# Patient Record
Sex: FEMALE | Race: WHITE | NOT HISPANIC OR LATINO | Employment: OTHER | URBAN - METROPOLITAN AREA
[De-identification: names, ages, dates, MRNs, and addresses within clinical notes are randomized per-mention and may not be internally consistent; named-entity substitution may affect disease eponyms.]

---

## 2017-01-01 ENCOUNTER — OFFICE VISIT (OUTPATIENT)
Dept: URGENT CARE | Facility: CLINIC | Age: 82
End: 2017-01-01
Payer: COMMERCIAL

## 2017-01-01 ENCOUNTER — TELEPHONE (OUTPATIENT)
Dept: URGENT CARE | Facility: CLINIC | Age: 82
End: 2017-01-01

## 2017-01-01 VITALS
RESPIRATION RATE: 18 BRPM | DIASTOLIC BLOOD PRESSURE: 92 MMHG | HEIGHT: 59 IN | BODY MASS INDEX: 34.68 KG/M2 | TEMPERATURE: 97 F | OXYGEN SATURATION: 97 % | WEIGHT: 172 LBS | SYSTOLIC BLOOD PRESSURE: 169 MMHG | HEART RATE: 71 BPM

## 2017-01-01 VITALS
RESPIRATION RATE: 18 BRPM | OXYGEN SATURATION: 97 % | BODY MASS INDEX: 34.68 KG/M2 | TEMPERATURE: 97 F | HEART RATE: 64 BPM | HEIGHT: 59 IN | SYSTOLIC BLOOD PRESSURE: 101 MMHG | DIASTOLIC BLOOD PRESSURE: 78 MMHG | WEIGHT: 172 LBS

## 2017-01-01 DIAGNOSIS — S91.052A CAT BITE OF LEFT ANKLE, INITIAL ENCOUNTER: Primary | ICD-10-CM

## 2017-01-01 DIAGNOSIS — L08.9 CAT BITE OF LEFT LOWER LEG WITH INFECTION, INITIAL ENCOUNTER: ICD-10-CM

## 2017-01-01 DIAGNOSIS — S81.852A CAT BITE OF LEFT LOWER LEG WITH INFECTION, INITIAL ENCOUNTER: ICD-10-CM
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
## 2017-01-01 DIAGNOSIS — S91.052A CAT BITE OF ANKLE, LEFT, INITIAL ENCOUNTER: Primary | ICD-10-CM

## 2017-01-01 DIAGNOSIS — N76.1 SUBACUTE VAGINITIS: ICD-10-CM
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
## 2017-01-01 DIAGNOSIS — Z28.39 IMMUNIZATION DEFICIENCY: ICD-10-CM
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
## 2017-01-01 PROCEDURE — 99203 OFFICE O/P NEW LOW 30 MIN: CPT | Mod: S$GLB,,, | Performed by: EMERGENCY MEDICINE

## 2017-01-01 PROCEDURE — 99214 OFFICE O/P EST MOD 30 MIN: CPT | Mod: S$GLB,,, | Performed by: PHYSICIAN ASSISTANT

## 2017-01-01 RX ORDER — AMOXICILLIN AND CLAVULANATE POTASSIUM 875; 125 MG/1; MG/1
1 TABLET, FILM COATED ORAL 2 TIMES DAILY
Qty: 20 TABLET | Refills: 0 | Status: SHIPPED | OUTPATIENT
Start: 2017-01-01 | End: 2017-01-01

## 2017-01-01 RX ORDER — NAPROXEN SODIUM 220 MG
220 TABLET ORAL 2 TIMES DAILY WITH MEALS
COMMUNITY
End: 2018-01-01

## 2017-01-01 RX ORDER — FLUCONAZOLE 150 MG/1
150 TABLET ORAL ONCE
Qty: 1 TABLET | Refills: 0 | Status: SHIPPED | OUTPATIENT
Start: 2017-01-01 | End: 2017-01-01

## 2017-01-01 RX ORDER — AMOXICILLIN AND CLAVULANATE POTASSIUM 500; 125 MG/1; MG/1
1 TABLET, FILM COATED ORAL 2 TIMES DAILY
Qty: 20 TABLET | Refills: 0 | Status: SHIPPED | OUTPATIENT
Start: 2017-01-01 | End: 2017-01-01

## 2017-01-01 RX ORDER — CLINDAMYCIN PHOSPHATE 150 MG/ML
300 INJECTION, SOLUTION INTRAVENOUS
Status: COMPLETED | OUTPATIENT
Start: 2017-01-01 | End: 2017-01-01

## 2017-01-01 RX ADMIN — CLINDAMYCIN PHOSPHATE 300 MG: 150 INJECTION, SOLUTION INTRAVENOUS at 12:10

## 2017-10-23 PROBLEM — Z28.39 IMMUNIZATION DEFICIENCY: Status: ACTIVE | Noted: 2017-01-01
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
## 2017-10-23 PROBLEM — S81.852A CAT BITE OF LEFT LOWER LEG WITH INFECTION: Status: ACTIVE | Noted: 2017-01-01
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
## 2017-10-23 PROBLEM — L08.9 CAT BITE OF LEFT LOWER LEG WITH INFECTION: Status: ACTIVE | Noted: 2017-01-01

## 2017-10-23 PROBLEM — S91.052A: Status: ACTIVE | Noted: 2017-01-01

## 2017-10-23 PROBLEM — N76.1 SUBACUTE VAGINITIS: Status: ACTIVE | Noted: 2017-01-01

## 2017-10-23 NOTE — PROGRESS NOTES
"Subjective:       Patient ID: Mitzy Perez is a 84 y.o. female.    Vitals:  height is 4' 11" (1.499 m) and weight is 78 kg (172 lb). Her oral temperature is 97.3 °F (36.3 °C). Her blood pressure is 169/92 (abnormal) and her pulse is 71. Her respiration is 18 and oxygen saturation is 97%.     Chief Complaint: Animal Bite    Last TT greater than 10 years, gets vaginitis with antibiotics.      Animal Bite    The incident occurred more than 2 days ago (4 days age). The incident occurred at home. There is an injury to the left lower leg. The pain is mild. There have been no prior injuries to these areas. Her tetanus status is unknown. She has been behaving normally.     Review of Systems   Skin: Positive for color change.        Patient stated she was bit by her cat. The bite parish is black and hot to the touch       Objective:      Physical Exam   Constitutional: She is oriented to person, place, and time. She appears well-developed and well-nourished.   HENT:   Head: Normocephalic and atraumatic.   Neck: Normal range of motion. Neck supple.   Cardiovascular: Normal rate, regular rhythm and intact distal pulses.    Murmur heard.  Pulmonary/Chest: Breath sounds normal.   Musculoskeletal: Normal range of motion.   Neurological: She is alert and oriented to person, place, and time.   Skin:   Lateral left ankle cat bite with surrounding erythema/warmth/tenderness, no drainage/fluctuance   Psychiatric: She has a normal mood and affect. Her behavior is normal.       Assessment:       1. Cat bite of ankle, left, initial encounter    2. Cat bite of left lower leg with infection, initial encounter    3. Immunization deficiency    4. Subacute vaginitis        Plan:         Cat bite of ankle, left, initial encounter  -     amoxicillin-clavulanate 500-125mg (AUGMENTIN) 500-125 mg Tab; Take 1 tablet (500 mg total) by mouth 2 (two) times daily.  Dispense: 20 tablet; Refill: 0    Cat bite of left lower leg with infection, initial " encounter  -     clindamycin injection 300 mg; Inject 2 mLs (300 mg total) into the muscle one time.  -     amoxicillin-clavulanate 500-125mg (AUGMENTIN) 500-125 mg Tab; Take 1 tablet (500 mg total) by mouth 2 (two) times daily.  Dispense: 20 tablet; Refill: 0    Immunization deficiency  -     diptheria-tetanus toxoids 2-2 Lf unit/0.5 mL injection 0.5 mL; Inject 0.5 mLs into the muscle one time.    Subacute vaginitis  -     fluconazole (DIFLUCAN) 150 MG Tab; Take 1 tablet (150 mg total) by mouth once. Take one time as needed for vaginal yeast infection  Dispense: 1 tablet; Refill: 0      Can Nuñez MD  Go to the Emergency Department for any problems

## 2017-10-23 NOTE — PATIENT INSTRUCTIONS
Cat Bite    A cat bite can cause a wound deep enough to break the skin. In such cases, the wound is cleaned and then sometimes closed. If the wound is closed it is usually not closed completely. This is so that fluid can drain if the wound becomes infected. Often the wound is left open to heal. In addition to wound care, a tetanus shot may be given, if needed.  Home care  · Wash your hands well with soap and warm water before and after caring for the wound. This helps lower the risk of infection.  · Care for the wound as directed. If a dressing was applied to the wound, be sure to change it as directed.  · If the wound bleeds, place a clean, soft cloth on the wound. Then firmly apply pressure until the bleeding stops. This may take up to 5 minutes. Do not release the pressure and look at the wound during this time.  · Always get medical attention for cat bites on the hand. They are highly likely to become infected.  · Most wounds heal within 10 days. But an infection can occur even with proper treatment. So be sure to check the wound daily for signs of infection (see below).  · Antibiotics may be prescribed. These help prevent or treat infection. If youre given antibiotics, take them as directed. Also be sure to complete the medicines.  Rabies prevention  Rabies is a virus that can be carried in certain animals. These can include domestic animals such as cats and dogs. Pets fully vaccinated against rabies (2 shots) are at very low risk of infection. But because human rabies is almost always fatal, any biting pet should be confined for 10 days as an extra precaution. In general, if there is a risk for rabies, the following steps may need to be taken:  · If someones pet cat has bitten you, it should be kept in a secure area for the next 10 days to watch for signs of illness. (If the pet owner wont allow this, contact your local animal control center.) If the cat becomes ill or dies during that time, contact your  local animal control center at once so the animal may be tested for rabies. If the cat stays healthy for the next 10 days, there is no danger of rabies in the animal or you.  · If a stray cat bit you, contact your local animal control center. They can give information on capture, quarantine, and animal rabies testing.  · If you cant find the animal that bit you in the next 2 days, and if rabies exists in your area, you may need to receive the rabies vaccine series. Call your healthcare provider right away. Or return to the emergency department promptly.  · All animal bites should be reported to the local animal control center. If you were not given a form to fill out, you can report this yourself.  Follow-up care  Follow up with your healthcare provider, or as directed.  When to seek medical advice  Call your healthcare provider right away if any of these occur:  · Signs of infection:  ¨ Spreading redness or warmth from the wound  ¨ Increased pain or swelling  ¨ Fever of 100.4ºF (38ºC) or higher, or as directed by your healthcare provider  ¨ Colored fluid or pus draining from the wound  ¨ Enlarged lymph nodes above the area that was bitten, such as lymph nodes in the armpit if you were bitten on the hand or arm. This may be a sign of cat-scratch disease (cat-scratch fever).  · Signs of rabies infection:  ¨ Headache  ¨ Confusion  ¨ Strange behavior  ¨ Increased salivating or drooling  ¨ Seizure  · Decreased ability to move any body part near the bite area  · Bleeding that can't be stopped after 5 minutes of firm pressure  Date Last Reviewed: 3/23/2015  © 1645-8094 [employer]. [address] [place] [address], [place] [address]. All rights reserved. This information is not intended as a substitute for professional medical care. Always follow your healthcare professional's instructions.      Can Nuñez MD  Go to the Emergency Department for any problems

## 2017-11-04 NOTE — PROGRESS NOTES
"Subjective:       Patient ID: Mitzy Perez is a 84 y.o. female.    Vitals:  height is 4' 11" (1.499 m) and weight is 78 kg (172 lb). Her temperature is 97 °F (36.1 °C). Her pulse is 64. Her respiration is 18 and oxygen saturation is 97%.     Chief Complaint: Cat Bite    This is a 84 y.o. female with Past Medical History:   Arthritis   Cataract   Macular degeneration   who presents today with a chief complaint of a infected cat bite that is causing her foot to swell.         Edema   This is a new problem. The current episode started yesterday. The problem occurs constantly. The problem has been unchanged. Associated symptoms include joint swelling. Pertinent negatives include no abdominal pain, chest pain, chills, fever, headaches, nausea, rash, sore throat or vomiting. The symptoms are aggravated by standing. She has tried position changes, relaxation and rest for the symptoms. The treatment provided mild relief.     Review of Systems   Constitution: Negative for chills and fever.   HENT: Negative for sore throat.    Eyes: Negative for blurred vision.   Cardiovascular: Negative for chest pain.   Respiratory: Negative for shortness of breath.    Skin: Negative for rash.   Musculoskeletal: Positive for joint swelling. Negative for back pain and joint pain.   Gastrointestinal: Negative for abdominal pain, diarrhea, nausea and vomiting.   Neurological: Negative for headaches.   Psychiatric/Behavioral: The patient is not nervous/anxious.        Objective:      Physical Exam   Constitutional: She is oriented to person, place, and time. She appears well-developed and well-nourished.   HENT:   Head: Normocephalic and atraumatic. Head is without abrasion, without contusion and without laceration.   Right Ear: External ear normal.   Left Ear: External ear normal.   Nose: Nose normal.   Mouth/Throat: Oropharynx is clear and moist.   Eyes: Conjunctivae, EOM and lids are normal. Pupils are equal, round, and reactive to light. "   Neck: Trachea normal, full passive range of motion without pain and phonation normal. Neck supple.   Cardiovascular: Normal rate, regular rhythm and normal heart sounds.    Pulmonary/Chest: Effort normal and breath sounds normal. No stridor. No respiratory distress.   Musculoskeletal: Normal range of motion.   Neurological: She is alert and oriented to person, place, and time.   Skin: Skin is warm, dry and intact. Capillary refill takes less than 2 seconds. Lesion noted. No abrasion, no bruising, no burn, no ecchymosis, no laceration and no rash noted. There is erythema.        Healing cat bite noted L lateral ankle with mild surrounding erythema and edema; NO drainage or oozing    FULL PAINLESS ROM L ANKLE  NON  TTP L CALF  ABLE TO AMBULATE    Psychiatric: She has a normal mood and affect. Her speech is normal and behavior is normal. Judgment and thought content normal. Cognition and memory are normal.   Nursing note and vitals reviewed.      Assessment:       1. Cat bite of left ankle, initial encounter    2. Cat bite of left lower leg with infection, initial encounter        Plan:         Cat bite of left ankle, initial encounter  -     amoxicillin-clavulanate 875-125mg (AUGMENTIN) 875-125 mg per tablet; Take 1 tablet by mouth 2 (two) times daily.  Dispense: 20 tablet; Refill: 0    Cat bite of left lower leg with infection, initial encounter  -     amoxicillin-clavulanate 875-125mg (AUGMENTIN) 875-125 mg per tablet; Take 1 tablet by mouth 2 (two) times daily.  Dispense: 20 tablet; Refill: 0          Cat Bite    A cat bite can cause a wound deep enough to break the skin. In such cases, the wound is cleaned and then sometimes closed. If the wound is closed it is usually not closed completely. This is so that fluid can drain if the wound becomes infected. Often the wound is left open to heal. In addition to wound care, a tetanus shot may be given, if needed.  Home care  · Wash your hands well with soap and warm  water before and after caring for the wound. This helps lower the risk of infection.  · Care for the wound as directed. If a dressing was applied to the wound, be sure to change it as directed.  · If the wound bleeds, place a clean, soft cloth on the wound. Then firmly apply pressure until the bleeding stops. This may take up to 5 minutes. Do not release the pressure and look at the wound during this time.  · Always get medical attention for cat bites on the hand. They are highly likely to become infected.  · Most wounds heal within 10 days. But an infection can occur even with proper treatment. So be sure to check the wound daily for signs of infection (see below).  · Antibiotics may be prescribed. These help prevent or treat infection. If youre given antibiotics, take them as directed. Also be sure to complete the medicines.  Rabies prevention  Rabies is a virus that can be carried in certain animals. These can include domestic animals such as cats and dogs. Pets fully vaccinated against rabies (2 shots) are at very low risk of infection. But because human rabies is almost always fatal, any biting pet should be confined for 10 days as an extra precaution. In general, if there is a risk for rabies, the following steps may need to be taken:  · If someones pet cat has bitten you, it should be kept in a secure area for the next 10 days to watch for signs of illness. (If the pet owner wont allow this, contact your local animal control center.) If the cat becomes ill or dies during that time, contact your local animal control center at once so the animal may be tested for rabies. If the cat stays healthy for the next 10 days, there is no danger of rabies in the animal or you.  · If a stray cat bit you, contact your local animal control center. They can give information on capture, quarantine, and animal rabies testing.  · If you cant find the animal that bit you in the next 2 days, and if rabies exists in your area,  you may need to receive the rabies vaccine series. Call your healthcare provider right away. Or return to the emergency department promptly.  · All animal bites should be reported to the local animal control center. If you were not given a form to fill out, you can report this yourself.  Follow-up care  Follow up with your healthcare provider, or as directed.  When to seek medical advice  Call your healthcare provider right away if any of these occur:  · Signs of infection:  ¨ Spreading redness or warmth from the wound  ¨ Increased pain or swelling  ¨ Fever of 100.4ºF (38ºC) or higher, or as directed by your healthcare provider  ¨ Colored fluid or pus draining from the wound  ¨ Enlarged lymph nodes above the area that was bitten, such as lymph nodes in the armpit if you were bitten on the hand or arm. This may be a sign of cat-scratch disease (cat-scratch fever).  · Signs of rabies infection:  ¨ Headache  ¨ Confusion  ¨ Strange behavior  ¨ Increased salivating or drooling  ¨ Seizure  · Decreased ability to move any body part near the bite area  · Bleeding that can't be stopped after 5 minutes of firm pressure  Date Last Reviewed: 3/23/2015  © 5772-0118 [employer]. [address] [place] [address]. All rights reserved. This information is not intended as a substitute for professional medical care. Always follow your healthcare professional's instructions.    Please follow up with your Primary care provider within 2-5 days if your signs and symptoms have not resolved or worsen.     If your condition worsens or fails to improve we recommend that you receive another evaluation at the emergency room immediately or contact your primary medical clinic to discuss your concerns.   You must understand that you have received an Urgent Care treatment only and that you may be released before all of your medical problems are known or treated. You, the patient, will arrange for follow up care as instructed.

## 2017-11-04 NOTE — PATIENT INSTRUCTIONS
Cat Bite    A cat bite can cause a wound deep enough to break the skin. In such cases, the wound is cleaned and then sometimes closed. If the wound is closed it is usually not closed completely. This is so that fluid can drain if the wound becomes infected. Often the wound is left open to heal. In addition to wound care, a tetanus shot may be given, if needed.  Home care  · Wash your hands well with soap and warm water before and after caring for the wound. This helps lower the risk of infection.  · Care for the wound as directed. If a dressing was applied to the wound, be sure to change it as directed.  · If the wound bleeds, place a clean, soft cloth on the wound. Then firmly apply pressure until the bleeding stops. This may take up to 5 minutes. Do not release the pressure and look at the wound during this time.  · Always get medical attention for cat bites on the hand. They are highly likely to become infected.  · Most wounds heal within 10 days. But an infection can occur even with proper treatment. So be sure to check the wound daily for signs of infection (see below).  · Antibiotics may be prescribed. These help prevent or treat infection. If youre given antibiotics, take them as directed. Also be sure to complete the medicines.  Rabies prevention  Rabies is a virus that can be carried in certain animals. These can include domestic animals such as cats and dogs. Pets fully vaccinated against rabies (2 shots) are at very low risk of infection. But because human rabies is almost always fatal, any biting pet should be confined for 10 days as an extra precaution. In general, if there is a risk for rabies, the following steps may need to be taken:  · If someones pet cat has bitten you, it should be kept in a secure area for the next 10 days to watch for signs of illness. (If the pet owner wont allow this, contact your local animal control center.) If the cat becomes ill or dies during that time, contact your  local animal control center at once so the animal may be tested for rabies. If the cat stays healthy for the next 10 days, there is no danger of rabies in the animal or you.  · If a stray cat bit you, contact your local animal control center. They can give information on capture, quarantine, and animal rabies testing.  · If you cant find the animal that bit you in the next 2 days, and if rabies exists in your area, you may need to receive the rabies vaccine series. Call your healthcare provider right away. Or return to the emergency department promptly.  · All animal bites should be reported to the local animal control center. If you were not given a form to fill out, you can report this yourself.  Follow-up care  Follow up with your healthcare provider, or as directed.  When to seek medical advice  Call your healthcare provider right away if any of these occur:  · Signs of infection:  ¨ Spreading redness or warmth from the wound  ¨ Increased pain or swelling  ¨ Fever of 100.4ºF (38ºC) or higher, or as directed by your healthcare provider  ¨ Colored fluid or pus draining from the wound  ¨ Enlarged lymph nodes above the area that was bitten, such as lymph nodes in the armpit if you were bitten on the hand or arm. This may be a sign of cat-scratch disease (cat-scratch fever).  · Signs of rabies infection:  ¨ Headache  ¨ Confusion  ¨ Strange behavior  ¨ Increased salivating or drooling  ¨ Seizure  · Decreased ability to move any body part near the bite area  · Bleeding that can't be stopped after 5 minutes of firm pressure  Date Last Reviewed: 3/23/2015  © 7699-4506 [employer]. [address] [place] [address], [place] [address]. All rights reserved. This information is not intended as a substitute for professional medical care. Always follow your healthcare professional's instructions.    Please follow up with your Primary care provider within 2-5 days if your signs and symptoms have not resolved or worsen.      If your condition worsens or fails to improve we recommend that you receive another evaluation at the emergency room immediately or contact your primary medical clinic to discuss your concerns.   You must understand that you have received an Urgent Care treatment only and that you may be released before all of your medical problems are known or treated. You, the patient, will arrange for follow up care as instructed.

## 2018-01-01 ENCOUNTER — HOSPITAL ENCOUNTER (OUTPATIENT)
Dept: RADIOLOGY | Facility: HOSPITAL | Age: 83
Discharge: HOME | End: 2018-09-17
Attending: PHYSICAL MEDICINE & REHABILITATION
Payer: MEDICARE

## 2018-01-01 ENCOUNTER — TELEPHONE (OUTPATIENT)
Dept: NEUROLOGY | Facility: CLINIC | Age: 83
End: 2018-01-01

## 2018-01-01 ENCOUNTER — TELEPHONE (OUTPATIENT)
Dept: INTERNAL MEDICINE | Facility: CLINIC | Age: 83
End: 2018-01-01

## 2018-01-01 ENCOUNTER — PATIENT MESSAGE (OUTPATIENT)
Dept: INTERNAL MEDICINE | Facility: CLINIC | Age: 83
End: 2018-01-01

## 2018-01-01 ENCOUNTER — HOSPITAL ENCOUNTER (OUTPATIENT)
Dept: RADIOLOGY | Facility: HOSPITAL | Age: 83
Discharge: HOME | End: 2018-09-14
Attending: PHYSICAL MEDICINE & REHABILITATION
Payer: MEDICARE

## 2018-01-01 ENCOUNTER — ANESTHESIA EVENT (OUTPATIENT)
Dept: SURGERY | Facility: HOSPITAL | Age: 83
DRG: 494 | End: 2018-01-01
Payer: MEDICARE

## 2018-01-01 ENCOUNTER — NURSE TRIAGE (OUTPATIENT)
Dept: ADMINISTRATIVE | Facility: CLINIC | Age: 83
End: 2018-01-01

## 2018-01-01 ENCOUNTER — CLINICAL SUPPORT (OUTPATIENT)
Dept: ELECTROPHYSIOLOGY | Facility: CLINIC | Age: 83
DRG: 494 | End: 2018-01-01
Attending: ORTHOPAEDIC SURGERY
Payer: MEDICARE

## 2018-01-01 ENCOUNTER — HOSPITAL ENCOUNTER (INPATIENT)
Facility: HOSPITAL | Age: 83
LOS: 2 days | Discharge: NURSING, REHAB OR LONG-TERM CARE | DRG: 494 | End: 2018-09-12
Attending: EMERGENCY MEDICINE | Admitting: HOSPITALIST
Payer: COMMERCIAL

## 2018-01-01 ENCOUNTER — HOSPITAL ENCOUNTER (INPATIENT)
Facility: HOSPITAL | Age: 83
LOS: 2 days | Discharge: HOME HEALTH | DRG: 065 | End: 2018-09-07
Attending: EMERGENCY MEDICINE | Admitting: PSYCHIATRY & NEUROLOGY
Payer: MEDICARE

## 2018-01-01 ENCOUNTER — ANESTHESIA (OUTPATIENT)
Dept: ENDOSCOPY | Facility: HOSPITAL | Age: 83
DRG: 377 | End: 2018-01-01
Payer: COMMERCIAL

## 2018-01-01 ENCOUNTER — ANESTHESIA (OUTPATIENT)
Dept: SURGERY | Facility: HOSPITAL | Age: 83
DRG: 494 | End: 2018-01-01
Payer: COMMERCIAL

## 2018-01-01 ENCOUNTER — ANESTHESIA EVENT (OUTPATIENT)
Dept: ENDOSCOPY | Facility: HOSPITAL | Age: 83
DRG: 377 | End: 2018-01-01
Payer: MEDICARE

## 2018-01-01 ENCOUNTER — HOSPITAL ENCOUNTER (INPATIENT)
Facility: HOSPITAL | Age: 83
LOS: 3 days | DRG: 377 | End: 2018-09-21
Attending: EMERGENCY MEDICINE | Admitting: HOSPITALIST
Payer: MEDICARE

## 2018-01-01 VITALS
HEIGHT: 57 IN | DIASTOLIC BLOOD PRESSURE: 54 MMHG | RESPIRATION RATE: 22 BRPM | TEMPERATURE: 99 F | HEART RATE: 67 BPM | BODY MASS INDEX: 37.19 KG/M2 | SYSTOLIC BLOOD PRESSURE: 136 MMHG | OXYGEN SATURATION: 95 % | WEIGHT: 172.38 LBS

## 2018-01-01 VITALS
RESPIRATION RATE: 12 BRPM | TEMPERATURE: 96 F | DIASTOLIC BLOOD PRESSURE: 35 MMHG | WEIGHT: 178 LBS | OXYGEN SATURATION: 91 % | SYSTOLIC BLOOD PRESSURE: 78 MMHG | HEIGHT: 57 IN | HEART RATE: 42 BPM | BODY MASS INDEX: 38.4 KG/M2

## 2018-01-01 VITALS
DIASTOLIC BLOOD PRESSURE: 53 MMHG | SYSTOLIC BLOOD PRESSURE: 113 MMHG | HEART RATE: 55 BPM | RESPIRATION RATE: 16 BRPM | TEMPERATURE: 97 F | HEIGHT: 57 IN | BODY MASS INDEX: 38.52 KG/M2 | WEIGHT: 178.56 LBS | OXYGEN SATURATION: 98 %

## 2018-01-01 DIAGNOSIS — I63.89 ACUTE ISCHEMIC MULTIFOCAL MULTIPLE VASCULAR TERRITORIES STROKE: ICD-10-CM
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
## 2018-01-01 DIAGNOSIS — E87.5 HYPERKALEMIA: ICD-10-CM

## 2018-01-01 DIAGNOSIS — I16.0 HYPERTENSIVE URGENCY: ICD-10-CM

## 2018-01-01 DIAGNOSIS — R52 PAIN: ICD-10-CM

## 2018-01-01 DIAGNOSIS — Z01.818 PRE-OP TESTING: ICD-10-CM

## 2018-01-01 DIAGNOSIS — K57.31 DIVERTICULOSIS OF LARGE INTESTINE WITH HEMORRHAGE: ICD-10-CM

## 2018-01-01 DIAGNOSIS — S82.852A CLOSED TRIMALLEOLAR FRACTURE OF LEFT ANKLE, INITIAL ENCOUNTER: Primary | ICD-10-CM

## 2018-01-01 DIAGNOSIS — R41.82 ALTERED MENTAL STATUS, UNSPECIFIED ALTERED MENTAL STATUS TYPE: ICD-10-CM

## 2018-01-01 DIAGNOSIS — K26.9 DUODENAL ULCER: ICD-10-CM

## 2018-01-01 DIAGNOSIS — Z51.5 PALLIATIVE CARE ENCOUNTER: ICD-10-CM

## 2018-01-01 DIAGNOSIS — Z86.73 H/O ISCHEMIC MULTIFOCAL MULTIPLE VASCULAR TERRITORIES STROKE: ICD-10-CM

## 2018-01-01 DIAGNOSIS — I10 ESSENTIAL HYPERTENSION: ICD-10-CM

## 2018-01-01 DIAGNOSIS — A41.9 SEPSIS, DUE TO UNSPECIFIED ORGANISM: ICD-10-CM

## 2018-01-01 DIAGNOSIS — I63.89 ACUTE ISCHEMIC MULTIFOCAL MULTIPLE VASCULAR TERRITORIES STROKE: Primary | ICD-10-CM

## 2018-01-01 DIAGNOSIS — E78.2 MIXED HYPERLIPIDEMIA: ICD-10-CM

## 2018-01-01 DIAGNOSIS — K92.2 UPPER GI BLEED: Primary | ICD-10-CM

## 2018-01-01 DIAGNOSIS — E66.01 OBESITIES, MORBID: ICD-10-CM

## 2018-01-01 DIAGNOSIS — K42.9 UMBILICAL HERNIA WITHOUT OBSTRUCTION AND WITHOUT GANGRENE: ICD-10-CM

## 2018-01-01 DIAGNOSIS — Z71.89 GOALS OF CARE, COUNSELING/DISCUSSION: ICD-10-CM

## 2018-01-01 DIAGNOSIS — K80.20 CALCULUS OF GALLBLADDER WITHOUT CHOLECYSTITIS WITHOUT OBSTRUCTION: ICD-10-CM

## 2018-01-01 DIAGNOSIS — D62 ACUTE BLOOD LOSS ANEMIA: ICD-10-CM

## 2018-01-01 DIAGNOSIS — K52.9 ENTEROCOLITIS: ICD-10-CM

## 2018-01-01 DIAGNOSIS — I70.0 ATHEROSCLEROSIS OF AORTA: ICD-10-CM

## 2018-01-01 DIAGNOSIS — G45.9 TRANSIENT CEREBRAL ISCHEMIA, UNSPECIFIED TYPE: ICD-10-CM

## 2018-01-01 DIAGNOSIS — R79.1 ELEVATED INR: ICD-10-CM
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
## 2018-01-01 DIAGNOSIS — K92.0 HEMATEMESIS WITHOUT NAUSEA: ICD-10-CM

## 2018-01-01 DIAGNOSIS — R10.13 EPIGASTRIC PAIN: ICD-10-CM

## 2018-01-01 DIAGNOSIS — N17.8 ACUTE RENAL FAILURE WITH OTHER SPECIFIED PATHOLOGICAL LESION IN KIDNEY: ICD-10-CM

## 2018-01-01 DIAGNOSIS — H35.30 ARMD (AGE RELATED MACULAR DEGENERATION): ICD-10-CM

## 2018-01-01 DIAGNOSIS — R79.89 ELEVATED TROPONIN: ICD-10-CM

## 2018-01-01 DIAGNOSIS — R41.0 CONFUSION: ICD-10-CM

## 2018-01-01 DIAGNOSIS — I25.10 ATHEROSCLEROSIS OF NATIVE CORONARY ARTERY OF NATIVE HEART WITHOUT ANGINA PECTORIS: ICD-10-CM

## 2018-01-01 DIAGNOSIS — S82.62XA CLOSED DISPLACED FRACTURE OF LATERAL MALLEOLUS OF LEFT FIBULA, INITIAL ENCOUNTER: ICD-10-CM

## 2018-01-01 DIAGNOSIS — I63.9 CEREBROVASCULAR ACCIDENT (CVA), UNSPECIFIED MECHANISM: ICD-10-CM

## 2018-01-01 DIAGNOSIS — S82.852A LEFT TRIMALLEOLAR FRACTURE, CLOSED, INITIAL ENCOUNTER: ICD-10-CM

## 2018-01-01 LAB
ABO + RH BLD: NORMAL
ALBUMIN SERPL BCP-MCNC: 1.8 G/DL
ALBUMIN SERPL BCP-MCNC: 2.1 G/DL
ALBUMIN SERPL BCP-MCNC: 2.1 G/DL
ALBUMIN SERPL BCP-MCNC: 2.3 G/DL
ALBUMIN SERPL BCP-MCNC: 2.3 G/DL
ALBUMIN SERPL BCP-MCNC: 2.5 G/DL
ALBUMIN SERPL BCP-MCNC: 2.5 G/DL
ALBUMIN SERPL BCP-MCNC: 2.6 G/DL
ALBUMIN SERPL BCP-MCNC: 2.7 G/DL
ALBUMIN SERPL BCP-MCNC: 3 G/DL
ALLENS TEST: ABNORMAL
ALLENS TEST: ABNORMAL
ALP SERPL-CCNC: 110 U/L
ALP SERPL-CCNC: 114 U/L
ALP SERPL-CCNC: 118 U/L
ALP SERPL-CCNC: 118 U/L
ALP SERPL-CCNC: 123 U/L
ALP SERPL-CCNC: 136 U/L
ALP SERPL-CCNC: 140 U/L
ALP SERPL-CCNC: 146 U/L
ALP SERPL-CCNC: 159 U/L
ALP SERPL-CCNC: 89 U/L
ALT SERPL W/O P-5'-P-CCNC: 125 U/L
ALT SERPL W/O P-5'-P-CCNC: 15 U/L
ALT SERPL W/O P-5'-P-CCNC: 19 U/L
ALT SERPL W/O P-5'-P-CCNC: 19 U/L
ALT SERPL W/O P-5'-P-CCNC: 20 U/L
ALT SERPL W/O P-5'-P-CCNC: 21 U/L
ALT SERPL W/O P-5'-P-CCNC: 21 U/L
ALT SERPL W/O P-5'-P-CCNC: 22 U/L
ALT SERPL W/O P-5'-P-CCNC: 22 U/L
ALT SERPL W/O P-5'-P-CCNC: 24 U/L
AMORPH CRY UR QL COMP ASSIST: ABNORMAL
ANION GAP SERPL CALC-SCNC: 10 MMOL/L
ANION GAP SERPL CALC-SCNC: 12 MMOL/L
ANION GAP SERPL CALC-SCNC: 5 MMOL/L
ANION GAP SERPL CALC-SCNC: 5 MMOL/L
ANION GAP SERPL CALC-SCNC: 7 MMOL/L
ANION GAP SERPL CALC-SCNC: 8 MMOL/L
ANION GAP SERPL CALC-SCNC: 8 MMOL/L
ANION GAP SERPL CALC-SCNC: 9 MMOL/L
ANION GAP SERPL CALC-SCNC: 9 MMOL/L
ANISOCYTOSIS BLD QL SMEAR: ABNORMAL
ANISOCYTOSIS BLD QL SMEAR: ABNORMAL
ANISOCYTOSIS BLD QL SMEAR: SLIGHT
AORTIC VALVE STENOSIS: ABNORMAL
APTT BLDCRRT: 22.4 SEC
APTT BLDCRRT: 24 SEC
APTT BLDCRRT: 33.6 SEC
APTT BLDCRRT: <21 SEC
AST SERPL-CCNC: 307 U/L
AST SERPL-CCNC: 42 U/L
AST SERPL-CCNC: 43 U/L
AST SERPL-CCNC: 44 U/L
AST SERPL-CCNC: 47 U/L
AST SERPL-CCNC: 49 U/L
AST SERPL-CCNC: 51 U/L
AST SERPL-CCNC: 53 U/L
AST SERPL-CCNC: 57 U/L
AST SERPL-CCNC: 58 U/L
BACTERIA #/AREA URNS AUTO: ABNORMAL /HPF
BACTERIA #/AREA URNS AUTO: ABNORMAL /HPF
BASOPHILS # BLD AUTO: 0.01 K/UL
BASOPHILS # BLD AUTO: 0.01 K/UL
BASOPHILS # BLD AUTO: 0.02 K/UL
BASOPHILS # BLD AUTO: 0.02 K/UL
BASOPHILS # BLD AUTO: 0.04 K/UL
BASOPHILS # BLD AUTO: 0.04 K/UL
BASOPHILS # BLD AUTO: 0.05 K/UL
BASOPHILS # BLD AUTO: 0.06 K/UL
BASOPHILS # BLD AUTO: 0.07 K/UL
BASOPHILS # BLD AUTO: 0.07 K/UL
BASOPHILS # BLD AUTO: 0.08 K/UL
BASOPHILS # BLD AUTO: 0.09 K/UL
BASOPHILS # BLD AUTO: 0.11 K/UL
BASOPHILS # BLD AUTO: 0.14 K/UL
BASOPHILS NFR BLD: 0.1 %
BASOPHILS NFR BLD: 0.1 %
BASOPHILS NFR BLD: 0.2 %
BASOPHILS NFR BLD: 0.3 %
BASOPHILS NFR BLD: 0.4 %
BASOPHILS NFR BLD: 0.4 %
BASOPHILS NFR BLD: 0.5 %
BASOPHILS NFR BLD: 0.7 %
BASOPHILS NFR BLD: 0.7 %
BASOPHILS NFR BLD: 0.9 %
BASOPHILS NFR BLD: 0.9 %
BILIRUB SERPL-MCNC: 0.3 MG/DL
BILIRUB SERPL-MCNC: 0.4 MG/DL
BILIRUB SERPL-MCNC: 0.4 MG/DL
BILIRUB SERPL-MCNC: 0.6 MG/DL
BILIRUB SERPL-MCNC: 0.7 MG/DL
BILIRUB SERPL-MCNC: 0.8 MG/DL
BILIRUB SERPL-MCNC: 0.9 MG/DL
BILIRUB SERPL-MCNC: 0.9 MG/DL
BILIRUB UR QL STRIP: NEGATIVE
BLD GP AB SCN CELLS X3 SERPL QL: NORMAL
BLD PROD TYP BPU: NORMAL
BLD PROD TYP BPU: NORMAL
BLOOD UNIT EXPIRATION DATE: NORMAL
BLOOD UNIT EXPIRATION DATE: NORMAL
BLOOD UNIT TYPE CODE: 5100
BLOOD UNIT TYPE CODE: 5100
BLOOD UNIT TYPE: NORMAL
BLOOD UNIT TYPE: NORMAL
BNP SERPL-MCNC: 482 PG/ML
BUN SERPL-MCNC: 22 MG/DL
BUN SERPL-MCNC: 23 MG/DL
BUN SERPL-MCNC: 23 MG/DL
BUN SERPL-MCNC: 27 MG/DL
BUN SERPL-MCNC: 36 MG/DL
BUN SERPL-MCNC: 45 MG/DL
BUN SERPL-MCNC: 53 MG/DL
BUN SERPL-MCNC: 57 MG/DL
BUN SERPL-MCNC: 63 MG/DL
BUN SERPL-MCNC: 71 MG/DL
BUN SERPL-MCNC: 74 MG/DL
BUN SERPL-MCNC: 83 MG/DL
BUN SERPL-MCNC: 84 MG/DL
BUN SERPL-MCNC: 85 MG/DL
CALCIUM SERPL-MCNC: 7.9 MG/DL
CALCIUM SERPL-MCNC: 8.2 MG/DL
CALCIUM SERPL-MCNC: 8.5 MG/DL
CALCIUM SERPL-MCNC: 8.5 MG/DL
CALCIUM SERPL-MCNC: 8.6 MG/DL
CALCIUM SERPL-MCNC: 8.6 MG/DL
CALCIUM SERPL-MCNC: 8.8 MG/DL
CALCIUM SERPL-MCNC: 8.8 MG/DL
CALCIUM SERPL-MCNC: 8.9 MG/DL
CALCIUM SERPL-MCNC: 8.9 MG/DL
CALCIUM SERPL-MCNC: 9 MG/DL
CALCIUM SERPL-MCNC: 9 MG/DL
CALCIUM SERPL-MCNC: 9.3 MG/DL
CALCIUM SERPL-MCNC: 9.5 MG/DL
CHLORIDE SERPL-SCNC: 105 MMOL/L
CHLORIDE SERPL-SCNC: 106 MMOL/L
CHLORIDE SERPL-SCNC: 106 MMOL/L
CHLORIDE SERPL-SCNC: 108 MMOL/L
CHLORIDE SERPL-SCNC: 109 MMOL/L
CHLORIDE SERPL-SCNC: 109 MMOL/L
CHLORIDE SERPL-SCNC: 110 MMOL/L
CHLORIDE SERPL-SCNC: 110 MMOL/L
CHLORIDE SERPL-SCNC: 114 MMOL/L
CHLORIDE SERPL-SCNC: 114 MMOL/L
CHLORIDE SERPL-SCNC: 115 MMOL/L
CHLORIDE SERPL-SCNC: 116 MMOL/L
CHLORIDE SERPL-SCNC: 117 MMOL/L
CHLORIDE SERPL-SCNC: 119 MMOL/L
CHOLEST SERPL-MCNC: 164 MG/DL
CHOLEST/HDLC SERPL: 4.4 {RATIO}
CK MB SERPL-MCNC: 4.6 NG/ML
CK MB SERPL-RTO: 3.2 %
CK SERPL-CCNC: 143 U/L
CLARITY UR REFRACT.AUTO: ABNORMAL
CLARITY UR REFRACT.AUTO: ABNORMAL
CLARITY UR REFRACT.AUTO: CLEAR
CO2 SERPL-SCNC: 10 MMOL/L
CO2 SERPL-SCNC: 17 MMOL/L
CO2 SERPL-SCNC: 18 MMOL/L
CO2 SERPL-SCNC: 19 MMOL/L
CO2 SERPL-SCNC: 19 MMOL/L
CO2 SERPL-SCNC: 20 MMOL/L
CO2 SERPL-SCNC: 21 MMOL/L
CO2 SERPL-SCNC: 21 MMOL/L
CO2 SERPL-SCNC: 22 MMOL/L
CO2 SERPL-SCNC: 24 MMOL/L
CO2 SERPL-SCNC: 25 MMOL/L
CO2 SERPL-SCNC: 25 MMOL/L
CODING SYSTEM: NORMAL
CODING SYSTEM: NORMAL
COLOR UR AUTO: ABNORMAL
COLOR UR AUTO: YELLOW
COLOR UR AUTO: YELLOW
CREAT SERPL-MCNC: 1.1 MG/DL
CREAT SERPL-MCNC: 1.2 MG/DL
CREAT SERPL-MCNC: 1.3 MG/DL
CREAT SERPL-MCNC: 1.4 MG/DL
CREAT SERPL-MCNC: 1.4 MG/DL
CREAT SERPL-MCNC: 1.5 MG/DL
CREAT SERPL-MCNC: 1.5 MG/DL
CREAT SERPL-MCNC: 1.6 MG/DL
CREAT SERPL-MCNC: 2 MG/DL
CREAT SERPL-MCNC: 2.6 MG/DL
CREAT SERPL-MCNC: 3 MG/DL
CREAT SERPL-MCNC: 3.3 MG/DL
CREAT UR-MCNC: 100 MG/DL
DACRYOCYTES BLD QL SMEAR: ABNORMAL
DELSYS: ABNORMAL
DIFFERENTIAL METHOD: ABNORMAL
DISPENSE STATUS: NORMAL
DISPENSE STATUS: NORMAL
EOSINOPHIL # BLD AUTO: 0 K/UL
EOSINOPHIL # BLD AUTO: 0.1 K/UL
EOSINOPHIL # BLD AUTO: 0.2 K/UL
EOSINOPHIL # BLD AUTO: 0.3 K/UL
EOSINOPHIL # BLD AUTO: 0.4 K/UL
EOSINOPHIL # BLD AUTO: 0.5 K/UL
EOSINOPHIL # BLD AUTO: 0.5 K/UL
EOSINOPHIL NFR BLD: 0 %
EOSINOPHIL NFR BLD: 0.1 %
EOSINOPHIL NFR BLD: 0.1 %
EOSINOPHIL NFR BLD: 0.2 %
EOSINOPHIL NFR BLD: 2 %
EOSINOPHIL NFR BLD: 2.2 %
EOSINOPHIL NFR BLD: 2.2 %
EOSINOPHIL NFR BLD: 3 %
EOSINOPHIL NFR BLD: 3 %
EOSINOPHIL NFR BLD: 3.4 %
EOSINOPHIL NFR BLD: 5.7 %
EOSINOPHIL NFR BLD: 6 %
EOSINOPHIL NFR BLD: 6.7 %
ERYTHROCYTE [DISTWIDTH] IN BLOOD BY AUTOMATED COUNT: 15.1 %
ERYTHROCYTE [DISTWIDTH] IN BLOOD BY AUTOMATED COUNT: 15.5 %
ERYTHROCYTE [DISTWIDTH] IN BLOOD BY AUTOMATED COUNT: 16 %
ERYTHROCYTE [DISTWIDTH] IN BLOOD BY AUTOMATED COUNT: 16.1 %
ERYTHROCYTE [DISTWIDTH] IN BLOOD BY AUTOMATED COUNT: 16.2 %
ERYTHROCYTE [DISTWIDTH] IN BLOOD BY AUTOMATED COUNT: 16.6 %
ERYTHROCYTE [DISTWIDTH] IN BLOOD BY AUTOMATED COUNT: 16.7 %
ERYTHROCYTE [DISTWIDTH] IN BLOOD BY AUTOMATED COUNT: 16.8 %
ERYTHROCYTE [DISTWIDTH] IN BLOOD BY AUTOMATED COUNT: 16.8 %
ERYTHROCYTE [DISTWIDTH] IN BLOOD BY AUTOMATED COUNT: 16.9 %
ERYTHROCYTE [DISTWIDTH] IN BLOOD BY AUTOMATED COUNT: 17.2 %
ERYTHROCYTE [DISTWIDTH] IN BLOOD BY AUTOMATED COUNT: 17.3 %
ERYTHROCYTE [SEDIMENTATION RATE] IN BLOOD BY WESTERGREN METHOD: 30 MM/H
EST. GFR  (AFRICAN AMERICAN): 14 ML/MIN/1.73 M^2
EST. GFR  (AFRICAN AMERICAN): 15.7 ML/MIN/1.73 M^2
EST. GFR  (AFRICAN AMERICAN): 18.7 ML/MIN/1.73 M^2
EST. GFR  (AFRICAN AMERICAN): 25.7 ML/MIN/1.73 M^2
EST. GFR  (AFRICAN AMERICAN): 33.6 ML/MIN/1.73 M^2
EST. GFR  (AFRICAN AMERICAN): 36.4 ML/MIN/1.73 M^2
EST. GFR  (AFRICAN AMERICAN): 36.4 ML/MIN/1.73 M^2
EST. GFR  (AFRICAN AMERICAN): 39.5 ML/MIN/1.73 M^2
EST. GFR  (AFRICAN AMERICAN): 39.5 ML/MIN/1.73 M^2
EST. GFR  (AFRICAN AMERICAN): 43.2 ML/MIN/1.73 M^2
EST. GFR  (AFRICAN AMERICAN): 47.6 ML/MIN/1.73 M^2
EST. GFR  (AFRICAN AMERICAN): 52.9 ML/MIN/1.73 M^2
EST. GFR  (NON AFRICAN AMERICAN): 12.2 ML/MIN/1.73 M^2
EST. GFR  (NON AFRICAN AMERICAN): 13.6 ML/MIN/1.73 M^2
EST. GFR  (NON AFRICAN AMERICAN): 16.2 ML/MIN/1.73 M^2
EST. GFR  (NON AFRICAN AMERICAN): 22.3 ML/MIN/1.73 M^2
EST. GFR  (NON AFRICAN AMERICAN): 29.2 ML/MIN/1.73 M^2
EST. GFR  (NON AFRICAN AMERICAN): 31.5 ML/MIN/1.73 M^2
EST. GFR  (NON AFRICAN AMERICAN): 31.5 ML/MIN/1.73 M^2
EST. GFR  (NON AFRICAN AMERICAN): 34.3 ML/MIN/1.73 M^2
EST. GFR  (NON AFRICAN AMERICAN): 34.3 ML/MIN/1.73 M^2
EST. GFR  (NON AFRICAN AMERICAN): 37.5 ML/MIN/1.73 M^2
EST. GFR  (NON AFRICAN AMERICAN): 41.3 ML/MIN/1.73 M^2
EST. GFR  (NON AFRICAN AMERICAN): 45.9 ML/MIN/1.73 M^2
ESTIMATED AVG GLUCOSE: 97 MG/DL
FIO2: 31
FLOW: 4
GIANT PLATELETS BLD QL SMEAR: PRESENT
GLUCOSE SERPL-MCNC: 104 MG/DL
GLUCOSE SERPL-MCNC: 107 MG/DL
GLUCOSE SERPL-MCNC: 109 MG/DL
GLUCOSE SERPL-MCNC: 40 MG/DL
GLUCOSE SERPL-MCNC: 72 MG/DL
GLUCOSE SERPL-MCNC: 75 MG/DL
GLUCOSE SERPL-MCNC: 78 MG/DL
GLUCOSE SERPL-MCNC: 81 MG/DL
GLUCOSE SERPL-MCNC: 87 MG/DL
GLUCOSE SERPL-MCNC: 89 MG/DL
GLUCOSE SERPL-MCNC: 90 MG/DL
GLUCOSE SERPL-MCNC: 93 MG/DL
GLUCOSE SERPL-MCNC: 94 MG/DL
GLUCOSE SERPL-MCNC: 94 MG/DL
GLUCOSE UR QL STRIP: NEGATIVE
HBA1C MFR BLD HPLC: 5 %
HCO3 UR-SCNC: 17.3 MMOL/L (ref 24–28)
HCT VFR BLD AUTO: 18.6 %
HCT VFR BLD AUTO: 27.4 %
HCT VFR BLD AUTO: 30.7 %
HCT VFR BLD AUTO: 31 %
HCT VFR BLD AUTO: 31.1 %
HCT VFR BLD AUTO: 32.8 %
HCT VFR BLD AUTO: 32.8 %
HCT VFR BLD AUTO: 32.9 %
HCT VFR BLD AUTO: 33.5 %
HCT VFR BLD AUTO: 33.5 %
HCT VFR BLD AUTO: 33.6 %
HCT VFR BLD AUTO: 34.8 %
HCT VFR BLD AUTO: 35.4 %
HCT VFR BLD AUTO: 36.8 %
HCT VFR BLD AUTO: 37 %
HCT VFR BLD AUTO: 40.9 %
HDLC SERPL-MCNC: 37 MG/DL
HDLC SERPL: 22.6 %
HGB BLD-MCNC: 10.4 G/DL
HGB BLD-MCNC: 10.7 G/DL
HGB BLD-MCNC: 10.8 G/DL
HGB BLD-MCNC: 10.9 G/DL
HGB BLD-MCNC: 11.2 G/DL
HGB BLD-MCNC: 11.6 G/DL
HGB BLD-MCNC: 11.7 G/DL
HGB BLD-MCNC: 12 G/DL
HGB BLD-MCNC: 13.4 G/DL
HGB BLD-MCNC: 5.7 G/DL
HGB BLD-MCNC: 8.5 G/DL
HGB BLD-MCNC: 9.9 G/DL
HGB BLD-MCNC: 9.9 G/DL
HGB UR QL STRIP: ABNORMAL
HGB UR QL STRIP: NEGATIVE
HGB UR QL STRIP: NEGATIVE
HYALINE CASTS UR QL AUTO: 0 /LPF
HYALINE CASTS UR QL AUTO: 10 /LPF
HYPOCHROMIA BLD QL SMEAR: ABNORMAL
IMM GRANULOCYTES # BLD AUTO: 0.01 K/UL
IMM GRANULOCYTES # BLD AUTO: 0.02 K/UL
IMM GRANULOCYTES # BLD AUTO: 0.02 K/UL
IMM GRANULOCYTES # BLD AUTO: 0.03 K/UL
IMM GRANULOCYTES # BLD AUTO: 0.03 K/UL
IMM GRANULOCYTES # BLD AUTO: 0.04 K/UL
IMM GRANULOCYTES # BLD AUTO: 0.04 K/UL
IMM GRANULOCYTES # BLD AUTO: 0.05 K/UL
IMM GRANULOCYTES # BLD AUTO: 0.1 K/UL
IMM GRANULOCYTES # BLD AUTO: 0.12 K/UL
IMM GRANULOCYTES # BLD AUTO: 0.28 K/UL
IMM GRANULOCYTES # BLD AUTO: 0.33 K/UL
IMM GRANULOCYTES # BLD AUTO: 0.39 K/UL
IMM GRANULOCYTES # BLD AUTO: 0.39 K/UL
IMM GRANULOCYTES # BLD AUTO: 0.46 K/UL
IMM GRANULOCYTES # BLD AUTO: 0.47 K/UL
IMM GRANULOCYTES NFR BLD AUTO: 0.1 %
IMM GRANULOCYTES NFR BLD AUTO: 0.3 %
IMM GRANULOCYTES NFR BLD AUTO: 0.4 %
IMM GRANULOCYTES NFR BLD AUTO: 0.5 %
IMM GRANULOCYTES NFR BLD AUTO: 0.8 %
IMM GRANULOCYTES NFR BLD AUTO: 1 %
IMM GRANULOCYTES NFR BLD AUTO: 1 %
IMM GRANULOCYTES NFR BLD AUTO: 1.4 %
IMM GRANULOCYTES NFR BLD AUTO: 1.4 %
IMM GRANULOCYTES NFR BLD AUTO: 1.5 %
IMM GRANULOCYTES NFR BLD AUTO: 1.6 %
IMM GRANULOCYTES NFR BLD AUTO: 1.6 %
INR PPP: 1.1
INR PPP: 3.6
KETONES UR QL STRIP: NEGATIVE
LACTATE SERPL-SCNC: 1.3 MMOL/L
LACTATE SERPL-SCNC: 2.3 MMOL/L
LACTATE SERPL-SCNC: 2.9 MMOL/L
LDH SERPL L TO P-CCNC: 2.29 MMOL/L (ref 0.36–1.25)
LDLC SERPL CALC-MCNC: 100.6 MG/DL
LEUKOCYTE ESTERASE UR QL STRIP: ABNORMAL
LEUKOCYTE ESTERASE UR QL STRIP: ABNORMAL
LEUKOCYTE ESTERASE UR QL STRIP: NEGATIVE
LIPASE SERPL-CCNC: 60 U/L
LYMPHOCYTES # BLD AUTO: 0.8 K/UL
LYMPHOCYTES # BLD AUTO: 0.9 K/UL
LYMPHOCYTES # BLD AUTO: 1 K/UL
LYMPHOCYTES # BLD AUTO: 1 K/UL
LYMPHOCYTES # BLD AUTO: 1.1 K/UL
LYMPHOCYTES # BLD AUTO: 1.2 K/UL
LYMPHOCYTES # BLD AUTO: 1.4 K/UL
LYMPHOCYTES # BLD AUTO: 1.5 K/UL
LYMPHOCYTES # BLD AUTO: 1.5 K/UL
LYMPHOCYTES # BLD AUTO: 1.6 K/UL
LYMPHOCYTES # BLD AUTO: 1.7 K/UL
LYMPHOCYTES NFR BLD: 11.5 %
LYMPHOCYTES NFR BLD: 11.5 %
LYMPHOCYTES NFR BLD: 15.9 %
LYMPHOCYTES NFR BLD: 16.1 %
LYMPHOCYTES NFR BLD: 17.6 %
LYMPHOCYTES NFR BLD: 18.8 %
LYMPHOCYTES NFR BLD: 21.8 %
LYMPHOCYTES NFR BLD: 3.5 %
LYMPHOCYTES NFR BLD: 4 %
LYMPHOCYTES NFR BLD: 4.2 %
LYMPHOCYTES NFR BLD: 4.6 %
LYMPHOCYTES NFR BLD: 4.8 %
LYMPHOCYTES NFR BLD: 7.5 %
LYMPHOCYTES NFR BLD: 8.6 %
LYMPHOCYTES NFR BLD: 9.4 %
LYMPHOCYTES NFR BLD: 9.9 %
MAGNESIUM SERPL-MCNC: 1.9 MG/DL
MAGNESIUM SERPL-MCNC: 1.9 MG/DL
MAGNESIUM SERPL-MCNC: 2 MG/DL
MAGNESIUM SERPL-MCNC: 2.2 MG/DL
MCH RBC QN AUTO: 29.3 PG
MCH RBC QN AUTO: 29.6 PG
MCH RBC QN AUTO: 29.6 PG
MCH RBC QN AUTO: 29.8 PG
MCH RBC QN AUTO: 29.9 PG
MCH RBC QN AUTO: 30 PG
MCH RBC QN AUTO: 30.1 PG
MCH RBC QN AUTO: 30.2 PG
MCH RBC QN AUTO: 30.3 PG
MCH RBC QN AUTO: 30.3 PG
MCH RBC QN AUTO: 30.4 PG
MCH RBC QN AUTO: 30.4 PG
MCH RBC QN AUTO: 30.8 PG
MCH RBC QN AUTO: 30.8 PG
MCH RBC QN AUTO: 30.9 PG
MCH RBC QN AUTO: 31.1 PG
MCHC RBC AUTO-ENTMCNC: 28.9 G/DL
MCHC RBC AUTO-ENTMCNC: 30.6 G/DL
MCHC RBC AUTO-ENTMCNC: 31 G/DL
MCHC RBC AUTO-ENTMCNC: 31.8 G/DL
MCHC RBC AUTO-ENTMCNC: 31.9 G/DL
MCHC RBC AUTO-ENTMCNC: 31.9 G/DL
MCHC RBC AUTO-ENTMCNC: 32.2 G/DL
MCHC RBC AUTO-ENTMCNC: 32.5 G/DL
MCHC RBC AUTO-ENTMCNC: 32.6 G/DL
MCHC RBC AUTO-ENTMCNC: 32.6 G/DL
MCHC RBC AUTO-ENTMCNC: 32.8 G/DL
MCHC RBC AUTO-ENTMCNC: 32.8 G/DL
MCHC RBC AUTO-ENTMCNC: 33.3 G/DL
MCHC RBC AUTO-ENTMCNC: 33.3 G/DL
MCHC RBC AUTO-ENTMCNC: 33.4 G/DL
MCHC RBC AUTO-ENTMCNC: 33.9 G/DL
MCV RBC AUTO: 107 FL
MCV RBC AUTO: 91 FL
MCV RBC AUTO: 92 FL
MCV RBC AUTO: 92 FL
MCV RBC AUTO: 93 FL
MCV RBC AUTO: 94 FL
MCV RBC AUTO: 95 FL
MCV RBC AUTO: 96 FL
MCV RBC AUTO: 96 FL
MCV RBC AUTO: 98 FL
MICROSCOPIC COMMENT: ABNORMAL
MICROSCOPIC COMMENT: ABNORMAL
MITRAL VALVE REGURGITATION: ABNORMAL
MODE: ABNORMAL
MONOCYTES # BLD AUTO: 0.8 K/UL
MONOCYTES # BLD AUTO: 0.9 K/UL
MONOCYTES # BLD AUTO: 1.1 K/UL
MONOCYTES # BLD AUTO: 1.2 K/UL
MONOCYTES # BLD AUTO: 1.3 K/UL
MONOCYTES # BLD AUTO: 1.3 K/UL
MONOCYTES # BLD AUTO: 1.4 K/UL
MONOCYTES # BLD AUTO: 1.4 K/UL
MONOCYTES # BLD AUTO: 1.7 K/UL
MONOCYTES # BLD AUTO: 2.3 K/UL
MONOCYTES # BLD AUTO: 2.5 K/UL
MONOCYTES # BLD AUTO: 2.5 K/UL
MONOCYTES # BLD AUTO: 2.7 K/UL
MONOCYTES # BLD AUTO: 3.2 K/UL
MONOCYTES NFR BLD: 11 %
MONOCYTES NFR BLD: 11.7 %
MONOCYTES NFR BLD: 12.9 %
MONOCYTES NFR BLD: 14.8 %
MONOCYTES NFR BLD: 14.9 %
MONOCYTES NFR BLD: 15.2 %
MONOCYTES NFR BLD: 17.7 %
MONOCYTES NFR BLD: 18 %
MONOCYTES NFR BLD: 6.9 %
MONOCYTES NFR BLD: 8.2 %
MONOCYTES NFR BLD: 8.4 %
MONOCYTES NFR BLD: 8.5 %
MONOCYTES NFR BLD: 8.8 %
MONOCYTES NFR BLD: 8.9 %
MONOCYTES NFR BLD: 9.7 %
MONOCYTES NFR BLD: 9.9 %
NEUTROPHILS # BLD AUTO: 16.5 K/UL
NEUTROPHILS # BLD AUTO: 22.2 K/UL
NEUTROPHILS # BLD AUTO: 22.7 K/UL
NEUTROPHILS # BLD AUTO: 24 K/UL
NEUTROPHILS # BLD AUTO: 25.5 K/UL
NEUTROPHILS # BLD AUTO: 27.3 K/UL
NEUTROPHILS # BLD AUTO: 4 K/UL
NEUTROPHILS # BLD AUTO: 4.4 K/UL
NEUTROPHILS # BLD AUTO: 4.4 K/UL
NEUTROPHILS # BLD AUTO: 4.9 K/UL
NEUTROPHILS # BLD AUTO: 6 K/UL
NEUTROPHILS # BLD AUTO: 7.8 K/UL
NEUTROPHILS # BLD AUTO: 7.8 K/UL
NEUTROPHILS # BLD AUTO: 7.9 K/UL
NEUTROPHILS # BLD AUTO: 8.9 K/UL
NEUTROPHILS # BLD AUTO: 9 K/UL
NEUTROPHILS NFR BLD: 56.7 %
NEUTROPHILS NFR BLD: 57.4 %
NEUTROPHILS NFR BLD: 58.1 %
NEUTROPHILS NFR BLD: 63.5 %
NEUTROPHILS NFR BLD: 64.5 %
NEUTROPHILS NFR BLD: 72.6 %
NEUTROPHILS NFR BLD: 73.4 %
NEUTROPHILS NFR BLD: 76.5 %
NEUTROPHILS NFR BLD: 81.4 %
NEUTROPHILS NFR BLD: 81.8 %
NEUTROPHILS NFR BLD: 81.9 %
NEUTROPHILS NFR BLD: 82.8 %
NEUTROPHILS NFR BLD: 83.6 %
NEUTROPHILS NFR BLD: 85.1 %
NEUTROPHILS NFR BLD: 85.2 %
NEUTROPHILS NFR BLD: 85.7 %
NITRITE UR QL STRIP: NEGATIVE
NONHDLC SERPL-MCNC: 127 MG/DL
NRBC BLD-RTO: 0 /100 WBC
OVALOCYTES BLD QL SMEAR: ABNORMAL
OVALOCYTES BLD QL SMEAR: ABNORMAL
PCO2 BLDA: 53.9 MMHG (ref 35–45)
PH SMN: 7.12 [PH] (ref 7.35–7.45)
PH UR STRIP: 5 [PH] (ref 5–8)
PH UR STRIP: 6 [PH] (ref 5–8)
PH UR STRIP: 6 [PH] (ref 5–8)
PHOSPHATE SERPL-MCNC: 2.6 MG/DL
PHOSPHATE SERPL-MCNC: 3.3 MG/DL
PHOSPHATE SERPL-MCNC: 3.5 MG/DL
PHOSPHATE SERPL-MCNC: 7.4 MG/DL
PLATELET # BLD AUTO: 160 K/UL
PLATELET # BLD AUTO: 162 K/UL
PLATELET # BLD AUTO: 162 K/UL
PLATELET # BLD AUTO: 174 K/UL
PLATELET # BLD AUTO: 175 K/UL
PLATELET # BLD AUTO: 179 K/UL
PLATELET # BLD AUTO: 181 K/UL
PLATELET # BLD AUTO: 187 K/UL
PLATELET # BLD AUTO: 194 K/UL
PLATELET # BLD AUTO: 199 K/UL
PLATELET # BLD AUTO: 199 K/UL
PLATELET # BLD AUTO: 204 K/UL
PLATELET # BLD AUTO: 205 K/UL
PLATELET # BLD AUTO: 218 K/UL
PLATELET # BLD AUTO: 228 K/UL
PLATELET # BLD AUTO: 308 K/UL
PLATELET BLD QL SMEAR: ABNORMAL
PMV BLD AUTO: 11.3 FL
PMV BLD AUTO: 11.5 FL
PMV BLD AUTO: 11.6 FL
PMV BLD AUTO: 11.6 FL
PMV BLD AUTO: 11.7 FL
PMV BLD AUTO: 11.8 FL
PMV BLD AUTO: 11.8 FL
PMV BLD AUTO: 11.9 FL
PMV BLD AUTO: 12 FL
PMV BLD AUTO: 12.1 FL
PMV BLD AUTO: 12.2 FL
PMV BLD AUTO: 12.2 FL
PMV BLD AUTO: 12.3 FL
PMV BLD AUTO: 12.5 FL
PO2 BLDA: 71 MMHG (ref 80–100)
POC BE: -12 MMOL/L
POC SATURATED O2: 87 % (ref 95–100)
POC TCO2: 19 MMOL/L (ref 23–27)
POCT GLUCOSE: 120 MG/DL (ref 70–110)
POIKILOCYTOSIS BLD QL SMEAR: SLIGHT
POIKILOCYTOSIS BLD QL SMEAR: SLIGHT
POLYCHROMASIA BLD QL SMEAR: ABNORMAL
POTASSIUM SERPL-SCNC: 4 MMOL/L
POTASSIUM SERPL-SCNC: 4.2 MMOL/L
POTASSIUM SERPL-SCNC: 4.3 MMOL/L
POTASSIUM SERPL-SCNC: 4.3 MMOL/L
POTASSIUM SERPL-SCNC: 4.4 MMOL/L
POTASSIUM SERPL-SCNC: 4.8 MMOL/L
POTASSIUM SERPL-SCNC: 4.8 MMOL/L
POTASSIUM SERPL-SCNC: 5.1 MMOL/L
POTASSIUM SERPL-SCNC: 5.6 MMOL/L
POTASSIUM SERPL-SCNC: 5.8 MMOL/L
POTASSIUM SERPL-SCNC: 5.8 MMOL/L
POTASSIUM SERPL-SCNC: 6.9 MMOL/L
PROT SERPL-MCNC: 5.5 G/DL
PROT SERPL-MCNC: 6.1 G/DL
PROT SERPL-MCNC: 6.2 G/DL
PROT SERPL-MCNC: 6.2 G/DL
PROT SERPL-MCNC: 6.8 G/DL
PROT SERPL-MCNC: 6.9 G/DL
PROT SERPL-MCNC: 7 G/DL
PROT SERPL-MCNC: 7.1 G/DL
PROT SERPL-MCNC: 7.1 G/DL
PROT SERPL-MCNC: 8 G/DL
PROT UR QL STRIP: ABNORMAL
PROT UR QL STRIP: ABNORMAL
PROT UR QL STRIP: NEGATIVE
PROTHROMBIN TIME: 11.2 SEC
PROTHROMBIN TIME: 11.5 SEC
PROTHROMBIN TIME: 11.7 SEC
PROTHROMBIN TIME: 34.9 SEC
PROVIDER CREDENTIALS: ABNORMAL
PROVIDER NOTIFIED: ABNORMAL
RBC # BLD AUTO: 1.9 M/UL
RBC # BLD AUTO: 2.87 M/UL
RBC # BLD AUTO: 3.2 M/UL
RBC # BLD AUTO: 3.26 M/UL
RBC # BLD AUTO: 3.34 M/UL
RBC # BLD AUTO: 3.47 M/UL
RBC # BLD AUTO: 3.55 M/UL
RBC # BLD AUTO: 3.58 M/UL
RBC # BLD AUTO: 3.59 M/UL
RBC # BLD AUTO: 3.61 M/UL
RBC # BLD AUTO: 3.62 M/UL
RBC # BLD AUTO: 3.7 M/UL
RBC # BLD AUTO: 3.83 M/UL
RBC # BLD AUTO: 3.9 M/UL
RBC # BLD AUTO: 3.99 M/UL
RBC # BLD AUTO: 4.44 M/UL
RBC #/AREA URNS AUTO: 5 /HPF (ref 0–4)
RBC #/AREA URNS AUTO: >100 /HPF (ref 0–4)
RETIRED EF AND QEF - SEE NOTES: 73 (ref 55–65)
SAMPLE: ABNORMAL
SAMPLE: ABNORMAL
SITE: ABNORMAL
SITE: ABNORMAL
SODIUM SERPL-SCNC: 136 MMOL/L
SODIUM SERPL-SCNC: 139 MMOL/L
SODIUM SERPL-SCNC: 140 MMOL/L
SODIUM SERPL-SCNC: 140 MMOL/L
SODIUM SERPL-SCNC: 141 MMOL/L
SODIUM SERPL-SCNC: 142 MMOL/L
SODIUM SERPL-SCNC: 143 MMOL/L
SODIUM SERPL-SCNC: 143 MMOL/L
SODIUM UR-SCNC: <20 MMOL/L
SP GR UR STRIP: 1.02 (ref 1–1.03)
SP GR UR STRIP: >1.03 (ref 1–1.03)
SP GR UR STRIP: >1.03 (ref 1–1.03)
SP02: 93
SQUAMOUS #/AREA URNS AUTO: 17 /HPF
SQUAMOUS #/AREA URNS AUTO: 7 /HPF
T4 FREE SERPL-MCNC: 0.7 NG/DL
TRANS ERYTHROCYTES VOL PATIENT: NORMAL ML
TRANS ERYTHROCYTES VOL PATIENT: NORMAL ML
TRIGL SERPL-MCNC: 132 MG/DL
TROPONIN I SERPL DL<=0.01 NG/ML-MCNC: 0.89 NG/ML
TROPONIN I SERPL DL<=0.01 NG/ML-MCNC: 0.9 NG/ML
TROPONIN I SERPL DL<=0.01 NG/ML-MCNC: 1.36 NG/ML
TSH SERPL DL<=0.005 MIU/L-ACNC: 4.29 UIU/ML
URN SPEC COLLECT METH UR: ABNORMAL
UROBILINOGEN UR STRIP-ACNC: 4 EU/DL
UROBILINOGEN UR STRIP-ACNC: ABNORMAL EU/DL
UROBILINOGEN UR STRIP-ACNC: NEGATIVE EU/DL
VERBAL RESULT READBACK PERFORMED: YES
WBC # BLD AUTO: 10.23 K/UL
WBC # BLD AUTO: 12.15 K/UL
WBC # BLD AUTO: 12.36 K/UL
WBC # BLD AUTO: 20.22 K/UL
WBC # BLD AUTO: 26.7 K/UL
WBC # BLD AUTO: 26.87 K/UL
WBC # BLD AUTO: 28.19 K/UL
WBC # BLD AUTO: 29.7 K/UL
WBC # BLD AUTO: 32.67 K/UL
WBC # BLD AUTO: 6.89 K/UL
WBC # BLD AUTO: 7.49 K/UL
WBC # BLD AUTO: 7.56 K/UL
WBC # BLD AUTO: 7.69 K/UL
WBC # BLD AUTO: 9.41 K/UL
WBC # BLD AUTO: 9.54 K/UL
WBC # BLD AUTO: 9.68 K/UL
WBC #/AREA URNS AUTO: 7 /HPF (ref 0–5)
WBC #/AREA URNS AUTO: 89 /HPF (ref 0–5)
WBC CLUMPS UR QL AUTO: ABNORMAL
WBC TOXIC VACUOLES BLD QL SMEAR: PRESENT
WBC TOXIC VACUOLES BLD QL SMEAR: PRESENT
YEAST UR QL AUTO: ABNORMAL

## 2018-01-01 PROCEDURE — 63600175 PHARM REV CODE 636 W HCPCS: Performed by: STUDENT IN AN ORGANIZED HEALTH CARE EDUCATION/TRAINING PROGRAM

## 2018-01-01 PROCEDURE — 25000003 PHARM REV CODE 250: Performed by: EMERGENCY MEDICINE

## 2018-01-01 PROCEDURE — 27201423 OPTIME MED/SURG SUP & DEVICES STERILE SUPPLY: Performed by: ORTHOPAEDIC SURGERY

## 2018-01-01 PROCEDURE — 27200750 HC INSULATED NEEDLE/ STIMUPLEX: Performed by: STUDENT IN AN ORGANIZED HEALTH CARE EDUCATION/TRAINING PROGRAM

## 2018-01-01 PROCEDURE — 99285 EMERGENCY DEPT VISIT HI MDM: CPT | Mod: ,,, | Performed by: PHYSICIAN ASSISTANT

## 2018-01-01 PROCEDURE — G8988 SELF CARE GOAL STATUS: HCPCS | Mod: CK

## 2018-01-01 PROCEDURE — G8997 SWALLOW GOAL STATUS: HCPCS | Mod: CH

## 2018-01-01 PROCEDURE — 43270 EGD LESION ABLATION: CPT | Mod: ,,, | Performed by: INTERNAL MEDICINE

## 2018-01-01 PROCEDURE — 27000221 HC OXYGEN, UP TO 24 HOURS

## 2018-01-01 PROCEDURE — 36000708 HC OR TIME LEV III 1ST 15 MIN: Performed by: ORTHOPAEDIC SURGERY

## 2018-01-01 PROCEDURE — 64447 NJX AA&/STRD FEMORAL NRV IMG: CPT | Performed by: ANESTHESIOLOGY

## 2018-01-01 PROCEDURE — 36415 COLL VENOUS BLD VENIPUNCTURE: CPT

## 2018-01-01 PROCEDURE — 84484 ASSAY OF TROPONIN QUANT: CPT

## 2018-01-01 PROCEDURE — 94640 AIRWAY INHALATION TREATMENT: CPT

## 2018-01-01 PROCEDURE — 93005 ELECTROCARDIOGRAM TRACING: CPT

## 2018-01-01 PROCEDURE — 81003 URINALYSIS AUTO W/O SCOPE: CPT

## 2018-01-01 PROCEDURE — 99219 PR INITIAL OBSERVATION CARE,LEVL II: CPT | Mod: ,,, | Performed by: PHYSICIAN ASSISTANT

## 2018-01-01 PROCEDURE — 96366 THER/PROPH/DIAG IV INF ADDON: CPT

## 2018-01-01 PROCEDURE — 80053 COMPREHEN METABOLIC PANEL: CPT

## 2018-01-01 PROCEDURE — 83735 ASSAY OF MAGNESIUM: CPT

## 2018-01-01 PROCEDURE — 99233 SBSQ HOSP IP/OBS HIGH 50: CPT | Mod: ,,, | Performed by: INTERNAL MEDICINE

## 2018-01-01 PROCEDURE — 25000003 PHARM REV CODE 250: Performed by: NURSE PRACTITIONER

## 2018-01-01 PROCEDURE — 25000003 PHARM REV CODE 250: Performed by: STUDENT IN AN ORGANIZED HEALTH CARE EDUCATION/TRAINING PROGRAM

## 2018-01-01 PROCEDURE — 83605 ASSAY OF LACTIC ACID: CPT

## 2018-01-01 PROCEDURE — 37000009 HC ANESTHESIA EA ADD 15 MINS: Performed by: ORTHOPAEDIC SURGERY

## 2018-01-01 PROCEDURE — G0378 HOSPITAL OBSERVATION PER HR: HCPCS

## 2018-01-01 PROCEDURE — 11000001 HC ACUTE MED/SURG PRIVATE ROOM

## 2018-01-01 PROCEDURE — C9113 INJ PANTOPRAZOLE SODIUM, VIA: HCPCS | Performed by: INTERNAL MEDICINE

## 2018-01-01 PROCEDURE — 99233 SBSQ HOSP IP/OBS HIGH 50: CPT | Mod: ,,, | Performed by: PSYCHIATRY & NEUROLOGY

## 2018-01-01 PROCEDURE — 25000003 PHARM REV CODE 250: Performed by: PHYSICIAN ASSISTANT

## 2018-01-01 PROCEDURE — 63600175 PHARM REV CODE 636 W HCPCS: Performed by: HOSPITALIST

## 2018-01-01 PROCEDURE — 83036 HEMOGLOBIN GLYCOSYLATED A1C: CPT

## 2018-01-01 PROCEDURE — 63600175 PHARM REV CODE 636 W HCPCS: Performed by: PHYSICIAN ASSISTANT

## 2018-01-01 PROCEDURE — 82553 CREATINE MB FRACTION: CPT

## 2018-01-01 PROCEDURE — 99223 1ST HOSP IP/OBS HIGH 75: CPT | Mod: ,,, | Performed by: PHYSICAL MEDICINE & REHABILITATION

## 2018-01-01 PROCEDURE — 84443 ASSAY THYROID STIM HORMONE: CPT

## 2018-01-01 PROCEDURE — G8978 MOBILITY CURRENT STATUS: HCPCS | Mod: CL

## 2018-01-01 PROCEDURE — 85610 PROTHROMBIN TIME: CPT

## 2018-01-01 PROCEDURE — 63600175 PHARM REV CODE 636 W HCPCS: Performed by: EMERGENCY MEDICINE

## 2018-01-01 PROCEDURE — 36430 TRANSFUSION BLD/BLD COMPNT: CPT

## 2018-01-01 PROCEDURE — 85730 THROMBOPLASTIN TIME PARTIAL: CPT

## 2018-01-01 PROCEDURE — 85025 COMPLETE CBC W/AUTO DIFF WBC: CPT

## 2018-01-01 PROCEDURE — 99233 SBSQ HOSP IP/OBS HIGH 50: CPT | Mod: ,,, | Performed by: HOSPITALIST

## 2018-01-01 PROCEDURE — 27200997: Performed by: INTERNAL MEDICINE

## 2018-01-01 PROCEDURE — A4216 STERILE WATER/SALINE, 10 ML: HCPCS | Performed by: NURSE PRACTITIONER

## 2018-01-01 PROCEDURE — 97168 OT RE-EVAL EST PLAN CARE: CPT

## 2018-01-01 PROCEDURE — G8987 SELF CARE CURRENT STATUS: HCPCS | Mod: CL

## 2018-01-01 PROCEDURE — 27822 TREATMENT OF ANKLE FRACTURE: CPT | Mod: LT,,, | Performed by: ORTHOPAEDIC SURGERY

## 2018-01-01 PROCEDURE — S0030 INJECTION, METRONIDAZOLE: HCPCS | Performed by: INTERNAL MEDICINE

## 2018-01-01 PROCEDURE — 92526 ORAL FUNCTION THERAPY: CPT

## 2018-01-01 PROCEDURE — 83880 ASSAY OF NATRIURETIC PEPTIDE: CPT

## 2018-01-01 PROCEDURE — 93010 ELECTROCARDIOGRAM REPORT: CPT | Mod: ,,, | Performed by: INTERNAL MEDICINE

## 2018-01-01 PROCEDURE — 92523 SPEECH SOUND LANG COMPREHEN: CPT

## 2018-01-01 PROCEDURE — 27201012 HC FORCEPS, HOT/COLD, DISP: Performed by: INTERNAL MEDICINE

## 2018-01-01 PROCEDURE — 99222 1ST HOSP IP/OBS MODERATE 55: CPT | Mod: ,,, | Performed by: INTERNAL MEDICINE

## 2018-01-01 PROCEDURE — 99285 EMERGENCY DEPT VISIT HI MDM: CPT | Mod: 25

## 2018-01-01 PROCEDURE — G8996 SWALLOW CURRENT STATUS: HCPCS | Mod: CH

## 2018-01-01 PROCEDURE — 25500020 PHARM REV CODE 255: Performed by: STUDENT IN AN ORGANIZED HEALTH CARE EDUCATION/TRAINING PROGRAM

## 2018-01-01 PROCEDURE — D9220A PRA ANESTHESIA: Mod: ANES,,, | Performed by: ANESTHESIOLOGY

## 2018-01-01 PROCEDURE — A4216 STERILE WATER/SALINE, 10 ML: HCPCS | Performed by: PHYSICIAN ASSISTANT

## 2018-01-01 PROCEDURE — G8979 MOBILITY GOAL STATUS: HCPCS | Mod: CK

## 2018-01-01 PROCEDURE — 25000242 PHARM REV CODE 250 ALT 637 W/ HCPCS: Performed by: PHYSICIAN ASSISTANT

## 2018-01-01 PROCEDURE — 63600175 PHARM REV CODE 636 W HCPCS: Performed by: INTERNAL MEDICINE

## 2018-01-01 PROCEDURE — D9220A PRA ANESTHESIA: Mod: CRNA,,, | Performed by: NURSE ANESTHETIST, CERTIFIED REGISTERED

## 2018-01-01 PROCEDURE — 37000008 HC ANESTHESIA 1ST 15 MINUTES: Performed by: ORTHOPAEDIC SURGERY

## 2018-01-01 PROCEDURE — 71045 X-RAY EXAM CHEST 1 VIEW: CPT | Mod: 26,,, | Performed by: RADIOLOGY

## 2018-01-01 PROCEDURE — 25500020 PHARM REV CODE 255: Performed by: EMERGENCY MEDICINE

## 2018-01-01 PROCEDURE — 25000003 PHARM REV CODE 250: Performed by: INTERNAL MEDICINE

## 2018-01-01 PROCEDURE — 97116 GAIT TRAINING THERAPY: CPT

## 2018-01-01 PROCEDURE — 97162 PT EVAL MOD COMPLEX 30 MIN: CPT

## 2018-01-01 PROCEDURE — 97161 PT EVAL LOW COMPLEX 20 MIN: CPT

## 2018-01-01 PROCEDURE — 74018 RADEX ABDOMEN 1 VIEW: CPT | Mod: 26,,, | Performed by: RADIOLOGY

## 2018-01-01 PROCEDURE — 87086 URINE CULTURE/COLONY COUNT: CPT

## 2018-01-01 PROCEDURE — 99900035 HC TECH TIME PER 15 MIN (STAT)

## 2018-01-01 PROCEDURE — 92610 EVALUATE SWALLOWING FUNCTION: CPT

## 2018-01-01 PROCEDURE — 82550 ASSAY OF CK (CPK): CPT

## 2018-01-01 PROCEDURE — 0W3P8ZZ CONTROL BLEEDING IN GASTROINTESTINAL TRACT, VIA NATURAL OR ARTIFICIAL OPENING ENDOSCOPIC: ICD-10-PCS | Performed by: INTERNAL MEDICINE

## 2018-01-01 PROCEDURE — 99225 PR SUBSEQUENT OBSERVATION CARE,LEVEL II: CPT | Mod: ,,, | Performed by: PHYSICIAN ASSISTANT

## 2018-01-01 PROCEDURE — 63600175 PHARM REV CODE 636 W HCPCS: Performed by: NURSE ANESTHETIST, CERTIFIED REGISTERED

## 2018-01-01 PROCEDURE — 84100 ASSAY OF PHOSPHORUS: CPT

## 2018-01-01 PROCEDURE — 83690 ASSAY OF LIPASE: CPT

## 2018-01-01 PROCEDURE — 20600001 HC STEP DOWN PRIVATE ROOM

## 2018-01-01 PROCEDURE — 92507 TX SP LANG VOICE COMM INDIV: CPT

## 2018-01-01 PROCEDURE — 99222 1ST HOSP IP/OBS MODERATE 55: CPT | Mod: ,,, | Performed by: NURSE PRACTITIONER

## 2018-01-01 PROCEDURE — 83605 ASSAY OF LACTIC ACID: CPT | Mod: 91

## 2018-01-01 PROCEDURE — C1713 ANCHOR/SCREW BN/BN,TIS/BN: HCPCS | Performed by: ORTHOPAEDIC SURGERY

## 2018-01-01 PROCEDURE — 94799 UNLISTED PULMONARY SVC/PX: CPT

## 2018-01-01 PROCEDURE — 99223 1ST HOSP IP/OBS HIGH 75: CPT | Mod: ,,, | Performed by: HOSPITALIST

## 2018-01-01 PROCEDURE — 27201038 HC PROBE, BI-POLAR: Performed by: INTERNAL MEDICINE

## 2018-01-01 PROCEDURE — 87088 URINE BACTERIA CULTURE: CPT

## 2018-01-01 PROCEDURE — 63600175 PHARM REV CODE 636 W HCPCS

## 2018-01-01 PROCEDURE — 99284 EMERGENCY DEPT VISIT MOD MDM: CPT | Mod: ,,, | Performed by: EMERGENCY MEDICINE

## 2018-01-01 PROCEDURE — 84300 ASSAY OF URINE SODIUM: CPT

## 2018-01-01 PROCEDURE — 97110 THERAPEUTIC EXERCISES: CPT

## 2018-01-01 PROCEDURE — 85025 COMPLETE CBC W/AUTO DIFF WBC: CPT | Mod: 91

## 2018-01-01 PROCEDURE — 64450 NJX AA&/STRD OTHER PN/BRANCH: CPT | Mod: 59,LT,, | Performed by: ANESTHESIOLOGY

## 2018-01-01 PROCEDURE — 36600 WITHDRAWAL OF ARTERIAL BLOOD: CPT

## 2018-01-01 PROCEDURE — 99233 SBSQ HOSP IP/OBS HIGH 50: CPT | Mod: ,,, | Performed by: PHYSICIAN ASSISTANT

## 2018-01-01 PROCEDURE — 25000242 PHARM REV CODE 250 ALT 637 W/ HCPCS: Performed by: STUDENT IN AN ORGANIZED HEALTH CARE EDUCATION/TRAINING PROGRAM

## 2018-01-01 PROCEDURE — 96365 THER/PROPH/DIAG IV INF INIT: CPT

## 2018-01-01 PROCEDURE — 43270 EGD LESION ABLATION: CPT | Performed by: INTERNAL MEDICINE

## 2018-01-01 PROCEDURE — 94761 N-INVAS EAR/PLS OXIMETRY MLT: CPT

## 2018-01-01 PROCEDURE — 25000003 PHARM REV CODE 250: Performed by: NURSE ANESTHETIST, CERTIFIED REGISTERED

## 2018-01-01 PROCEDURE — 87106 FUNGI IDENTIFICATION YEAST: CPT

## 2018-01-01 PROCEDURE — 84439 ASSAY OF FREE THYROXINE: CPT

## 2018-01-01 PROCEDURE — 97530 THERAPEUTIC ACTIVITIES: CPT

## 2018-01-01 PROCEDURE — 99239 HOSP IP/OBS DSCHRG MGMT >30: CPT | Mod: ,,, | Performed by: PHYSICIAN ASSISTANT

## 2018-01-01 PROCEDURE — 37000008 HC ANESTHESIA 1ST 15 MINUTES: Performed by: INTERNAL MEDICINE

## 2018-01-01 PROCEDURE — 71000039 HC RECOVERY, EACH ADD'L HOUR: Performed by: ORTHOPAEDIC SURGERY

## 2018-01-01 PROCEDURE — 0QSJ04Z REPOSITION RIGHT FIBULA WITH INTERNAL FIXATION DEVICE, OPEN APPROACH: ICD-10-PCS | Performed by: ORTHOPAEDIC SURGERY

## 2018-01-01 PROCEDURE — C9113 INJ PANTOPRAZOLE SODIUM, VIA: HCPCS | Performed by: EMERGENCY MEDICINE

## 2018-01-01 PROCEDURE — 27829 TREAT LOWER LEG JOINT: CPT | Mod: 51,LT,, | Performed by: ORTHOPAEDIC SURGERY

## 2018-01-01 PROCEDURE — 99222 1ST HOSP IP/OBS MODERATE 55: CPT | Mod: ,,, | Performed by: CLINICAL NURSE SPECIALIST

## 2018-01-01 PROCEDURE — 86920 COMPATIBILITY TEST SPIN: CPT

## 2018-01-01 PROCEDURE — 82803 BLOOD GASES ANY COMBINATION: CPT

## 2018-01-01 PROCEDURE — 86850 RBC ANTIBODY SCREEN: CPT

## 2018-01-01 PROCEDURE — P9021 RED BLOOD CELLS UNIT: HCPCS

## 2018-01-01 PROCEDURE — 93306 TTE W/DOPPLER COMPLETE: CPT | Mod: 26,,, | Performed by: INTERNAL MEDICINE

## 2018-01-01 PROCEDURE — 63600175 PHARM REV CODE 636 W HCPCS: Performed by: SURGERY

## 2018-01-01 PROCEDURE — 81001 URINALYSIS AUTO W/SCOPE: CPT

## 2018-01-01 PROCEDURE — 96374 THER/PROPH/DIAG INJ IV PUSH: CPT

## 2018-01-01 PROCEDURE — 99232 SBSQ HOSP IP/OBS MODERATE 35: CPT | Mod: ,,, | Performed by: NURSE PRACTITIONER

## 2018-01-01 PROCEDURE — 93306 TTE W/DOPPLER COMPLETE: CPT

## 2018-01-01 PROCEDURE — 99232 SBSQ HOSP IP/OBS MODERATE 35: CPT | Mod: 25,,, | Performed by: INTERNAL MEDICINE

## 2018-01-01 PROCEDURE — 25000003 PHARM REV CODE 250: Performed by: HOSPITALIST

## 2018-01-01 PROCEDURE — 97164 PT RE-EVAL EST PLAN CARE: CPT

## 2018-01-01 PROCEDURE — S0030 INJECTION, METRONIDAZOLE: HCPCS | Performed by: EMERGENCY MEDICINE

## 2018-01-01 PROCEDURE — 25000003 PHARM REV CODE 250

## 2018-01-01 PROCEDURE — S0030 INJECTION, METRONIDAZOLE: HCPCS | Performed by: STUDENT IN AN ORGANIZED HEALTH CARE EDUCATION/TRAINING PROGRAM

## 2018-01-01 PROCEDURE — 97165 OT EVAL LOW COMPLEX 30 MIN: CPT

## 2018-01-01 PROCEDURE — 37000009 HC ANESTHESIA EA ADD 15 MINS: Performed by: INTERNAL MEDICINE

## 2018-01-01 PROCEDURE — 99232 SBSQ HOSP IP/OBS MODERATE 35: CPT | Mod: ,,, | Performed by: PHYSICIAN ASSISTANT

## 2018-01-01 PROCEDURE — 95816 EEG AWAKE AND DROWSY: CPT | Mod: 26,,, | Performed by: PSYCHIATRY & NEUROLOGY

## 2018-01-01 PROCEDURE — 96361 HYDRATE IV INFUSION ADD-ON: CPT

## 2018-01-01 PROCEDURE — 97166 OT EVAL MOD COMPLEX 45 MIN: CPT

## 2018-01-01 PROCEDURE — 36000709 HC OR TIME LEV III EA ADD 15 MIN: Performed by: ORTHOPAEDIC SURGERY

## 2018-01-01 PROCEDURE — 76942 ECHO GUIDE FOR BIOPSY: CPT | Performed by: ANESTHESIOLOGY

## 2018-01-01 PROCEDURE — 80048 BASIC METABOLIC PNL TOTAL CA: CPT

## 2018-01-01 PROCEDURE — 99223 1ST HOSP IP/OBS HIGH 75: CPT | Mod: ,,, | Performed by: INTERNAL MEDICINE

## 2018-01-01 PROCEDURE — G8998 SWALLOW D/C STATUS: HCPCS | Mod: CH

## 2018-01-01 PROCEDURE — 99223 1ST HOSP IP/OBS HIGH 75: CPT | Mod: ,,, | Performed by: PSYCHIATRY & NEUROLOGY

## 2018-01-01 PROCEDURE — 25000003 PHARM REV CODE 250: Performed by: ORTHOPAEDIC SURGERY

## 2018-01-01 PROCEDURE — 96375 TX/PRO/DX INJ NEW DRUG ADDON: CPT | Mod: 59

## 2018-01-01 PROCEDURE — 80061 LIPID PANEL: CPT

## 2018-01-01 PROCEDURE — 99220 PR INITIAL OBSERVATION CARE,LEVL III: CPT | Mod: ,,, | Performed by: ORTHOPAEDIC SURGERY

## 2018-01-01 PROCEDURE — 82570 ASSAY OF URINE CREATININE: CPT

## 2018-01-01 PROCEDURE — 99223 1ST HOSP IP/OBS HIGH 75: CPT | Mod: ,,, | Performed by: SURGERY

## 2018-01-01 PROCEDURE — 80048 BASIC METABOLIC PNL TOTAL CA: CPT | Mod: 91

## 2018-01-01 PROCEDURE — C1769 GUIDE WIRE: HCPCS | Performed by: ORTHOPAEDIC SURGERY

## 2018-01-01 PROCEDURE — 84484 ASSAY OF TROPONIN QUANT: CPT | Mod: 91

## 2018-01-01 PROCEDURE — 99233 SBSQ HOSP IP/OBS HIGH 50: CPT | Mod: 57,,, | Performed by: ORTHOPAEDIC SURGERY

## 2018-01-01 PROCEDURE — 87040 BLOOD CULTURE FOR BACTERIA: CPT | Mod: 59

## 2018-01-01 PROCEDURE — 87040 BLOOD CULTURE FOR BACTERIA: CPT

## 2018-01-01 PROCEDURE — 64445 NJX AA&/STRD SCIATIC NRV IMG: CPT | Mod: 59,LT,, | Performed by: ANESTHESIOLOGY

## 2018-01-01 PROCEDURE — 71000033 HC RECOVERY, INTIAL HOUR: Performed by: ORTHOPAEDIC SURGERY

## 2018-01-01 DEVICE — SCREW CRTX LOW PROFILE H 36MM: Type: IMPLANTABLE DEVICE | Site: ANKLE | Status: FUNCTIONAL

## 2018-01-01 DEVICE — PLATE BONE 6H 3.5X2.7X112MM LF: Type: IMPLANTABLE DEVICE | Site: ANKLE | Status: FUNCTIONAL

## 2018-01-01 DEVICE — SCREW STRDRV REC T8 2.7X10 SS: Type: IMPLANTABLE DEVICE | Site: ANKLE | Status: FUNCTIONAL

## 2018-01-01 DEVICE — SCREW 2.7X14MM: Type: IMPLANTABLE DEVICE | Site: ANKLE | Status: FUNCTIONAL

## 2018-01-01 DEVICE — SCREW CORTEX 3.5MM X 14MM.: Type: IMPLANTABLE DEVICE | Site: ANKLE | Status: FUNCTIONAL

## 2018-01-01 DEVICE — SCREW CRTX LOW PROFILE H 40MM: Type: IMPLANTABLE DEVICE | Site: ANKLE | Status: FUNCTIONAL

## 2018-01-01 DEVICE — SCREW STRDRV REC T8 2.7X12 SS: Type: IMPLANTABLE DEVICE | Site: ANKLE | Status: FUNCTIONAL

## 2018-01-01 RX ORDER — ATORVASTATIN CALCIUM 20 MG/1
40 TABLET, FILM COATED ORAL DAILY
Status: DISCONTINUED | OUTPATIENT
Start: 2018-01-01 | End: 2018-01-01 | Stop reason: HOSPADM

## 2018-01-01 RX ORDER — MORPHINE SULFATE 10 MG/ML
2 INJECTION INTRAMUSCULAR; INTRAVENOUS; SUBCUTANEOUS EVERY 4 HOURS PRN
Status: DISCONTINUED | OUTPATIENT
Start: 2018-01-01 | End: 2018-01-01

## 2018-01-01 RX ORDER — IBUPROFEN 200 MG
24 TABLET ORAL
Status: DISCONTINUED | OUTPATIENT
Start: 2018-01-01 | End: 2018-01-01 | Stop reason: HOSPADM

## 2018-01-01 RX ORDER — BUPIVACAINE HYDROCHLORIDE AND EPINEPHRINE 5; 5 MG/ML; UG/ML
INJECTION, SOLUTION EPIDURAL; INTRACAUDAL; PERINEURAL
Status: COMPLETED | OUTPATIENT
Start: 2018-01-01 | End: 2018-01-01

## 2018-01-01 RX ORDER — LABETALOL HYDROCHLORIDE 5 MG/ML
10 INJECTION, SOLUTION INTRAVENOUS
Status: DISCONTINUED | OUTPATIENT
Start: 2018-01-01 | End: 2018-01-01 | Stop reason: HOSPADM

## 2018-01-01 RX ORDER — HYDROMORPHONE HYDROCHLORIDE 1 MG/ML
0.5 INJECTION, SOLUTION INTRAMUSCULAR; INTRAVENOUS; SUBCUTANEOUS ONCE
Status: COMPLETED | OUTPATIENT
Start: 2018-01-01 | End: 2018-01-01

## 2018-01-01 RX ORDER — SCOLOPAMINE TRANSDERMAL SYSTEM 1 MG/1
1 PATCH, EXTENDED RELEASE TRANSDERMAL
Status: DISCONTINUED | OUTPATIENT
Start: 2018-01-01 | End: 2018-01-01 | Stop reason: HOSPADM

## 2018-01-01 RX ORDER — LIDOCAINE HYDROCHLORIDE 10 MG/ML
20 INJECTION INFILTRATION; PERINEURAL ONCE
Status: COMPLETED | OUTPATIENT
Start: 2018-01-01 | End: 2018-01-01

## 2018-01-01 RX ORDER — NALOXONE HYDROCHLORIDE 1 MG/ML
1 INJECTION INTRAMUSCULAR; INTRAVENOUS; SUBCUTANEOUS ONCE
Status: COMPLETED | OUTPATIENT
Start: 2018-01-01 | End: 2018-01-01

## 2018-01-01 RX ORDER — SODIUM CHLORIDE 0.9 % (FLUSH) 0.9 %
5 SYRINGE (ML) INJECTION
Status: DISCONTINUED | OUTPATIENT
Start: 2018-01-01 | End: 2018-01-01 | Stop reason: HOSPADM

## 2018-01-01 RX ORDER — AMLODIPINE BESYLATE 5 MG/1
5 TABLET ORAL DAILY
Status: DISCONTINUED | OUTPATIENT
Start: 2018-01-01 | End: 2018-01-01 | Stop reason: HOSPADM

## 2018-01-01 RX ORDER — LISINOPRIL 20 MG/1
20 TABLET ORAL DAILY
Status: DISCONTINUED | OUTPATIENT
Start: 2018-01-01 | End: 2018-01-01 | Stop reason: HOSPADM

## 2018-01-01 RX ORDER — IPRATROPIUM BROMIDE AND ALBUTEROL SULFATE 2.5; .5 MG/3ML; MG/3ML
3 SOLUTION RESPIRATORY (INHALATION) EVERY 4 HOURS PRN
Status: DISCONTINUED | OUTPATIENT
Start: 2018-01-01 | End: 2018-01-01 | Stop reason: HOSPADM

## 2018-01-01 RX ORDER — LISINOPRIL 20 MG/1
20 TABLET ORAL DAILY
Qty: 90 TABLET | Refills: 3 | Status: ON HOLD | OUTPATIENT
Start: 2018-01-01 | End: 2018-01-01

## 2018-01-01 RX ORDER — FENTANYL CITRATE 50 UG/ML
25 INJECTION, SOLUTION INTRAMUSCULAR; INTRAVENOUS EVERY 5 MIN PRN
Status: DISCONTINUED | OUTPATIENT
Start: 2018-01-01 | End: 2018-01-01 | Stop reason: HOSPADM

## 2018-01-01 RX ORDER — MORPHINE SULFATE 10 MG/ML
1 INJECTION INTRAMUSCULAR; INTRAVENOUS; SUBCUTANEOUS EVERY 30 MIN PRN
Status: DISCONTINUED | OUTPATIENT
Start: 2018-01-01 | End: 2018-01-01

## 2018-01-01 RX ORDER — IBUPROFEN 200 MG
16 TABLET ORAL
Status: DISCONTINUED | OUTPATIENT
Start: 2018-01-01 | End: 2018-01-01 | Stop reason: HOSPADM

## 2018-01-01 RX ORDER — ENOXAPARIN SODIUM 100 MG/ML
30 INJECTION SUBCUTANEOUS DAILY
Status: ON HOLD | COMMUNITY
End: 2018-01-01 | Stop reason: HOSPADM

## 2018-01-01 RX ORDER — HYDROMORPHONE HYDROCHLORIDE 1 MG/ML
0.2 INJECTION, SOLUTION INTRAMUSCULAR; INTRAVENOUS; SUBCUTANEOUS EVERY 4 HOURS PRN
Status: DISCONTINUED | OUTPATIENT
Start: 2018-01-01 | End: 2018-01-01

## 2018-01-01 RX ORDER — NALOXONE HYDROCHLORIDE 1 MG/ML
0.4 INJECTION INTRAMUSCULAR; INTRAVENOUS; SUBCUTANEOUS ONCE
Status: DISCONTINUED | OUTPATIENT
Start: 2018-01-01 | End: 2018-01-01

## 2018-01-01 RX ORDER — FUROSEMIDE 10 MG/ML
120 INJECTION INTRAMUSCULAR; INTRAVENOUS ONCE
Status: COMPLETED | OUTPATIENT
Start: 2018-01-01 | End: 2018-01-01

## 2018-01-01 RX ORDER — CARVEDILOL 6.25 MG/1
6.25 TABLET ORAL 2 TIMES DAILY
Status: DISCONTINUED | OUTPATIENT
Start: 2018-01-01 | End: 2018-01-01 | Stop reason: HOSPADM

## 2018-01-01 RX ORDER — AMLODIPINE BESYLATE 5 MG/1
5 TABLET ORAL DAILY
Qty: 30 TABLET | Refills: 11 | Status: ON HOLD | OUTPATIENT
Start: 2018-01-01 | End: 2018-01-01

## 2018-01-01 RX ORDER — SODIUM CHLORIDE 0.9 % (FLUSH) 0.9 %
3 SYRINGE (ML) INJECTION EVERY 8 HOURS
Status: DISCONTINUED | OUTPATIENT
Start: 2018-01-01 | End: 2018-01-01 | Stop reason: HOSPADM

## 2018-01-01 RX ORDER — CIPROFLOXACIN 2 MG/ML
400 INJECTION, SOLUTION INTRAVENOUS
Status: DISCONTINUED | OUTPATIENT
Start: 2018-01-01 | End: 2018-01-01

## 2018-01-01 RX ORDER — BACITRACIN ZINC 500 UNIT/G
OINTMENT (GRAM) TOPICAL
Status: DISCONTINUED | OUTPATIENT
Start: 2018-01-01 | End: 2018-01-01 | Stop reason: HOSPADM

## 2018-01-01 RX ORDER — SODIUM CHLORIDE 0.9 % (FLUSH) 0.9 %
3 SYRINGE (ML) INJECTION
Status: DISCONTINUED | OUTPATIENT
Start: 2018-01-01 | End: 2018-01-01 | Stop reason: HOSPADM

## 2018-01-01 RX ORDER — ENOXAPARIN SODIUM 100 MG/ML
30 INJECTION SUBCUTANEOUS EVERY 24 HOURS
Status: DISCONTINUED | OUTPATIENT
Start: 2018-01-01 | End: 2018-01-01 | Stop reason: HOSPADM

## 2018-01-01 RX ORDER — LORAZEPAM 1 MG/1
1 TABLET ORAL EVERY 30 MIN PRN
Status: DISCONTINUED | OUTPATIENT
Start: 2018-01-01 | End: 2018-01-01

## 2018-01-01 RX ORDER — EPHEDRINE SULFATE 50 MG/ML
INJECTION, SOLUTION INTRAVENOUS
Status: DISCONTINUED | OUTPATIENT
Start: 2018-01-01 | End: 2018-01-01

## 2018-01-01 RX ORDER — OSELTAMIVIR PHOSPHATE 75 MG/1
75 CAPSULE ORAL DAILY
Qty: 10 CAPSULE | Refills: 0 | Status: SHIPPED | OUTPATIENT
Start: 2018-01-01 | End: 2018-01-01

## 2018-01-01 RX ORDER — ACETAMINOPHEN 325 MG/1
650 TABLET ORAL EVERY 6 HOURS PRN
Status: DISCONTINUED | OUTPATIENT
Start: 2018-01-01 | End: 2018-01-01 | Stop reason: HOSPADM

## 2018-01-01 RX ORDER — HYDROCODONE BITARTRATE AND ACETAMINOPHEN 10; 325 MG/1; MG/1
1 TABLET ORAL EVERY 4 HOURS PRN
Status: DISCONTINUED | OUTPATIENT
Start: 2018-01-01 | End: 2018-01-01 | Stop reason: HOSPADM

## 2018-01-01 RX ORDER — ASPIRIN 81 MG/1
81 TABLET ORAL NIGHTLY
Status: DISCONTINUED | OUTPATIENT
Start: 2018-01-01 | End: 2018-01-01

## 2018-01-01 RX ORDER — DOCUSATE SODIUM 100 MG/1
100 CAPSULE, LIQUID FILLED ORAL 2 TIMES DAILY
Status: ON HOLD | COMMUNITY
End: 2018-01-01 | Stop reason: HOSPADM

## 2018-01-01 RX ORDER — ENOXAPARIN SODIUM 100 MG/ML
30 INJECTION SUBCUTANEOUS EVERY 24 HOURS
Status: DISCONTINUED | OUTPATIENT
Start: 2018-01-01 | End: 2018-01-01

## 2018-01-01 RX ORDER — ENOXAPARIN SODIUM 100 MG/ML
40 INJECTION SUBCUTANEOUS EVERY 24 HOURS
Status: DISCONTINUED | OUTPATIENT
Start: 2018-01-01 | End: 2018-01-01

## 2018-01-01 RX ORDER — SODIUM CHLORIDE 9 MG/ML
INJECTION, SOLUTION INTRAVENOUS CONTINUOUS
Status: DISCONTINUED | OUTPATIENT
Start: 2018-01-01 | End: 2018-01-01

## 2018-01-01 RX ORDER — HYDROCODONE BITARTRATE AND ACETAMINOPHEN 10; 325 MG/1; MG/1
1 TABLET ORAL EVERY 4 HOURS PRN
Status: DISCONTINUED | OUTPATIENT
Start: 2018-01-01 | End: 2018-01-01

## 2018-01-01 RX ORDER — HYDROCODONE BITARTRATE AND ACETAMINOPHEN 5; 325 MG/1; MG/1
1 TABLET ORAL EVERY 6 HOURS PRN
Status: DISCONTINUED | OUTPATIENT
Start: 2018-01-01 | End: 2018-01-01

## 2018-01-01 RX ORDER — SODIUM CHLORIDE 9 MG/ML
INJECTION, SOLUTION INTRAVENOUS CONTINUOUS PRN
Status: DISCONTINUED | OUTPATIENT
Start: 2018-01-01 | End: 2018-01-01

## 2018-01-01 RX ORDER — NALOXONE HCL 0.4 MG/ML
0.4 VIAL (ML) INJECTION
Status: DISCONTINUED | OUTPATIENT
Start: 2018-01-01 | End: 2018-01-01

## 2018-01-01 RX ORDER — CEFAZOLIN SODIUM 1 G/3ML
2 INJECTION, POWDER, FOR SOLUTION INTRAMUSCULAR; INTRAVENOUS
Status: COMPLETED | OUTPATIENT
Start: 2018-01-01 | End: 2018-01-01

## 2018-01-01 RX ORDER — METRONIDAZOLE 500 MG/100ML
500 INJECTION, SOLUTION INTRAVENOUS
Status: DISCONTINUED | OUTPATIENT
Start: 2018-01-01 | End: 2018-01-01

## 2018-01-01 RX ORDER — CARVEDILOL 6.25 MG/1
6.25 TABLET ORAL 2 TIMES DAILY
Qty: 60 TABLET | Refills: 11 | Status: ON HOLD | OUTPATIENT
Start: 2018-01-01 | End: 2018-01-01

## 2018-01-01 RX ORDER — HEPARIN SODIUM 5000 [USP'U]/ML
5000 INJECTION, SOLUTION INTRAVENOUS; SUBCUTANEOUS EVERY 8 HOURS
Status: DISCONTINUED | OUTPATIENT
Start: 2018-01-01 | End: 2018-01-01 | Stop reason: HOSPADM

## 2018-01-01 RX ORDER — ATORVASTATIN CALCIUM 20 MG/1
40 TABLET, FILM COATED ORAL DAILY
Status: DISCONTINUED | OUTPATIENT
Start: 2018-01-01 | End: 2018-01-01

## 2018-01-01 RX ORDER — ASPIRIN 81 MG/1
81 TABLET ORAL NIGHTLY
Status: DISCONTINUED | OUTPATIENT
Start: 2018-01-01 | End: 2018-01-01 | Stop reason: HOSPADM

## 2018-01-01 RX ORDER — LACTULOSE 10 G/15ML
SOLUTION ORAL; RECTAL
Status: ON HOLD | COMMUNITY
End: 2018-01-01 | Stop reason: HOSPADM

## 2018-01-01 RX ORDER — LISINOPRIL 40 MG/1
40 TABLET ORAL DAILY
Status: ON HOLD | COMMUNITY
End: 2018-01-01 | Stop reason: HOSPADM

## 2018-01-01 RX ORDER — IPRATROPIUM BROMIDE AND ALBUTEROL SULFATE 2.5; .5 MG/3ML; MG/3ML
3 SOLUTION RESPIRATORY (INHALATION) EVERY 6 HOURS PRN
Status: ON HOLD | COMMUNITY
End: 2018-01-01 | Stop reason: HOSPADM

## 2018-01-01 RX ORDER — ALBUTEROL SULFATE 0.83 MG/ML
2.5 SOLUTION RESPIRATORY (INHALATION) EVERY 4 HOURS PRN
Status: DISCONTINUED | OUTPATIENT
Start: 2018-01-01 | End: 2018-01-01 | Stop reason: SDUPTHER

## 2018-01-01 RX ORDER — MODAFINIL 100 MG/1
100 TABLET ORAL DAILY
Status: ON HOLD | COMMUNITY
End: 2018-01-01 | Stop reason: HOSPADM

## 2018-01-01 RX ORDER — PANTOPRAZOLE SODIUM 40 MG/1
40 TABLET, DELAYED RELEASE ORAL DAILY
Status: ON HOLD | COMMUNITY
End: 2018-01-01 | Stop reason: HOSPADM

## 2018-01-01 RX ORDER — LIDOCAINE HCL/PF 100 MG/5ML
SYRINGE (ML) INTRAVENOUS
Status: DISCONTINUED | OUTPATIENT
Start: 2018-01-01 | End: 2018-01-01

## 2018-01-01 RX ORDER — ATORVASTATIN CALCIUM 40 MG/1
40 TABLET, FILM COATED ORAL DAILY
Qty: 90 TABLET | Refills: 3 | Status: ON HOLD | OUTPATIENT
Start: 2018-01-01 | End: 2018-01-01 | Stop reason: HOSPADM

## 2018-01-01 RX ORDER — PANTOPRAZOLE SODIUM 40 MG/10ML
80 INJECTION, POWDER, LYOPHILIZED, FOR SOLUTION INTRAVENOUS DAILY
Status: DISCONTINUED | OUTPATIENT
Start: 2018-01-01 | End: 2018-01-01

## 2018-01-01 RX ORDER — SIMETHICONE 125 MG
125 TABLET,CHEWABLE ORAL
Status: ON HOLD | COMMUNITY
End: 2018-01-01 | Stop reason: HOSPADM

## 2018-01-01 RX ORDER — PANTOPRAZOLE SODIUM 40 MG/10ML
40 INJECTION, POWDER, LYOPHILIZED, FOR SOLUTION INTRAVENOUS 2 TIMES DAILY
Status: DISCONTINUED | OUTPATIENT
Start: 2018-01-01 | End: 2018-01-01

## 2018-01-01 RX ORDER — ONDANSETRON 2 MG/ML
4 INJECTION INTRAMUSCULAR; INTRAVENOUS EVERY 8 HOURS PRN
Status: DISCONTINUED | OUTPATIENT
Start: 2018-01-01 | End: 2018-01-01 | Stop reason: HOSPADM

## 2018-01-01 RX ORDER — GLUCAGON 1 MG
1 KIT INJECTION
Status: DISCONTINUED | OUTPATIENT
Start: 2018-01-01 | End: 2018-01-01 | Stop reason: HOSPADM

## 2018-01-01 RX ORDER — POLYETHYLENE GLYCOL 3350 17 G/17G
17 POWDER, FOR SOLUTION ORAL ONCE
Status: COMPLETED | OUTPATIENT
Start: 2018-01-01 | End: 2018-01-01

## 2018-01-01 RX ORDER — NALOXONE HCL 0.4 MG/ML
VIAL (ML) INJECTION
Status: DISPENSED
Start: 2018-01-01 | End: 2018-01-01

## 2018-01-01 RX ORDER — ACETAMINOPHEN 325 MG/1
650 TABLET ORAL EVERY 4 HOURS PRN
Status: DISCONTINUED | OUTPATIENT
Start: 2018-01-01 | End: 2018-01-01

## 2018-01-01 RX ORDER — CARVEDILOL 3.12 MG/1
3.12 TABLET ORAL 2 TIMES DAILY
Status: ON HOLD | COMMUNITY
End: 2018-01-01 | Stop reason: HOSPADM

## 2018-01-01 RX ORDER — HYDROCODONE BITARTRATE AND ACETAMINOPHEN 5; 325 MG/1; MG/1
1 TABLET ORAL EVERY 6 HOURS PRN
Qty: 30 TABLET | Refills: 0 | Status: ON HOLD | OUTPATIENT
Start: 2018-01-01 | End: 2018-01-01 | Stop reason: HOSPADM

## 2018-01-01 RX ORDER — ONDANSETRON 2 MG/ML
4 INJECTION INTRAMUSCULAR; INTRAVENOUS ONCE AS NEEDED
Status: DISCONTINUED | OUTPATIENT
Start: 2018-01-01 | End: 2018-01-01 | Stop reason: HOSPADM

## 2018-01-01 RX ORDER — CEFAZOLIN SODIUM 1 G/3ML
2 INJECTION, POWDER, FOR SOLUTION INTRAMUSCULAR; INTRAVENOUS
Status: ACTIVE | OUTPATIENT
Start: 2018-01-01 | End: 2018-01-01

## 2018-01-01 RX ORDER — TRAMADOL HYDROCHLORIDE 50 MG/1
50 TABLET ORAL EVERY 6 HOURS PRN
Status: ON HOLD
Start: 2018-01-01 | End: 2018-01-01

## 2018-01-01 RX ORDER — CEFTRIAXONE 1 G/1
1 INJECTION, POWDER, FOR SOLUTION INTRAMUSCULAR; INTRAVENOUS
Status: COMPLETED | OUTPATIENT
Start: 2018-01-01 | End: 2018-01-01

## 2018-01-01 RX ORDER — BISACODYL 5 MG
5 TABLET, DELAYED RELEASE (ENTERIC COATED) ORAL DAILY PRN
Status: ON HOLD | COMMUNITY
End: 2018-01-01 | Stop reason: HOSPADM

## 2018-01-01 RX ORDER — ACETAMINOPHEN 325 MG/1
650 TABLET ORAL EVERY 4 HOURS PRN
Status: DISCONTINUED | OUTPATIENT
Start: 2018-01-01 | End: 2018-01-01 | Stop reason: HOSPADM

## 2018-01-01 RX ORDER — IPRATROPIUM BROMIDE AND ALBUTEROL SULFATE 2.5; .5 MG/3ML; MG/3ML
3 SOLUTION RESPIRATORY (INHALATION) ONCE
Status: DISCONTINUED | OUTPATIENT
Start: 2018-01-01 | End: 2018-01-01 | Stop reason: HOSPADM

## 2018-01-01 RX ORDER — PROPOFOL 10 MG/ML
VIAL (ML) INTRAVENOUS
Status: DISCONTINUED | OUTPATIENT
Start: 2018-01-01 | End: 2018-01-01

## 2018-01-01 RX ORDER — ONDANSETRON 2 MG/ML
4 INJECTION INTRAMUSCULAR; INTRAVENOUS
Status: COMPLETED | OUTPATIENT
Start: 2018-01-01 | End: 2018-01-01

## 2018-01-01 RX ORDER — RAMELTEON 8 MG/1
8 TABLET ORAL NIGHTLY PRN
Status: DISCONTINUED | OUTPATIENT
Start: 2018-01-01 | End: 2018-01-01 | Stop reason: HOSPADM

## 2018-01-01 RX ORDER — LISINOPRIL 10 MG/1
10 TABLET ORAL DAILY
Status: DISCONTINUED | OUTPATIENT
Start: 2018-01-01 | End: 2018-01-01

## 2018-01-01 RX ORDER — ONDANSETRON 4 MG/1
4 TABLET, ORALLY DISINTEGRATING ORAL EVERY 8 HOURS PRN
Status: DISCONTINUED | OUTPATIENT
Start: 2018-01-01 | End: 2018-01-01 | Stop reason: HOSPADM

## 2018-01-01 RX ORDER — DEXTROMETHORPHAN HYDROBROMIDE, GUAIFENESIN 5; 100 MG/5ML; MG/5ML
650 LIQUID ORAL
Status: ON HOLD | COMMUNITY
End: 2018-01-01 | Stop reason: HOSPADM

## 2018-01-01 RX ORDER — HEPARIN SODIUM 5000 [USP'U]/ML
5000 INJECTION, SOLUTION INTRAVENOUS; SUBCUTANEOUS EVERY 8 HOURS
Status: DISCONTINUED | OUTPATIENT
Start: 2018-01-01 | End: 2018-01-01

## 2018-01-01 RX ORDER — LIDOCAINE HYDROCHLORIDE 10 MG/ML
INJECTION INFILTRATION; PERINEURAL
Status: DISPENSED
Start: 2018-01-01 | End: 2018-01-01

## 2018-01-01 RX ORDER — FENTANYL CITRATE 50 UG/ML
25 INJECTION, SOLUTION INTRAMUSCULAR; INTRAVENOUS
Status: COMPLETED | OUTPATIENT
Start: 2018-01-01 | End: 2018-01-01

## 2018-01-01 RX ORDER — MIDAZOLAM HYDROCHLORIDE 1 MG/ML
0.5 INJECTION INTRAMUSCULAR; INTRAVENOUS
Status: DISCONTINUED | OUTPATIENT
Start: 2018-01-01 | End: 2018-01-01 | Stop reason: HOSPADM

## 2018-01-01 RX ORDER — FENTANYL CITRATE 50 UG/ML
INJECTION, SOLUTION INTRAMUSCULAR; INTRAVENOUS
Status: DISCONTINUED | OUTPATIENT
Start: 2018-01-01 | End: 2018-01-01

## 2018-01-01 RX ORDER — LABETALOL HYDROCHLORIDE 5 MG/ML
10 INJECTION, SOLUTION INTRAVENOUS
Status: DISCONTINUED | OUTPATIENT
Start: 2018-01-01 | End: 2018-01-01

## 2018-01-01 RX ORDER — CEFTRIAXONE 1 G/1
1 INJECTION, POWDER, FOR SOLUTION INTRAMUSCULAR; INTRAVENOUS
Status: DISCONTINUED | OUTPATIENT
Start: 2018-01-01 | End: 2018-01-01

## 2018-01-01 RX ORDER — LORAZEPAM 2 MG/ML
0.5 INJECTION INTRAMUSCULAR EVERY 30 MIN PRN
Status: DISCONTINUED | OUTPATIENT
Start: 2018-01-01 | End: 2018-01-01 | Stop reason: HOSPADM

## 2018-01-01 RX ORDER — ALBUTEROL SULFATE 0.83 MG/ML
2.5 SOLUTION RESPIRATORY (INHALATION) EVERY 4 HOURS PRN
Status: DISCONTINUED | OUTPATIENT
Start: 2018-01-01 | End: 2018-01-01 | Stop reason: HOSPADM

## 2018-01-01 RX ORDER — ONDANSETRON 2 MG/ML
4 INJECTION INTRAMUSCULAR; INTRAVENOUS EVERY 6 HOURS PRN
Status: DISCONTINUED | OUTPATIENT
Start: 2018-01-01 | End: 2018-01-01 | Stop reason: HOSPADM

## 2018-01-01 RX ORDER — AMLODIPINE BESYLATE 5 MG/1
5 TABLET ORAL DAILY
Status: DISCONTINUED | OUTPATIENT
Start: 2018-01-01 | End: 2018-01-01

## 2018-01-01 RX ORDER — ASPIRIN 81 MG/1
81 TABLET ORAL DAILY
Status: DISCONTINUED | OUTPATIENT
Start: 2018-01-01 | End: 2018-01-01

## 2018-01-01 RX ORDER — HYDROMORPHONE HYDROCHLORIDE 1 MG/ML
0.2 INJECTION, SOLUTION INTRAMUSCULAR; INTRAVENOUS; SUBCUTANEOUS ONCE
Status: COMPLETED | OUTPATIENT
Start: 2018-01-01 | End: 2018-01-01

## 2018-01-01 RX ORDER — GLYCOPYRROLATE 0.2 MG/ML
INJECTION INTRAMUSCULAR; INTRAVENOUS
Status: DISCONTINUED | OUTPATIENT
Start: 2018-01-01 | End: 2018-01-01

## 2018-01-01 RX ORDER — PANTOPRAZOLE SODIUM 40 MG/10ML
80 INJECTION, POWDER, LYOPHILIZED, FOR SOLUTION INTRAVENOUS
Status: COMPLETED | OUTPATIENT
Start: 2018-01-01 | End: 2018-01-01

## 2018-01-01 RX ORDER — GLYCOPYRROLATE 0.2 MG/ML
0.2 INJECTION INTRAMUSCULAR; INTRAVENOUS 4 TIMES DAILY PRN
Status: DISCONTINUED | OUTPATIENT
Start: 2018-01-01 | End: 2018-01-01 | Stop reason: HOSPADM

## 2018-01-01 RX ORDER — HYDROCODONE BITARTRATE AND ACETAMINOPHEN 5; 325 MG/1; MG/1
1 TABLET ORAL EVERY 4 HOURS PRN
Status: DISCONTINUED | OUTPATIENT
Start: 2018-01-01 | End: 2018-01-01 | Stop reason: HOSPADM

## 2018-01-01 RX ORDER — ONDANSETRON 4 MG/1
8 TABLET, ORALLY DISINTEGRATING ORAL EVERY 8 HOURS PRN
Status: DISCONTINUED | OUTPATIENT
Start: 2018-01-01 | End: 2018-01-01 | Stop reason: HOSPADM

## 2018-01-01 RX ORDER — METRONIDAZOLE 500 MG/100ML
500 INJECTION, SOLUTION INTRAVENOUS
Status: COMPLETED | OUTPATIENT
Start: 2018-01-01 | End: 2018-01-01

## 2018-01-01 RX ORDER — TRAMADOL HYDROCHLORIDE 50 MG/1
50 TABLET ORAL EVERY 6 HOURS PRN
Status: DISCONTINUED | OUTPATIENT
Start: 2018-01-01 | End: 2018-01-01 | Stop reason: HOSPADM

## 2018-01-01 RX ORDER — HYDROCODONE BITARTRATE AND ACETAMINOPHEN 500; 5 MG/1; MG/1
TABLET ORAL
Status: DISCONTINUED | OUTPATIENT
Start: 2018-01-01 | End: 2018-01-01 | Stop reason: HOSPADM

## 2018-01-01 RX ORDER — ACETAMINOPHEN 10 MG/ML
1000 INJECTION, SOLUTION INTRAVENOUS ONCE
Status: COMPLETED | OUTPATIENT
Start: 2018-01-01 | End: 2018-01-01

## 2018-01-01 RX ORDER — HYDROCODONE BITARTRATE AND ACETAMINOPHEN 10; 325 MG/1; MG/1
TABLET ORAL
Status: COMPLETED
Start: 2018-01-01 | End: 2018-01-01

## 2018-01-01 RX ADMIN — LABETALOL HYDROCHLORIDE 10 MG: 5 INJECTION, SOLUTION INTRAVENOUS at 08:09

## 2018-01-01 RX ADMIN — Medication 0.5 MG: at 04:09

## 2018-01-01 RX ADMIN — AMLODIPINE BESYLATE 5 MG: 5 TABLET ORAL at 11:09

## 2018-01-01 RX ADMIN — PROPOFOL 20 MG: 10 INJECTION, EMULSION INTRAVENOUS at 02:09

## 2018-01-01 RX ADMIN — BUPIVACAINE HYDROCHLORIDE AND EPINEPHRINE BITARTRATE 30 ML: 5; .0091 INJECTION, SOLUTION EPIDURAL; INTRACAUDAL; PERINEURAL at 08:09

## 2018-01-01 RX ADMIN — AMLODIPINE BESYLATE 5 MG: 5 TABLET ORAL at 09:09

## 2018-01-01 RX ADMIN — PROPOFOL 20 MG: 10 INJECTION, EMULSION INTRAVENOUS at 03:09

## 2018-01-01 RX ADMIN — SODIUM CHLORIDE: 0.9 INJECTION, SOLUTION INTRAVENOUS at 07:09

## 2018-01-01 RX ADMIN — SODIUM CHLORIDE: 0.9 INJECTION, SOLUTION INTRAVENOUS at 02:09

## 2018-01-01 RX ADMIN — PANTOPRAZOLE SODIUM 80 MG: 40 INJECTION, POWDER, FOR SOLUTION INTRAVENOUS at 10:09

## 2018-01-01 RX ADMIN — ENOXAPARIN SODIUM 40 MG: 100 INJECTION SUBCUTANEOUS at 06:09

## 2018-01-01 RX ADMIN — PANTOPRAZOLE SODIUM 40 MG: 40 INJECTION, POWDER, FOR SOLUTION INTRAVENOUS at 08:09

## 2018-01-01 RX ADMIN — CARVEDILOL 6.25 MG: 6.25 TABLET, FILM COATED ORAL at 08:09

## 2018-01-01 RX ADMIN — GLYCOPYRROLATE 0.2 MG: 0.2 INJECTION, SOLUTION INTRAMUSCULAR; INTRAVENOUS at 09:09

## 2018-01-01 RX ADMIN — AMLODIPINE BESYLATE 5 MG: 5 TABLET ORAL at 10:09

## 2018-01-01 RX ADMIN — Medication 3 ML: at 10:09

## 2018-01-01 RX ADMIN — ONDANSETRON 4 MG: 2 INJECTION INTRAMUSCULAR; INTRAVENOUS at 12:09

## 2018-01-01 RX ADMIN — EPHEDRINE SULFATE 20 MG: 50 INJECTION, SOLUTION INTRAMUSCULAR; INTRAVENOUS; SUBCUTANEOUS at 09:09

## 2018-01-01 RX ADMIN — PIPERACILLIN AND TAZOBACTAM 4.5 G: 4; .5 INJECTION, POWDER, LYOPHILIZED, FOR SOLUTION INTRAVENOUS; PARENTERAL at 12:09

## 2018-01-01 RX ADMIN — CARVEDILOL 6.25 MG: 6.25 TABLET, FILM COATED ORAL at 09:09

## 2018-01-01 RX ADMIN — Medication 0.2 MG: at 03:09

## 2018-01-01 RX ADMIN — AMLODIPINE BESYLATE 5 MG: 5 TABLET ORAL at 12:09

## 2018-01-01 RX ADMIN — MORPHINE SULFATE 1 MG: 10 INJECTION INTRAVENOUS at 02:09

## 2018-01-01 RX ADMIN — ATORVASTATIN CALCIUM 40 MG: 20 TABLET, FILM COATED ORAL at 11:09

## 2018-01-01 RX ADMIN — ACETAMINOPHEN 1000 MG: 10 INJECTION, SOLUTION INTRAVENOUS at 11:09

## 2018-01-01 RX ADMIN — LIDOCAINE HYDROCHLORIDE 20 ML: 10 INJECTION, SOLUTION INFILTRATION; PERINEURAL at 03:09

## 2018-01-01 RX ADMIN — MORPHINE SULFATE 1 MG/HR: 10 INJECTION, SOLUTION INTRAMUSCULAR; INTRAVENOUS at 06:09

## 2018-01-01 RX ADMIN — SODIUM CHLORIDE 1000 ML: 0.9 INJECTION, SOLUTION INTRAVENOUS at 04:09

## 2018-01-01 RX ADMIN — Medication 3 ML: at 06:09

## 2018-01-01 RX ADMIN — BUPIVACAINE HYDROCHLORIDE AND EPINEPHRINE BITARTRATE 10 ML: 5; .0091 INJECTION, SOLUTION EPIDURAL; INTRACAUDAL; PERINEURAL at 08:09

## 2018-01-01 RX ADMIN — ALBUTEROL SULFATE 2.5 MG: 2.5 SOLUTION RESPIRATORY (INHALATION) at 08:09

## 2018-01-01 RX ADMIN — HEPARIN SODIUM 5000 UNITS: 5000 INJECTION, SOLUTION INTRAVENOUS; SUBCUTANEOUS at 05:09

## 2018-01-01 RX ADMIN — ASPIRIN 81 MG: 81 TABLET, COATED ORAL at 09:09

## 2018-01-01 RX ADMIN — CEFAZOLIN 2 G: 1 INJECTION, POWDER, FOR SOLUTION INTRAMUSCULAR; INTRAVENOUS at 03:09

## 2018-01-01 RX ADMIN — PANTOPRAZOLE SODIUM 40 MG: 40 INJECTION, POWDER, FOR SOLUTION INTRAVENOUS at 10:09

## 2018-01-01 RX ADMIN — CARVEDILOL 6.25 MG: 6.25 TABLET, FILM COATED ORAL at 10:09

## 2018-01-01 RX ADMIN — FENTANYL CITRATE 25 MCG: 50 INJECTION INTRAMUSCULAR; INTRAVENOUS at 08:09

## 2018-01-01 RX ADMIN — FENTANYL CITRATE 25 MCG: 50 INJECTION INTRAMUSCULAR; INTRAVENOUS at 02:09

## 2018-01-01 RX ADMIN — ATORVASTATIN CALCIUM 40 MG: 20 TABLET, FILM COATED ORAL at 10:09

## 2018-01-01 RX ADMIN — METRONIDAZOLE 500 MG: 500 INJECTION, SOLUTION INTRAVENOUS at 08:09

## 2018-01-01 RX ADMIN — SODIUM CHLORIDE, SODIUM LACTATE, POTASSIUM CHLORIDE, AND CALCIUM CHLORIDE 1000 ML: .6; .31; .03; .02 INJECTION, SOLUTION INTRAVENOUS at 11:09

## 2018-01-01 RX ADMIN — DEXTROSE MONOHYDRATE 25 G: 25 INJECTION, SOLUTION INTRAVENOUS at 07:09

## 2018-01-01 RX ADMIN — Medication 3 ML: at 02:09

## 2018-01-01 RX ADMIN — HYDROCODONE BITARTRATE AND ACETAMINOPHEN 1 TABLET: 10; 325 TABLET ORAL at 11:09

## 2018-01-01 RX ADMIN — IOHEXOL 75 ML: 350 INJECTION, SOLUTION INTRAVENOUS at 10:09

## 2018-01-01 RX ADMIN — LISINOPRIL 20 MG: 20 TABLET ORAL at 09:09

## 2018-01-01 RX ADMIN — Medication 0.2 MG: at 02:09

## 2018-01-01 RX ADMIN — ACETAMINOPHEN 650 MG: 325 TABLET ORAL at 01:09

## 2018-01-01 RX ADMIN — SODIUM CHLORIDE: 0.9 INJECTION, SOLUTION INTRAVENOUS at 05:09

## 2018-01-01 RX ADMIN — HYDROCODONE BITARTRATE AND ACETAMINOPHEN 1 TABLET: 5; 325 TABLET ORAL at 09:09

## 2018-01-01 RX ADMIN — SODIUM CHLORIDE: 0.9 INJECTION, SOLUTION INTRAVENOUS at 08:09

## 2018-01-01 RX ADMIN — IOHEXOL 75 ML: 350 INJECTION, SOLUTION INTRAVENOUS at 08:09

## 2018-01-01 RX ADMIN — SODIUM CHLORIDE 500 ML: 0.9 INJECTION, SOLUTION INTRAVENOUS at 10:09

## 2018-01-01 RX ADMIN — SODIUM CHLORIDE: 0.9 INJECTION, SOLUTION INTRAVENOUS at 04:09

## 2018-01-01 RX ADMIN — Medication 3 ML: at 03:09

## 2018-01-01 RX ADMIN — LISINOPRIL 10 MG: 10 TABLET ORAL at 12:09

## 2018-01-01 RX ADMIN — Medication 3 ML: at 09:09

## 2018-01-01 RX ADMIN — LISINOPRIL 20 MG: 20 TABLET ORAL at 10:09

## 2018-01-01 RX ADMIN — ASPIRIN 81 MG: 81 TABLET, COATED ORAL at 08:09

## 2018-01-01 RX ADMIN — LIDOCAINE HYDROCHLORIDE 75 MG: 20 INJECTION, SOLUTION INTRAVENOUS at 02:09

## 2018-01-01 RX ADMIN — ENOXAPARIN SODIUM 30 MG: 100 INJECTION SUBCUTANEOUS at 06:09

## 2018-01-01 RX ADMIN — FENTANYL CITRATE 25 MCG: 50 INJECTION, SOLUTION INTRAMUSCULAR; INTRAVENOUS at 10:09

## 2018-01-01 RX ADMIN — MORPHINE SULFATE 1 MG: 10 INJECTION INTRAVENOUS at 03:09

## 2018-01-01 RX ADMIN — IPRATROPIUM BROMIDE AND ALBUTEROL SULFATE 3 ML: .5; 3 SOLUTION RESPIRATORY (INHALATION) at 07:09

## 2018-01-01 RX ADMIN — HEPARIN SODIUM 5000 UNITS: 5000 INJECTION, SOLUTION INTRAVENOUS; SUBCUTANEOUS at 09:09

## 2018-01-01 RX ADMIN — METRONIDAZOLE 500 MG: 500 INJECTION, SOLUTION INTRAVENOUS at 02:09

## 2018-01-01 RX ADMIN — SCOPALAMINE 1 PATCH: 1 PATCH, EXTENDED RELEASE TRANSDERMAL at 12:09

## 2018-01-01 RX ADMIN — Medication 0.5 MG: at 05:09

## 2018-01-01 RX ADMIN — CIPROFLOXACIN 400 MG: 2 INJECTION, SOLUTION INTRAVENOUS at 04:09

## 2018-01-01 RX ADMIN — LISINOPRIL 20 MG: 20 TABLET ORAL at 11:09

## 2018-01-01 RX ADMIN — CEFTRIAXONE SODIUM 1 G: 1 INJECTION, POWDER, FOR SOLUTION INTRAMUSCULAR; INTRAVENOUS at 02:09

## 2018-01-01 RX ADMIN — ATORVASTATIN CALCIUM 40 MG: 20 TABLET, FILM COATED ORAL at 09:09

## 2018-01-01 RX ADMIN — PANTOPRAZOLE SODIUM 40 MG: 40 INJECTION, POWDER, FOR SOLUTION INTRAVENOUS at 09:09

## 2018-01-01 RX ADMIN — MORPHINE SULFATE 1 MG: 10 INJECTION INTRAVENOUS at 01:09

## 2018-01-01 RX ADMIN — CARVEDILOL 6.25 MG: 6.25 TABLET, FILM COATED ORAL at 11:09

## 2018-01-01 RX ADMIN — Medication 0.2 MG: at 11:09

## 2018-01-01 RX ADMIN — HEPARIN SODIUM 5000 UNITS: 5000 INJECTION, SOLUTION INTRAVENOUS; SUBCUTANEOUS at 03:09

## 2018-01-01 RX ADMIN — PROPOFOL 100 MG: 10 INJECTION, EMULSION INTRAVENOUS at 09:09

## 2018-01-01 RX ADMIN — POLYETHYLENE GLYCOL 3350 17 G: 17 POWDER, FOR SOLUTION ORAL at 12:09

## 2018-01-01 RX ADMIN — CEFAZOLIN 2 G: 1 INJECTION, POWDER, FOR SOLUTION INTRAMUSCULAR; INTRAVENOUS at 07:09

## 2018-01-01 RX ADMIN — TRAMADOL HYDROCHLORIDE 50 MG: 50 TABLET, FILM COATED ORAL at 06:09

## 2018-01-01 RX ADMIN — LIDOCAINE HYDROCHLORIDE 50 MG: 20 INJECTION, SOLUTION INTRAVENOUS at 09:09

## 2018-01-01 RX ADMIN — EPHEDRINE SULFATE 20 MG: 50 INJECTION, SOLUTION INTRAMUSCULAR; INTRAVENOUS; SUBCUTANEOUS at 10:09

## 2018-01-01 RX ADMIN — Medication 0.2 MG: at 06:09

## 2018-01-01 RX ADMIN — FUROSEMIDE 120 MG: 10 INJECTION, SOLUTION INTRAMUSCULAR; INTRAVENOUS at 05:09

## 2018-01-01 RX ADMIN — SODIUM CHLORIDE, SODIUM LACTATE, POTASSIUM CHLORIDE, AND CALCIUM CHLORIDE 1000 ML: .6; .31; .03; .02 INJECTION, SOLUTION INTRAVENOUS at 10:09

## 2018-01-01 RX ADMIN — METRONIDAZOLE 500 MG: 500 INJECTION, SOLUTION INTRAVENOUS at 09:09

## 2018-01-01 RX ADMIN — ENOXAPARIN SODIUM 30 MG: 100 INJECTION SUBCUTANEOUS at 05:09

## 2018-01-01 RX ADMIN — NALOXONE HYDROCHLORIDE 1 MG: 1 INJECTION PARENTERAL at 05:09

## 2018-01-01 RX ADMIN — MORPHINE SULFATE 1 MG: 10 INJECTION INTRAVENOUS at 04:09

## 2018-01-01 RX ADMIN — HYDROCODONE BITARTRATE AND ACETAMINOPHEN 1 TABLET: 5; 325 TABLET ORAL at 01:09

## 2018-01-01 RX ADMIN — SODIUM CHLORIDE 1000 ML: 0.9 INJECTION, SOLUTION INTRAVENOUS at 02:09

## 2018-01-01 RX ADMIN — Medication 3 ML: at 08:09

## 2018-01-01 RX ADMIN — IOHEXOL 15 ML: 350 INJECTION, SOLUTION INTRAVENOUS at 09:09

## 2018-01-01 RX ADMIN — PIPERACILLIN AND TAZOBACTAM 4.5 G: 4; .5 INJECTION, POWDER, LYOPHILIZED, FOR SOLUTION INTRAVENOUS; PARENTERAL at 01:09

## 2018-01-01 RX ADMIN — IOHEXOL 15 ML: 350 INJECTION, SOLUTION INTRAVENOUS at 08:09

## 2018-01-01 RX ADMIN — METRONIDAZOLE 500 MG: 500 INJECTION, SOLUTION INTRAVENOUS at 04:09

## 2018-01-01 RX ADMIN — PROPOFOL 50 MG: 10 INJECTION, EMULSION INTRAVENOUS at 02:09

## 2018-01-01 RX ADMIN — ACETAMINOPHEN 650 MG: 325 TABLET ORAL at 07:09

## 2018-01-01 RX ADMIN — SODIUM POLYSTYRENE SULFONATE 15 G: 15 SUSPENSION ORAL; RECTAL at 07:09

## 2018-09-05 PROBLEM — I10 ESSENTIAL HYPERTENSION: Status: ACTIVE | Noted: 2018-01-01

## 2018-09-05 PROBLEM — I63.89 ACUTE ISCHEMIC MULTIFOCAL MULTIPLE VASCULAR TERRITORIES STROKE: Status: ACTIVE | Noted: 2018-01-01

## 2018-09-05 PROBLEM — R79.89 ELEVATED TROPONIN: Status: ACTIVE | Noted: 2018-01-01

## 2018-09-05 PROBLEM — R79.1 ELEVATED INR: Status: ACTIVE | Noted: 2018-01-01

## 2018-09-05 PROBLEM — I63.9 CEREBROVASCULAR ACCIDENT (CVA): Status: ACTIVE | Noted: 2018-01-01

## 2018-09-05 PROBLEM — E78.2 MIXED HYPERLIPIDEMIA: Status: ACTIVE | Noted: 2018-01-01

## 2018-09-05 NOTE — HPI
85 year old with history of untreated HTN, macular degeneration who presents this evening with her family because of some altered mental status, double vision and visual hallucinations that have worsened over the past day. She has had no chest pain, ERIC, SOB, palpitations. Her SBP has remained 194/80- 227/88. Cardiology called for elevation in troponin 0.0899--0.888--1.375.    Patient remains ambulatory, is not SOB, denies ever having chest pressure or pain. Patient does not smoke cigarettes, denies EtOH, or illicit substances. Patient lives at home with her daughter and other relatives.

## 2018-09-05 NOTE — PROGRESS NOTES
Pt not able to perform MRI exam at this time. several attempts made  to removed pt. Earring, not possible transported back to ED, family members will continue to attempt to remove earring, pt left in the care of ED nurse, Hannah.

## 2018-09-05 NOTE — ED NOTES
Pt not able to do visual acuity. Pt reports she has macular degeneration and cannot see the screen.

## 2018-09-05 NOTE — ASSESSMENT & PLAN NOTE
85 year old female with long-standing untreated HTN who presents today with slurred speech, fatigue and dysequilibrium. She has a normal EKG, no chest pain or symptoms concerning for ACS. Her troponin elevation is most likely related to demand ischemia from long-standing HTN. Likely has some CAD given her age and HTN but nothing ischemic to explain her abnormal labs.     --recommend IV labetalol x 1  --will likely need at least two antihypertensive agents; recommend amlodipine 10mg and lisinopril 20mg to start  --further workup of her AMS per primary team, MRI pending  --trend troponin for one more check 6 hours after original; if significantly elevated from current, alert cardiology fellow on call  --check 2d echo with CFD in AM

## 2018-09-05 NOTE — ED PROVIDER NOTES
"Encounter Date: 9/5/2018       History     Chief Complaint   Patient presents with    Fatigue     patient states "I feel off" is having a hard time describing symptoms. Family reports stomach bug is going around family and they thought it was that but patient has been slightly confused and has difficulty finding words for 2 days now. Patient has a history of macular degeneration but has been complaining of blurry vision and some dizziness.      85-year-old female with medical history of osteoarthritis, macular degeneration presented to the ED with a chief complaint of lightheadedness and blurry vision.  Patient reports developing lightheadedness whenever she woke up yesterday morning. She describes the sensation as feeling like she is going to pass out. She reports worsening of her chronic blurry vision since yesterday additionally. Family at bedside states she has been running into furniture. Family states she was seeing 3 cats at one time when they were only 2 present. Patient reports the blurry vision is effecting both eyes equally. Family reports she appears confused and has had slurred speech at times. Family states she has not been able to have a steady conversation like she normally does. Patient denies facial asymmetry, numbness, paresthesias, unilateral extremity weakness. Patient reportedly had abdominal pain over the weekend that has since resolved. Patient reports having dysuria. She denies fever, chest pain, SOB, cough, nausea, vomiting, diarrhea, constipation, melena, hematochezia, hematuria, urinary frequency. Patient denies blood thinner use. Patient denies history of hypertension or using anti-hypertensive medications.           Review of patient's allergies indicates:  No Known Allergies  Past Medical History:   Diagnosis Date    Arthritis     Cataract     Macular degeneration      Past Surgical History:   Procedure Laterality Date    CATARACT EXTRACTION  2003    LEFT EYE     Family History "   Problem Relation Age of Onset    Retinal detachment Sister     Thyroid disease Sister     Amblyopia Neg Hx     Blindness Neg Hx     Cancer Neg Hx     Cataracts Neg Hx     Diabetes Neg Hx     Glaucoma Neg Hx     Hypertension Neg Hx     Macular degeneration Neg Hx     Strabismus Neg Hx     Stroke Neg Hx      Social History     Tobacco Use    Smoking status: Never Smoker    Smokeless tobacco: Never Used   Substance Use Topics    Alcohol use: No    Drug use: Not on file     Review of Systems   Constitutional: Negative for chills and fever.   HENT: Negative for congestion, sore throat and trouble swallowing.    Eyes: Positive for visual disturbance (blurry vision bilaterally). Negative for pain, redness and itching.   Respiratory: Negative for cough and shortness of breath.    Cardiovascular: Negative for chest pain and leg swelling.   Gastrointestinal: Negative for constipation, diarrhea, nausea and vomiting.   Genitourinary: Positive for dysuria. Negative for hematuria.   Musculoskeletal: Negative for neck pain and neck stiffness.   Skin: Negative for rash and wound.   Neurological: Positive for speech difficulty, weakness (generalized) and light-headedness. Negative for dizziness and facial asymmetry.       Physical Exam     Initial Vitals [09/05/18 1410]   BP Pulse Resp Temp SpO2   (!) 162/71 74 18 98.2 °F (36.8 °C) 95 %      MAP       --         Physical Exam    Constitutional: She appears well-developed and well-nourished. She is not diaphoretic. No distress.   Obese female   HENT:   Head: Normocephalic and atraumatic.   Mouth/Throat: No oropharyngeal exudate.   Dry mucus membranes   Eyes: Conjunctivae and EOM are normal. Pupils are equal, round, and reactive to light.   Not able to perform visual acuity test. Unable to correctly count fingers held 2 feet from face. Able to identify shapes. Peripheral vision better than central vision.   Neck: Normal range of motion. Neck supple.   Cardiovascular:  Normal rate and regular rhythm.   Pulmonary/Chest: Breath sounds normal. No respiratory distress. She has no wheezes.   Abdominal: Soft. Bowel sounds are normal. She exhibits no distension. There is no tenderness.   Musculoskeletal: Normal range of motion. She exhibits no edema or tenderness.   Neurological: She is alert and oriented to person, place, and time. No cranial nerve deficit or sensory deficit.   Follows commands appropriately       ED Course   Procedures  Labs Reviewed   CBC W/ AUTO DIFFERENTIAL - Abnormal; Notable for the following components:       Result Value    RDW 17.2 (*)     Mono # 1.1 (*)     Mono% 15.2 (*)     All other components within normal limits   COMPREHENSIVE METABOLIC PANEL - Abnormal; Notable for the following components:    Potassium 5.8 (*)     Creatinine 1.5 (*)     Albumin 3.0 (*)     Alkaline Phosphatase 146 (*)     AST 57 (*)     eGFR if  36.4 (*)     eGFR if non  31.5 (*)     All other components within normal limits   URINALYSIS, REFLEX TO URINE CULTURE - Abnormal; Notable for the following components:    Appearance, UA Hazy (*)     Protein, UA 1+ (*)     Leukocytes, UA Trace (*)     All other components within normal limits    Narrative:     Preferred Collection Type->Urine, Clean Catch  CUP   TROPONIN I - Abnormal; Notable for the following components:    Troponin I 0.899 (*)     All other components within normal limits   PROTIME-INR - Abnormal; Notable for the following components:    Prothrombin Time 34.9 (*)     INR 3.6 (*)     All other components within normal limits   APTT - Abnormal; Notable for the following components:    aPTT 33.6 (*)     All other components within normal limits   B-TYPE NATRIURETIC PEPTIDE - Abnormal; Notable for the following components:     (*)     All other components within normal limits    Narrative:     ADD ON BNP ORDER #[id number] PER MAGALY VALLES  09/05/2018  17:32    URINALYSIS MICROSCOPIC -  Abnormal; Notable for the following components:    RBC, UA 5 (*)     WBC, UA 7 (*)     Hyaline Casts, UA 10 (*)     All other components within normal limits    Narrative:     Preferred Collection Type->Urine, Clean Catch  CUP   B-TYPE NATRIURETIC PEPTIDE   TROPONIN I          Imaging Results          MRI Brain Without Contrast (Final result)  Result time 09/05/18 18:03:35    Final result by Ja Garland MD (09/05/18 18:03:35)                 Impression:      1. Multifocal punctate regions of restricted diffusion involving the left basal ganglia, left parietal region, left posterior corpus callosum, bilateral occipital lobes, and scattered within the cerebellum, concerning for multifocal punctate infarcts, likely of embolic source.  Is a focus of hemorrhage hemorrhage/hemosiderin deposition within the left basal ganglia, likely with associated infarct.  2. Sequela of chronic microvascular ischemic change noting remote appearing infarcts involving the cerebellum and likely cerebellar vermis.  3. Small amount of fluid within the left mastoid air cells.      Electronically signed by: Ja Garland MD  Date:    09/05/2018  Time:    18:03             Narrative:    EXAMINATION:  MRI BRAIN WITHOUT CONTRAST    CLINICAL HISTORY:  Neuro deficit(s), subacute;2 days of bilateral blurry vision, dysequilibrium, confusion; rule out posterior infarct; Disorientation, unspecified    TECHNIQUE:  Multiplanar multisequence MR imaging of the brain was performed without contrast.    COMPARISON:  None.    FINDINGS:  There is no intracranial mass.  There are multifocal regions of restricted diffusion with correlating low signal on ADC map, scattered within the cerebellum, bilateral occipital lobes, posterior right thalamus, left parietal region, right caudate, left thalamus, left posterior corpus callosum and left basal ganglia.  There is some hemosiderin deposition involving the focus within the left basal ganglia.  There are  multiple punctate foci of T2/flair signal abnormality within the white matter and heaven, likely related to chronic microvascular ischemic change.  There are suspected remote lacunar infarcts involving the cerebellum.  There is a punctate focus of gradient susceptibility within the left cerebellum suggesting sequela of previous hemorrhage/hemosiderin deposition.  There is a focus of low diffusion signal with mixed signal on T2 and FLAIR imaging within the cerebellar vermis, nonspecific however may reflect sequela of remote infarct.  There is a focus of high T2 signal, high T1 signal, with gradient susceptibility within the left basal ganglia concerning for hemorrhage.  This may be associated with infarct.  No significant surrounding edema.  There is no hydrocephalus. There are no significant extra-axial or extracranial abnormalities.    The globes, orbits, pituitary gland, pineal gland and craniocervical junction are normal in configuration.  There is fluid within the left mastoid air cells.                               X-Ray Chest 1 View (Final result)  Result time 09/05/18 14:53:38    Final result by Ja Garland MD (09/05/18 14:53:38)                 Impression:      1. Overall limited exam, particularly in the evaluation of the left lower lung zones related to overlying soft tissue.  Small left pleural effusion with or without consolidative change not excluded, correlation recommended.  PA and lateral as warranted.  Mildly prominent interstitial attenuation may be on the basis of habitus.      Electronically signed by: Ja Garland MD  Date:    09/05/2018  Time:    14:53             Narrative:    EXAMINATION:  XR CHEST 1 VIEW    CLINICAL HISTORY:  Disorientation, unspecified    TECHNIQUE:  Single frontal view of the chest was performed.    COMPARISON:  09/16/2006    FINDINGS:  Exam is limited by habitus.    The cardiomediastinal silhouette is prominent, noting calcification of the aorta..  There is  obscuration of the left costophrenic angle, suspected to be on the basis of overlying soft tissue, small effusion however cannot be excluded..  The trachea is midline.  The lungs are symmetrically expanded bilaterally with coarse interstitial attenuation.  Developing left retrocardiac consolidation not excluded..  There is no pneumothorax.  The osseous structures are remarkable for degenerative changes..                                       APC / Resident Notes:   85-year-old female with medical history of osteoarthritis, macular degeneration presented to the ED c/o 2 days of worsening blurry vision, confusion, lightheadedness, and dysuria. DDx broad and includes but not limited to dehydration, JAMES, UTI, electrolyte disturbance, pneumonia, macular degeneration, CVA. Will obtain labs and MRI brain to rule out posterior CVA.     5:49 PM  Troponin elevated 0.8 without baseline comparison. Patient denies chest pain and doubt ACS related. No ischemic findings on ECG. Discussed with cardiology and they will come see the patient. Consult placed. Patient hypertensive throughout ED stay and more likely 2/2 uncontrolled BP.     6:19 PM  Discussed MRI findings with stroke service. They will review imaging and call back.     6:44 PM  Stroke service reviewed imaging. They will admit the patient to their service for stroke work-up. Consult placed. I have discussed the care of this patient with my supervising physician.                  Clinical Impression:   The primary encounter diagnosis was Cerebrovascular accident (CVA), unspecified mechanism. Diagnoses of Confusion, Elevated troponin, Altered mental status, unspecified altered mental status type, and Hypertensive urgency were also pertinent to this visit.      Disposition:   Disposition: Admitted  Condition: Serious                        Joaquin Aiken PA-C  09/06/18 [id number]

## 2018-09-05 NOTE — CONSULTS
[facility]  Cardiology  Consult Note    Patient Name: Mitzy Perez  MRN: [id number]  Admission Date: 9/5/2018  Hospital Length of Stay: 0 days  Code Status: No Order   Attending Provider: Alexx Hagen DO   Consulting Provider: Vincent Guo MD  Primary Care Physician: Primary Doctor No  Principal Problem:<principal problem not specified>    Patient information was obtained from patient and relative(s).     Inpatient consult to Cardiology  Consult performed by: Vincent Guo MD  Consult ordered by: Joaquin Aiken PA-C        Subjective:     Chief Complaint:  Slurred speech     HPI:   85 year old with history of untreated HTN, macular degeneration who presents this evening with her family because of some altered mental status, double vision and visual hallucinations that have worsened over the past day. She has had no chest pain, ERIC, SOB, palpitations. Her BP is >200. Cardiology called for elevated troponin    Past Medical History:   Diagnosis Date    Arthritis     Cataract     Macular degeneration        Past Surgical History:   Procedure Laterality Date    CATARACT EXTRACTION  2003    LEFT EYE       Review of patient's allergies indicates:  No Known Allergies    No current facility-administered medications on file prior to encounter.      Current Outpatient Medications on File Prior to Encounter   Medication Sig    aspirin (ECOTRIN) 81 MG EC tablet Take 81 mg by mouth once daily.    BETA-CAROTENE,A, W-C & E/MIN (ICAPS ORAL) Take 1 tablet by mouth once daily.    fish oil-omega-3 fatty acids 300-1,000 mg capsule Take 2 g by mouth once daily.    multivitamin with minerals tablet Take 1 tablet by mouth once daily.    naproxen sodium (ANAPROX) 220 MG tablet Take 220 mg by mouth 2 (two) times daily with meals.     Family History     Problem Relation (Age of Onset)    Retinal detachment Sister    Thyroid disease Sister        Tobacco Use    Smoking status: Never Smoker     Smokeless tobacco: Never Used   Substance and Sexual Activity    Alcohol use: No    Drug use: Not on file    Sexual activity: Not on file     Review of Systems   Constitution: Positive for malaise/fatigue. Negative for chills and fever.   Cardiovascular: Negative for chest pain, dyspnea on exertion and orthopnea.   Respiratory: Negative for cough and shortness of breath.    Gastrointestinal: Negative for abdominal pain, constipation, diarrhea and melena.   Genitourinary: Negative for dysuria and hematuria.     Objective:     Vital Signs (Most Recent):  Temp: 98.2 °F (36.8 °C) (09/05/18 1410)  Pulse: 73 (09/05/18 1639)  Resp: 18 (09/05/18 1426)  BP: (!) 200/84 (09/05/18 1639)  SpO2: 96 % (09/05/18 1639) Vital Signs (24h Range):  Temp:  [98.2 °F (36.8 °C)] 98.2 °F (36.8 °C)  Pulse:  [66-74] 73  Resp:  [18] 18  SpO2:  [93 %-96 %] 96 %  BP: (162-221)/(71-88) 200/84     Weight: 78.2 kg (172 lb 6.4 oz)  Body mass index is 37.31 kg/m².    SpO2: 96 %       No intake or output data in the 24 hours ending 09/05/18 1816    Lines/Drains/Airways     Peripheral Intravenous Line                 Peripheral IV - Single Lumen 09/05/18 1424 Right Antecubital less than 1 day         Peripheral IV - Single Lumen 09/05/18 1445 Left Antecubital less than 1 day                Physical Exam  Gen: no acute distress, disoriented but pleasant  Neck: no JVD, no carotid bruits  CV: regular rate and rhythm, normal S1/2, no murmurs, rubs, gallops  Resp: clear to auscultation bilaterally, normal effort  GI: soft, nontender nondistended, normal bowel sounds  Ext: warm well perfused, no edema, no clubbing or cyanosis    Significant Labs:   All pertinent lab results from the last 24 hours have been reviewed. and   Recent Lab Results       09/05/18  1720 09/05/18  1442      Immature Granulocytes  0.3     Immature Grans (Abs)  0.02  Comment:  Mild elevation in immature granulocytes is non specific and   can be seen in a variety of conditions  including stress response,   acute inflammation, trauma and pregnancy. Correlation with other   laboratory and clinical findings is essential.       Albumin  3.0     Alkaline Phosphatase  146     ALT  21     Anion Gap  9     Appearance, UA Hazy      aPTT  33.6  Comment:  aPTT therapeutic range = 39-69 seconds     AST  57     Bacteria, UA None      Baso #  0.07     Basophil%  0.9     Bilirubin (UA) Negative      Total Bilirubin  0.8  Comment:  For infants and newborns, interpretation of results should be based  on gestational age, weight and in agreement with clinical  observations.  Premature Infant recommended reference ranges:  Up to 24 hours.............<8.0 mg/dL  Up to 48 hours............<12.0 mg/dL  3-5 days..................<15.0 mg/dL  6-29 days.................<15.0 mg/dL       BNP  482  Comment:  Values of less than 100 pg/ml are consistent with non-CHF populations.     BUN, Bld  23     Calcium  9.5     Chloride  106     CO2  25     Color, UA Yellow      Creatinine  1.5     Differential Method  Automated     eGFR if African American  36.4     eGFR if non   31.5  Comment:  Calculation used to obtain the estimated glomerular filtration  rate (eGFR) is the CKD-EPI equation.        Eos #  0.5     Eosinophil%  6.7     Glucose  107     Glucose, UA Negative      Gran # (ANC)  4.4     Gran%  58.1     Hematocrit  40.9     Hemoglobin  13.4     Hyaline Casts, UA 10      Coumadin Monitoring INR  3.6  Comment:  Coumadin Therapy:  2.0 - 3.0 for INR for all indicators except mechanical heart valves  and antiphospholipid syndromes which should use 2.5 - 3.5.       Ketones, UA Negative      Leukocytes, UA Trace      Lymph #  1.4     Lymph%  18.8     MCH  30.2     MCHC  32.8     MCV  92     Microscopic Comment SEE COMMENT  Comment:  Other formed elements not mentioned in the report are not   present in the microscopic examination.         Mono #  1.1     Mono%  15.2     MPV  11.7     Nitrite, UA Negative       nRBC  0     Occult Blood UA Negative      pH, UA 6.0      Platelets  199     Potassium  5.8  Comment:  *No Visible Hemolysis     Total Protein  8.0     Protein, UA 1+  Comment:  Recommend a 24 hour urine protein or a urine   protein/creatinine ratio if globulin induced proteinuria is  clinically suspected.        Protime  34.9     RBC  4.44     RBC, UA 5      RDW  17.2     Sodium  140     Specific Gravity, UA 1.020      Specimen UA Urine, Clean Catch      Squam Epithel, UA 17      Troponin I  0.899  Comment:  The reference interval for Troponin I represents the 99th percentile   cutoff   for our facility and is consistent with 3rd generation assay   performance.       Urobilinogen, UA 4.0      WBC, UA 7      WBC  7.49           Significant Imaging: EKG: NSR, no ischemic changes    Assessment and Plan:     Elevated troponin    85 year old female with long-standing untreated HTN who presents today with slurred speech, fatigue and dysequilibrium. She has a normal EKG, no chest pain or symptoms concerning for ACS. Her troponin elevation is most likely related to demand ischemia from long-standing HTN. Likely has some CAD given her age and HTN but nothing ischemic to explain her abnormal labs.     --recommend IV labetalol x 1  --will likely need at least two antihypertensive agents; recommend amlodipine 10mg and lisinopril 20mg to start  --further workup of her AMS per primary team, MRI pending  --trend troponin for one more check 6 hours after original; if significantly elevated from current, alert cardiology fellow on call  --check 2d echo with CFD in AM            VTE Risk Mitigation (From admission, onward)    None          Thank you for your consult. I will follow-up with patient. Please contact us if you have any additional questions.    Vincent Guo MD  Cardiology   [facility]    I have personally taken the history and examined the patient and agree with the resident's note as stated  above.  Agree with BP treatment and CFD.

## 2018-09-05 NOTE — ED TRIAGE NOTES
"Pt presents with c/o fatigue, weakness. Pt denies pain. Pt reports some dizziness. Symptoms present for approx 2 days. Pt had "upset stomach" and headache on Sunday.   "

## 2018-09-05 NOTE — ED NOTES
".  Patient identifiers for Mitzy Perez 85 y.o. female checked and correct.  Chief Complaint   Patient presents with    Fatigue     patient states "I feel off" is having a hard time describing symptoms. Family reports stomach bug is going around family and they thought it was that but patient has been slightly confused and has difficulty finding words for 2 days now. Patient has a history of macular degeneration but has been complaining of blurry vision and some dizziness.      Past Medical History:   Diagnosis Date    Arthritis     Cataract     Macular degeneration      Allergies reported: Review of patient's allergies indicates:  No Known Allergies      LOC: Patient is awake, alert, and aware of environment with an appropriate affect. Patient is oriented x 3 and speaking appropriately.  APPEARANCE: Patient resting comfortably and in no acute distress. Patient is clean and well groomed, patient's clothing is properly fastened.  SKIN: The skin is warm and dry. Patient has normal skin turgor and moist mucus membranes. Skin is intact; no bruising or breakdown noted.  MUSKULOSKELETAL: Patient is moving all extremities well, no obvious deformities noted. Pulses intact. Pt has generalized weakness, pt had to be assisted by 2 out of w/c into bed.   RESPIRATORY: Airway is open and patent. Respirations are spontaneous and non-labored with normal effort and rate, BBS=clear  CARDIAC: Patient has a normal rate and rhythm. NSR cardiac monitor,No peripheral edema noted.   ABDOMEN: No distention noted. Bowel sounds active in all 4 quadrants. Soft and non-tender upon palpation. Pt reports some nausea on Sunday.   NEUROLOGICAL:  PERRL. Facial expression is symmetrical. Hand grasps are equal bilaterally. Normal sensation in all extremities when touched with finger. Pt reports blurry vision, pt has hx of macular degeneration. Family states yesterday she kept saying she saw 3 cats and they only have 2. Worsening in vision. " Family reports slurred speech. Pt family reports that pt has been confused and they are having difficulty having a conversation with her. Pt denies n/t or weakness in either extremity. Pt awake and alert x 3 at this time.

## 2018-09-05 NOTE — TELEPHONE ENCOUNTER
Reason for Disposition   Difficult to awaken or acting confused (e.g., disoriented, slurred speech)    Protocols used: [facility] NEUROLOGIC DEFICIT-A-[place]    Spoke to patient's granddaughter April. Patient is not present with caller. April states Ms. Perez has been experiencing confusion and slurred speech. Advised  April to call 911 for patient.

## 2018-09-05 NOTE — SUBJECTIVE & OBJECTIVE
Past Medical History:   Diagnosis Date    Arthritis     Cataract     Macular degeneration        Past Surgical History:   Procedure Laterality Date    CATARACT EXTRACTION  2003    LEFT EYE       Review of patient's allergies indicates:  No Known Allergies    No current facility-administered medications on file prior to encounter.      Current Outpatient Medications on File Prior to Encounter   Medication Sig    aspirin (ECOTRIN) 81 MG EC tablet Take 81 mg by mouth once daily.    BETA-CAROTENE,A, W-C & E/MIN (ICAPS ORAL) Take 1 tablet by mouth once daily.    fish oil-omega-3 fatty acids 300-1,000 mg capsule Take 2 g by mouth once daily.    multivitamin with minerals tablet Take 1 tablet by mouth once daily.    naproxen sodium (ANAPROX) 220 MG tablet Take 220 mg by mouth 2 (two) times daily with meals.     Family History     Problem Relation (Age of Onset)    Retinal detachment Sister    Thyroid disease Sister        Tobacco Use    Smoking status: Never Smoker    Smokeless tobacco: Never Used   Substance and Sexual Activity    Alcohol use: No    Drug use: Not on file    Sexual activity: Not on file     Review of Systems   Constitution: Positive for malaise/fatigue. Negative for chills and fever.   Cardiovascular: Negative for chest pain, dyspnea on exertion and orthopnea.   Respiratory: Negative for cough and shortness of breath.    Gastrointestinal: Negative for abdominal pain, constipation, diarrhea and melena.   Genitourinary: Negative for dysuria and hematuria.     Objective:     Vital Signs (Most Recent):  Temp: 98.2 °F (36.8 °C) (09/05/18 1410)  Pulse: 73 (09/05/18 1639)  Resp: 18 (09/05/18 1426)  BP: (!) 200/84 (09/05/18 1639)  SpO2: 96 % (09/05/18 1639) Vital Signs (24h Range):  Temp:  [98.2 °F (36.8 °C)] 98.2 °F (36.8 °C)  Pulse:  [66-74] 73  Resp:  [18] 18  SpO2:  [93 %-96 %] 96 %  BP: (162-221)/(71-88) 200/84     Weight: 78.2 kg (172 lb 6.4 oz)  Body mass index is 37.31 kg/m².    SpO2: 96 %        No intake or output data in the 24 hours ending 09/05/18 1816    Lines/Drains/Airways     Peripheral Intravenous Line                 Peripheral IV - Single Lumen 09/05/18 1424 Right Antecubital less than 1 day         Peripheral IV - Single Lumen 09/05/18 1445 Left Antecubital less than 1 day                Physical Exam  Gen: no acute distress, disoriented but pleasant  Neck: no JVD, no carotid bruits  CV: regular rate and rhythm, normal S1/2, no murmurs, rubs, gallops  Resp: clear to auscultation bilaterally, normal effort  GI: soft, nontender nondistended, normal bowel sounds  Ext: warm well perfused, no edema, no clubbing or cyanosis    Significant Labs:   All pertinent lab results from the last 24 hours have been reviewed. and   Recent Lab Results       09/05/18  1720 09/05/18  1442      Immature Granulocytes  0.3     Immature Grans (Abs)  0.02  Comment:  Mild elevation in immature granulocytes is non specific and   can be seen in a variety of conditions including stress response,   acute inflammation, trauma and pregnancy. Correlation with other   laboratory and clinical findings is essential.       Albumin  3.0     Alkaline Phosphatase  146     ALT  21     Anion Gap  9     Appearance, UA Hazy      aPTT  33.6  Comment:  aPTT therapeutic range = 39-69 seconds     AST  57     Bacteria, UA None      Baso #  0.07     Basophil%  0.9     Bilirubin (UA) Negative      Total Bilirubin  0.8  Comment:  For infants and newborns, interpretation of results should be based  on gestational age, weight and in agreement with clinical  observations.  Premature Infant recommended reference ranges:  Up to 24 hours.............<8.0 mg/dL  Up to 48 hours............<12.0 mg/dL  3-5 days..................<15.0 mg/dL  6-29 days.................<15.0 mg/dL       BNP  482  Comment:  Values of less than 100 pg/ml are consistent with non-CHF populations.     BUN, Bld  23     Calcium  9.5     Chloride  106     CO2  25     Color, UA  Yellow      Creatinine  1.5     Differential Method  Automated     eGFR if African American  36.4     eGFR if non   31.5  Comment:  Calculation used to obtain the estimated glomerular filtration  rate (eGFR) is the CKD-EPI equation.        Eos #  0.5     Eosinophil%  6.7     Glucose  107     Glucose, UA Negative      Gran # (ANC)  4.4     Gran%  58.1     Hematocrit  40.9     Hemoglobin  13.4     Hyaline Casts, UA 10      Coumadin Monitoring INR  3.6  Comment:  Coumadin Therapy:  2.0 - 3.0 for INR for all indicators except mechanical heart valves  and antiphospholipid syndromes which should use 2.5 - 3.5.       Ketones, UA Negative      Leukocytes, UA Trace      Lymph #  1.4     Lymph%  18.8     MCH  30.2     MCHC  32.8     MCV  92     Microscopic Comment SEE COMMENT  Comment:  Other formed elements not mentioned in the report are not   present in the microscopic examination.         Mono #  1.1     Mono%  15.2     MPV  11.7     Nitrite, UA Negative      nRBC  0     Occult Blood UA Negative      pH, UA 6.0      Platelets  199     Potassium  5.8  Comment:  *No Visible Hemolysis     Total Protein  8.0     Protein, UA 1+  Comment:  Recommend a 24 hour urine protein or a urine   protein/creatinine ratio if globulin induced proteinuria is  clinically suspected.        Protime  34.9     RBC  4.44     RBC, UA 5      RDW  17.2     Sodium  140     Specific Gravity, UA 1.020      Specimen UA Urine, Clean Catch      Squam Epithel, UA 17      Troponin I  0.899  Comment:  The reference interval for Troponin I represents the 99th percentile   cutoff   for our facility and is consistent with 3rd generation assay   performance.       Urobilinogen, UA 4.0      WBC, UA 7      WBC  7.49           Significant Imaging: EKG: NSR, no ischemic changes

## 2018-09-06 PROBLEM — I65.21 ASYMPTOMATIC STENOSIS OF RIGHT CAROTID ARTERY: Status: ACTIVE | Noted: 2018-01-01

## 2018-09-06 NOTE — HPI
85 year old female with past medical hx of osteoarthritis and macular degeneration presented to the ED today after complaints of light headedness and decrease in vision. Her daughter is at the bedside and states patient has baseline vision loss from macular degeneration but is able to see peripherally. She noticed over the past few days her mom has been slower to speak and her speech had become more slurred. Patient states since yesterday she feels like she is leaning towards one side when she walks. Patient performs ADLS independently and at baseline does not need equipment assistance to walk. She lives at home with her daughter. She denies any unilateral weakness or facial droop. MRI was completed in the ED revealing multifocal punctate regions of restricted diffusion. Will admit to Vascular Neurology for stroke work up.

## 2018-09-06 NOTE — PROGRESS NOTES
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 Her daughter is at the bedside and states patient has baseline vision loss from macular degeneration but is able to see peripherally. She noticed over the past few days her mom has been slower to speak and her speech had become more slurred. Patient states since yesterday she feels like she is leaning towards one side when she walks. She denies any unilateral weakness or facial droop. MRI was completed in the ED revealing multifocal punctate regions of restricted diffusion. Will admit to Vascular Neurology for stroke work up.     On exam patient moves all extremities 5/5. Hard to assess visual fields due to vision loss from macular degeneration. Patient stating her vision is not blurrier then normal but daughter stating it is. Patient Oriented X 3 but slow respond. Dysarthria is present on exam.    Antithrombotics for secondary stroke prevention: Antiplatelets: Aspirin: 81 mg daily    Statins for secondary stroke prevention and hyperlipidemia, if present:   Statins: Atorvastatin- 40 mg daily - , mild elevation of LFTs, continue to monitor     Aggressive risk factor modification: HTN, HLD, Diet, Obesity     Rehab efforts: PT/OT/SLP to evaluate and treat     Diagnostics ordered/pending:echo     VTE prophylaxis: INR normalized, may have been lab error - now 1.1. Hep vte and scds     BP parameters: Infarct: No intervention, SBP <220            Asymptomatic stenosis of right carotid artery    50% stenosis on CTA, not related to cause of stroke   Outpatient monitoring         Elevated INR    INR 3.6 on Admission  Unclear why INR is elevated, now normalized    Potential lab error?  Patient and daughter denies use of AC        Mixed hyperlipidemia    Stroke risk factor    Atorvastatin 40 mg   Mildly elevated LFTs continue to monitor         Essential hypertension    Stroke risk factor   SBP < 220   Restart amlodipine and lisinopril   24 hour range 128-227/60-88        Elevated troponin    Cardiology consulted in ED - see recs   - troponin for one more check 6 hours after original; if significantly elevated from current, alert cardiology fellow on call  Appreciate cardiology recs              85 year old female with slurred speech, and worsening of baseline vision (history of macular degeneration) - MRI with scattered punctate areas of restricted diffusion, echo pending today.     9/6/18 - patient reports no complaints today - pending echo, pending rehab recommendations     STROKE DOCUMENTATION        NIH Scale:  1a. Level Of Consciousness: 0-->Alert: keenly responsive  1b. LOC Questions: 0-->Answers both questions correctly  1c. LOC Commands: 0-->Performs both tasks correctly  2. Best Gaze: 0-->Normal  3. Visual: 1-->Partial hemianopia  4. Facial Palsy: 0-->Normal symmetrical movements  5a. Motor Arm, Left: 0-->No drift: limb holds 90 (or 45) degrees for full 10 secs  5b. Motor Arm, Right: 0-->No drift: limb holds 90 (or 45) degrees for full 10 secs  6a. Motor Leg, Left: 0-->No drift: leg holds 30 degree position for full 5 secs  6b. Motor Leg, Right: 0-->No drift: leg holds 30 degree position for full 5 secs  7. Limb Ataxia: 0-->Absent  8. Sensory: 0-->Normal: no sensory loss  9. Best Language: 0-->No aphasia: normal  10. Dysarthria: 1-->Mild-to-moderate dysarthria: patient slurs at least some words and, at worst, can be understood with some difficulty  11. Extinction and Inattention (formerly Neglect): 0-->No abnormality  Total (NIH Stroke Scale): 2       Modified Desire Score: 0  Kunia Coma Scale:    ABCD2 Score:    [id number] Score:   HAS -BLED Score:    ICH Score:   Hunt & Pandya Classification:      Hemorrhagic change of an Ischemic Stroke: Does this patient have an ischemic stroke with hemorrhagic changes? No     Neurologic Chief Complaint: multifocal punctate strokes     Subjective:     Interval History: Patient is seen for follow-up neurological assessment and treatment recommendations: no events overnight   Elevated troponin, denies cp, palpitation, sob - note from cards in chart  Elevated INR (3.6) on presentation but on repeat 1.1     Denies complaints today     HPI, Past Medical, Family, and Social History remains the same as documented in the initial encounter.     Review of Systems   Constitutional: Negative for fever.   Eyes: Positive for visual disturbance.   Respiratory: Negative for shortness of breath and stridor.    Cardiovascular: Negative for chest pain, palpitations and leg swelling.   Neurological: Positive for speech difficulty. Negative for weakness.     Scheduled Meds:   aspirin  81 mg Oral QHS    atorvastatin  40 mg Oral Daily    sodium chloride 0.9%  3 mL Intravenous Q8H     Continuous Infusions:   sodium chloride 0.9% 75 mL/hr at 09/05/18 1945    sodium chloride 0.9%       PRN Meds:acetaminophen, labetalol, ondansetron, sodium chloride 0.9%    Objective:     Vital Signs (Most Recent):  Temp: 98.6 °F (37 °C) (09/06/18 0422)  Pulse: 70 (09/06/18 0700)  Resp: 20 (09/06/18 0422)  BP: (!) 186/78 (09/06/18 0422)  SpO2: (!) 92 % (09/06/18 0422)  BP Location: Right arm    Vital Signs Range (Last 24H):  Temp:  [98.2 °F (36.8 °C)-98.6 °F (37 °C)]   Pulse:  [62-78]   Resp:  [18-20]   BP: (162-227)/(71-88)   SpO2:  [92 %-98 %]   BP Location: Right arm    Physical Exam   Constitutional: She is oriented to person, place, and time. She appears well-developed and well-nourished.   HENT:   Head: Normocephalic and atraumatic.   Cardiovascular: Normal rate.   Pulmonary/Chest: Effort normal.   Musculoskeletal: Normal range of motion. She exhibits no  edema.   Neurological: She is alert and oriented to person, place, and time.   Nursing note and vitals reviewed.      Neurological Exam:   LOC: alert  Attention Span: Good   Language: No aphasia  Articulation: Dysarthria  Orientation: Person, Place, Time   Visual Fields: vision deficit present due to macular degeneration, slightly worse today than her baseline  EOM (CN III, IV, VI): Full/intact  Motor: Arm left  Normal 5/5  Leg left  Normal 5/5  Arm right  Normal 5/5  Leg right Normal 5/5  Sensation: Intact to light touch, temperature and vibration    Laboratory:  CMP:   Recent Labs   Lab  09/06/18   0452   CALCIUM  8.9   ALBUMIN  2.7*   PROT  6.9   NA  141   K  4.2   CO2  24   CL  110   BUN  22   CREATININE  1.3   ALKPHOS  136*   ALT  20   AST  51*   BILITOT  0.7     CBC:   Recent Labs   Lab  09/06/18   0452   WBC  6.89   RBC  3.83*   HGB  11.6*   HCT  34.8*   PLT  162   MCV  91   MCH  30.3   MCHC  33.3     Lipid Panel:   Recent Labs   Lab  09/05/18   1442   CHOL  164   LDLCALC  100.6   HDL  37*   TRIG  132     Coagulation:   Recent Labs   Lab  09/06/18   0452   INR  1.1   APTT  24.0     Platelet Aggregation Study: No results for input(s): PLTAGG, PLTAGINTERP, PLTAGREGLACO, ADPPLTAGGREG in the last 168 hours.  Hgb A1C: No results for input(s): HGBA1C in the last 168 hours.  TSH:   Recent Labs   Lab  09/05/18   1442   TSH  4.290*       Diagnostic Results     Brain Imaging   9/5/18 - MRI   1. Multifocal punctate regions of restricted diffusion involving the left basal ganglia, left parietal region, left posterior corpus callosum, bilateral occipital lobes, and scattered within the cerebellum, concerning for multifocal punctate infarcts, likely of embolic source.  Is a focus of hemorrhage hemorrhage/hemosiderin deposition within the left basal ganglia, likely with associated infarct.  2. Sequela of chronic microvascular ischemic change noting remote appearing infarcts involving the cerebellum and likely cerebellar  vermis.  3. Small amount of fluid within the left mastoid air cells.    9/5/18 - CTA   CT brain:    Previously identified areas of scattered acute punctate infarction present however not as well characterized as when compared with same-date MRI.  No new intraparenchymal hemorrhage or major vascular distribution infarct.    Mild generalized cerebral volume loss and chronic microvascular ischemic change.    CTA head/neck:    Significant mixed calcific/noncalcific atherosclerotic change at bilateral carotid bifurcations with approximately 50% stenosis of the proximal right internal carotid artery per NASCET criteria.    Bilateral vertebral artery origins demonstrate mild narrowing, left greater than right, with the left vertebral origin demonstrating significant tortuosity.  Mild atherosclerotic change of the left V4 with minimal associated narrowing.    No significant focal stenosis, focal occlusion, or intracranial aneurysm formation of the intracranial vasculature.    Advanced degenerative changes of the cervical spine at the C5-C6 level.    Additional details above.    Echo pending       MAGALY Savage  Com[facility] St[facility] Center  Department of Vascular Neurology   [facility]

## 2018-09-06 NOTE — PT/OT/SLP EVAL
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    Recommendations:                  General Recommendations:  Speech/language therapy  Diet recommendations:  Regular, Thin   Aspiration Precautions: Standard aspiration precautions   General Precautions: Standard, vision impaired, fall  Communication strategies:  none    History:     Past Medical History:   Diagnosis Date    Arthritis     Cataract     Essential hypertension 9/5/2018    Macular degeneration        Past Surgical History:   Procedure Laterality Date    CATARACT EXTRACTION  2003    LEFT EYE       Social History: Patient lives with her daugther    Prior diet: regular/thin    Occupation/hobbies/homemaking: retired; 10th grade education; independent pta; not driving due to macular degeneration    Subjective     "my speech"    Patient goals: go home    Pain/Comfort:  · Pain Rating 1: 0/10  · Pain Rating Post-Intervention 1: 0/10    Objective:     Cognitive Status:    Pt awake/alert. Pt ox4. She recalled up to 5 digits and 4 words immediately. After a delay,she recalled 0/3 objects and with a cue 1/3. Responses to hypothetical problems were accurate but pt with difficulty generating multiple solutions.  She contrasted items but had difficulty comparing them. She completed categorizational task with 60% acc and with cues 80% acc. She named 11 items during word fluency when 15-20 is wnl.       Receptive Language:   Comprehension:   Pt responded to complex questions with 100% acc and followed 2 step commands.     Pragmatics:    functional    Expressive Language:  Verbal:    Pt able to express her wants/needs. Pt with occasional pauses to think of words which family reported has been happening over the past several months.       Motor Speech:  Mild dysarthria    Voice:   WFL    Visual-Spatial:  pt with poor " vision due to macular degneration difficult to test    Reading:   unable to test due to vision     Written Expression:   did not taste    Oral Musculature Evaluation  · Oral Musculature: WFL  · Dentition: present and adequate  · Mucosal Quality: good  · Oral Labial Strength and Mobility: WFL  · Lingual Strength and Mobility: WFL  · Buccal Strength and Mobility: WFL  · Volitional Cough: strong  · Voice Prior to PO Intake: clear    Bedside Swallow Eval:   Consistencies Assessed:  · Thin liquids cup sips  · Solids crackers     Oral Phase:   · WFL    Pharyngeal Phase:   · no overt clinical signs/symptoms of pharyngeal dysphagia    Compensatory Strategies  · None    Treatment: Pt's daughter reported pt with decreased speech and increase in memory loss and difficulty with word finding. Education provided re: role of slp and speech strategies. White board updated.     Assessment:     Mitzy Perez is a 85 y.o. female with an SLP diagnosis of Dysarthria and Cognitive-Linguistic Impairment.  She presents with mild dysarthria.    Goals:   Multidisciplinary Problems     SLP Goals        Problem: SLP Goal    Goal Priority Disciplines Outcome   SLP Goal     SLP    Description:  Goals to be met 9/15  1. Pt will recall 3/3 speech strategies   2 pt will complete dysarthria task with 90% intelligibility and min a  3 pt will complete mental manipulation task with 80% acc and min a  4 Pt will recall functional memory strategies with min a                     Plan:     · Patient to be seen:  4 x/week   · Plan of Care expires:     · Plan of Care reviewed with:  patient, family   · SLP Follow-Up:  Yes       Discharge recommendations:  Discharge Facility/Level Of Care Needs: rehabilitation facility       Time Tracking:     SLP Treatment Date:   09/06/18  Speech Start Time:  0828  Speech Stop Time:  0851     Speech Total Time (min):  23 min    Billable Minutes: Eval 15  and Eval Swallow and Oral Function 8    ROBERT Bhardwaj,  CCC-SLP  09/06/2018

## 2018-09-06 NOTE — PLAN OF CARE
Problem: SLP Goal  Goal: SLP Goal  Pt safe for regular diet and thin liquids.     ROBERT Bhardwaj, CCC-SLP  9/6/2018

## 2018-09-06 NOTE — PROGRESS NOTES
"[facility]  Cardiology  Progress Note    Patient Name: Mitzy Perez  MRN: [id number]  Admission Date: 9/5/2018  Hospital Length of Stay: 1 days  Code Status: Full Code   Attending Physician: Alvin Layne MD   Primary Care Physician: Primary Doctor No  Expected Discharge Date:   Principal Problem:Acute ischemic multifocal multiple vascular territories stroke    Subjective:     Hospital Course:   No notes on file    Interval History: Patient ambulating appropriately with PT using walker. Sitting at bedside during interview. No SOB. No CP. Waiting for 2D echo to be completed.     Review of Systems   Constitution: Negative for decreased appetite, fever and weakness.   HENT: Negative.    Eyes: Positive for blurred vision and double vision. Negative for photophobia, redness and visual halos.        Can only see out of periphery, stable as per patient     Cardiovascular: Negative for chest pain, claudication, dyspnea on exertion, irregular heartbeat, leg swelling, near-syncope, orthopnea and syncope.   Respiratory: Negative for shortness of breath and sleep disturbances due to breathing.    Hematologic/Lymphatic: Negative.    Skin: Positive for dry skin (x4 limbs).        Dry appealing    Gastrointestinal: Negative.    Genitourinary: Negative for flank pain.   Neurological:        Baseline dementia, some hallucinations "thought my ID badge was a box of cigarettes"    Psychiatric/Behavioral: Positive for depression, hallucinations and memory loss.   Allergic/Immunologic: Negative.      Objective:     Vital Signs (Most Recent):  Temp: 98.6 °F (37 °C) (09/06/18 0422)  Pulse: 70 (09/06/18 0700)  Resp: 20 (09/06/18 0422)  BP: (!) 186/78 (09/06/18 0422)  SpO2: (!) 92 % (09/06/18 0422) Vital Signs (24h Range):  Temp:  [98.2 °F (36.8 °C)-98.6 °F (37 °C)] 98.6 °F (37 °C)  Pulse:  [62-78] 70  Resp:  [18-20] 20  SpO2:  [92 %-98 %] 92 %  BP: (162-227)/(71-88) 186/78     Weight: 78.2 kg (172 lb 6.4 oz)  Body " mass index is 37.31 kg/m².     SpO2: (!) 92 %  O2 Device (Oxygen Therapy): room air    No intake or output data in the 24 hours ending 09/06/18 0903    Lines/Drains/Airways     Peripheral Intravenous Line                 Peripheral IV - Single Lumen 09/05/18 1445 Left Antecubital less than 1 day                Physical Exam   Constitutional: She is oriented to person, place, and time. She appears well-developed and well-nourished. No distress.   Truncal obesity noted     HENT:   Head: Normocephalic and atraumatic.   Mouth/Throat: No oropharyngeal exudate.   Eyes: No scleral icterus.   Neck: Normal range of motion. Neck supple. No JVD present.   Pulmonary/Chest: Effort normal and breath sounds normal. No respiratory distress. She has no wheezes.   Abdominal: Soft. Bowel sounds are normal. There is no tenderness.   Musculoskeletal: Normal range of motion. She exhibits no edema.   Neurological: She is alert and oriented to person, place, and time.   Skin: Skin is warm and dry. She is not diaphoretic.   Psychiatric:   Baseline dementia    Vitals reviewed.      Significant Labs:   Troponin   Recent Labs   Lab  09/05/18   1442  09/05/18   1815  09/06/18   0452   TROPONINI  0.899*  0.888*  1.357*   , All pertinent lab results from the last 24 hours have been reviewed. and   Recent Lab Results       09/06/18  0648 09/06/18  0452 09/05/18  1815 09/05/18  1720 09/05/18  1442      Immature Granulocytes  0.1   0.3     Immature Grans (Abs)  0.01  Comment:  Mild elevation in immature granulocytes is non specific and   can be seen in a variety of conditions including stress response,   acute inflammation, trauma and pregnancy. Correlation with other   laboratory and clinical findings is essential.     0.02  Comment:  Mild elevation in immature granulocytes is non specific and   can be seen in a variety of conditions including stress response,   acute inflammation, trauma and pregnancy. Correlation with other   laboratory and  clinical findings is essential.       Albumin  2.7   3.0     Alkaline Phosphatase  136   146     ALT  20   21     Anion Gap  7   9     Appearance, UA Clear   Hazy      aPTT  24.0  Comment:  aPTT therapeutic range = 39-69 seconds   33.6  Comment:  aPTT therapeutic range = 39-69 seconds     AST  51   57     Bacteria, UA    None      Baso #  0.06   0.07     Basophil%  0.9   0.9     Bilirubin (UA) Negative   Negative      Total Bilirubin  0.7  Comment:  For infants and newborns, interpretation of results should be based  on gestational age, weight and in agreement with clinical  observations.  Premature Infant recommended reference ranges:  Up to 24 hours.............<8.0 mg/dL  Up to 48 hours............<12.0 mg/dL  3-5 days..................<15.0 mg/dL  6-29 days.................<15.0 mg/dL     0.8  Comment:  For infants and newborns, interpretation of results should be based  on gestational age, weight and in agreement with clinical  observations.  Premature Infant recommended reference ranges:  Up to 24 hours.............<8.0 mg/dL  Up to 48 hours............<12.0 mg/dL  3-5 days..................<15.0 mg/dL  6-29 days.................<15.0 mg/dL       BNP     482  Comment:  Values of less than 100 pg/ml are consistent with non-CHF populations.     BUN, Bld  22   23     Calcium  8.9   9.5     Chloride  110   106     Cholesterol     164  Comment:  The National Cholesterol Education Program (NCEP) has set the  following guidelines (reference ranges) for Cholesterol:  Optimal.....................<200 mg/dL  Borderline High.............200-239 mg/dL  High........................> or = 240 mg/dL       CO2  24   25     Color, UA Yellow   Yellow      CPK  143        CPK MB  4.6        Creatinine  1.3   1.5     Differential Method  Automated   Automated     eGFR if   43.2   36.4     eGFR if non   37.5  Comment:  Calculation used to obtain the estimated glomerular filtration  rate (eGFR) is  "the CKD-EPI equation.      31.5  Comment:  Calculation used to obtain the estimated glomerular filtration  rate (eGFR) is the CKD-EPI equation.        Eos #  0.4   0.5     Eosinophil%  6.0   6.7     Free T4     0.70     Glucose  81   107     Glucose, UA Negative   Negative      Gran # (ANC)  4.0   4.4     Gran%  57.4   58.1     HDL     37  Comment:  The National Cholesterol Education Program (NCEP) has set the  following guidelines (reference values) for HDL Cholesterol:  Low...............<40 mg/dL  Optimal...........>60 mg/dL       HDL/Chol Ratio     22.6     Hematocrit  34.8   40.9     Hemoglobin  11.6   13.4     Hyaline Casts, UA    10      Coumadin Monitoring INR  1.1  Comment:  Coumadin Therapy:  2.0 - 3.0 for INR for all indicators except mechanical heart valves  and antiphospholipid syndromes which should use 2.5 - 3.5.     3.6  Comment:  Coumadin Therapy:  2.0 - 3.0 for INR for all indicators except mechanical heart valves  and antiphospholipid syndromes which should use 2.5 - 3.5.       Ketones, UA Negative   Negative      LDL Cholesterol     100.6  Comment:  The National Cholesterol Education Program (NCEP) has set the  following guidelines (reference values) for LDL Cholesterol:  Optimal.......................<130 mg/dL  Borderline High...............130-159 mg/dL  High..........................160-189 mg/dL  Very High.....................>190 mg/dL       Leukocytes, UA Negative   Trace      Lymph #  1.2   1.4     Lymph%  17.6   18.8     Magnesium  1.9        MB%  3.2  Comment:  To be positive, the MB% must be greater than 5% AND the CK-MB  greater than 6.5 ng/mL. Values not in the reference interval,   but not qualifying as positive, should be considered "trace".          MCH  30.3   30.2     MCHC  33.3   32.8     MCV  91   92     Microscopic Comment    SEE COMMENT  Comment:  Other formed elements not mentioned in the report are not   present in the microscopic examination.         Mono #  1.2   1.1     " Mono%  18.0   15.2     MPV  11.5   11.7     Nitrite, UA Negative   Negative      Non-HDL Cholesterol     127  Comment:  Risk category and Non-HDL cholesterol goals:  Coronary heart disease (CHD)or equivalent (10-year risk of CHD >20%):  Non-HDL cholesterol goal     <130 mg/dL  Two or more CHD risk factors and 10-year risk of CHD <= 20%:  Non-HDL cholesterol goal     <160 mg/dL  0 to 1 CHD risk factor:  Non-HDL cholesterol goal     <190 mg/dL       nRBC  0   0     Occult Blood UA Negative   Negative      pH, UA 6.0   6.0      Phosphorus  3.5        Platelets  162   199     Potassium  4.2   5.8  Comment:  *No Visible Hemolysis     Total Protein  6.9   8.0     Protein, UA Negative  Comment:  Recommend a 24 hour urine protein or a urine   protein/creatinine ratio if globulin induced proteinuria is  clinically suspected.     1+  Comment:  Recommend a 24 hour urine protein or a urine   protein/creatinine ratio if globulin induced proteinuria is  clinically suspected.        Protime  11.5   34.9     RBC  3.83   4.44     RBC, UA    5      RDW  17.2   17.2     Sodium  141   140     Specific Gravity, UA >1.030   1.020      Specimen UA Urine, Clean Catch   Urine, Clean Catch      Squam Epithel, UA    17      Total Cholesterol/HDL Ratio     4.4     Triglycerides     132  Comment:  The National Cholesterol Education Program (NCEP) has set the  following guidelines (reference values) for triglycerides:  Normal......................<150 mg/dL  Borderline High.............150-199 mg/dL  High........................200-499 mg/dL       Troponin I  1.357  Comment:  The reference interval for Troponin I represents the 99th percentile   cutoff   for our facility and is consistent with 3rd generation assay   performance.   0.888  Comment:  The reference interval for Troponin I represents the 99th percentile   cutoff   for our facility and is consistent with 3rd generation assay   performance.    0.899  Comment:  The reference interval  for Troponin I represents the 99th percentile   cutoff   for our facility and is consistent with 3rd generation assay   performance.       TSH     4.290     Urobilinogen, UA 4.0-6.0   4.0      WBC, UA    7      WBC  6.89   7.49                     Significant Imaging: reviewed , 2D Echo pending    Assessment and Plan:     Brief HPI: 85 year old with history of untreated HTN, macular degeneration who presents this evening with her family because of some altered mental status, double vision and visual hallucinations that have worsened over the past day. She has had no chest pain, ERIC, SOB, palpitations. Her SBP has remained 194/80- 227/88. Cardiology called for elevation in troponin 0.0899--0.888--1.375.    Patient remains ambulatory, is not SOB, denies ever having chest pressure or pain. Patient does not smoke cigarettes, denies EtOH, or illicit substances. Patient lives at home with her daughter and other relatives.     Essential hypertension    -Continue to recommend starting Amlodipine 10mg QD and Lisinopril 20mg QD  -f/u with PCP in resident clinic or priority if able to        Elevated troponin    85 year old female with long-standing untreated HTN who presents today with slurred speech, fatigue and dysequilibrium. She has a normal EKG, no chest pain or symptoms concerning for ACS. Her troponin elevation is most likely related to demand ischemia from long-standing HTN. Likely has some CAD given her age and HTN but nothing ischemic to explain her abnormal labs.     --recommend IV labetalol x 1  --will likly need at least two antihypertensive agents; recommend amlodipine 5mg and lisinopril 20mg to start  -SBP goal >160mmHg for time being  --further workup of her AMS per primary team, MRI showing no significantly new findings  --will suggest discontinuation of trending troponin at this time    --2D Echo conclusion:      1 - Hyperdynamic left ventricular systolic function (EF >70%).     2 - Normal right ventricular  systolic function .     3 - Severe left atrial enlargement.     4 - Mild aortic stenosis, CROW = 1.71 cm2, AVAi = 1.01 cm2/m2, peak velocity = 2.9 m/s, mean gradient = 15 mmHg.          VTE Risk Mitigation (From admission, onward)        Ordered     Place sequential compression device  Until discontinued      09/05/18 1916     IP VTE HIGH RISK PATIENT  Once      09/05/18 1916          Plan discussed with attending Dr. Mendez, further recommendations as per attending addendum. Please feel free to call with any questions or concerns.    We will sign off at this time.          Marta Michael MD  Cardiology  [facility]  I have personally taken the history and examined the patient and agree with the resident's note as stated above.  Needs vigorous BP treatment as above. Although BB not first line , low dose carvedilol 6.25 mg twice could be added too if needed with sugh high LVEF.If she gets edema on amlodipne ( would start at just 5 mg and less edema if given at nite) , diltiazem 180, 240, or 360 mg XR could be used instead. We can sign off , but call if other advice needed.

## 2018-09-06 NOTE — PLAN OF CARE
CM met with patient's daughter Radha at bedside in reference to discharge planning. Patient is currently in a test. Patient lives with daughter in a single story home with no steps to get into home. Prior to admission, pt was independent with ADLs. Her daughter states she is able to help her with any needs at discharge. Pt has the below DME however, she does not use on a regular basis. Awaiting PT/OT recs. CM will continue to follow.    PCP: Pt does not currently have a PCP however, daughter states that she would like patient to see Dr Marta Michael with [facility].   Pharmacy:   [id number] - [place], [place] - [address]  [id number]  [place] [address]  Phone: [phone] Fax: [phone]    [facility] Packet given and explained to paient, verbalized understanding.       09/06/18 1134   Discharge Assessment   Assessment Type Discharge Planning Assessment   Confirmed/corrected address and phone number on facesheet? Yes   Assessment information obtained from? Caregiver  (daughter Radha at bedside)   Expected Length of Stay (days) (TBD)   Communicated expected length of stay with patient/caregiver yes   Prior to hospitilization cognitive status: Alert/Oriented;No Deficits   Prior to hospitalization functional status: Independent   Current cognitive status: Alert/Oriented;No Deficits   Current Functional Status: Independent   Facility Arrived From: N/A   Lives With child(paty), adult;grandchild(paty)   Able to Return to Prior Arrangements unable to determine at this time (comments)   Is patient able to care for self after discharge? Unable to determine at this time (comments)   Who are your caregiver(s) and their phone number(s)? Radha Rutledge (Daughter) [phone]   Patient's perception of discharge disposition home or selfcare   Readmission Within The Last 30 Days no previous admission in last 30 days   Patient currently being followed by outpatient case management? Unable to determine  (comments)   Patient currently receives any other outside agency services? No   Equipment Currently Used at Home walker, rolling;wheelchair  (pt has equipment but does not use regularly)   Do you have any problems affording any of your prescribed medications? No   Is the patient taking medications as prescribed? (Pt not on any RX meds at home)   Does the patient have transportation home? Yes   Transportation Available car;family or friend will provide   Does the patient receive services at the [facility]? No   Discharge Plan A Home with family  (pending PT/OT recs)   Discharge Plan B Home Health   Patient/Family In Agreement With Plan yes

## 2018-09-06 NOTE — HOSPITAL COURSE
9/6/18: 9/6/18: Evaluated by PT & OT. Bed mobility modA- CGA. Sit to stand foreign-HHA. Ambulated 42 ft Foreign w/ RW. UBD maxA. LBDmodA. SLP recommendation: regular diet and thin liquids. SLP diagnosis mild dysarthria and CLI.

## 2018-09-06 NOTE — ASSESSMENT & PLAN NOTE
Cardiology consulted in ED - see recs   - troponin for one more check 6 hours after original; if significantly elevated from current, alert cardiology fellow on call

## 2018-09-06 NOTE — HOSPITAL COURSE
Ms. Mitzy Perez was admitted to Vascular Neurology for acute stroke workup. Stroke etiology suspected to be embolism at this time. Pt with enlarged LA on echo; ordered 30-day cardiac event monitor. Cardiology was consulted this admission for elevated troponins; ambulatory referral for follow-up placed at discharge.    Patient discharged with recommendations for admission to inpatient rehab, however family opting for home health with 24-hr supervision at this time. Family at bedside amenable to plan.     Patient with improvement in stroke symptoms since admission. Inpatient acute stroke work up completed and patient stable for discharge. Please see all appropriate medication changes, imaging results, and necessary follow-up below.

## 2018-09-06 NOTE — SUBJECTIVE & OBJECTIVE
"Interval History: Patient ambulating appropriately with PT using walker. Sitting at bedside during interview. No SOB. No CP. Waiting for 2D echo to be completed.     Review of Systems   Constitution: Negative for decreased appetite, fever and weakness.   HENT: Negative.    Eyes: Positive for blurred vision and double vision. Negative for photophobia, redness and visual halos.        Can only see out of periphery, stable as per patient     Cardiovascular: Negative for chest pain, claudication, dyspnea on exertion, irregular heartbeat, leg swelling, near-syncope, orthopnea and syncope.   Respiratory: Negative for shortness of breath and sleep disturbances due to breathing.    Hematologic/Lymphatic: Negative.    Skin: Positive for dry skin (x4 limbs).        Dry appealing    Gastrointestinal: Negative.    Genitourinary: Negative for flank pain.   Neurological:        Baseline dementia, some hallucinations "thought my ID badge was a box of cigarettes"    Psychiatric/Behavioral: Positive for depression, hallucinations and memory loss.   Allergic/Immunologic: Negative.      Objective:     Vital Signs (Most Recent):  Temp: 98.6 °F (37 °C) (09/06/18 0422)  Pulse: 70 (09/06/18 0700)  Resp: 20 (09/06/18 0422)  BP: (!) 186/78 (09/06/18 0422)  SpO2: (!) 92 % (09/06/18 0422) Vital Signs (24h Range):  Temp:  [98.2 °F (36.8 °C)-98.6 °F (37 °C)] 98.6 °F (37 °C)  Pulse:  [62-78] 70  Resp:  [18-20] 20  SpO2:  [92 %-98 %] 92 %  BP: (162-227)/(71-88) 186/78     Weight: 78.2 kg (172 lb 6.4 oz)  Body mass index is 37.31 kg/m².     SpO2: (!) 92 %  O2 Device (Oxygen Therapy): room air    No intake or output data in the 24 hours ending 09/06/18 0903    Lines/Drains/Airways     Peripheral Intravenous Line                 Peripheral IV - Single Lumen 09/05/18 1445 Left Antecubital less than 1 day                Physical Exam   Constitutional: She is oriented to person, place, and time. She appears well-developed and well-nourished. No " distress.   Truncal obesity noted     HENT:   Head: Normocephalic and atraumatic.   Mouth/Throat: No oropharyngeal exudate.   Eyes: No scleral icterus.   Neck: Normal range of motion. Neck supple. No JVD present.   Pulmonary/Chest: Effort normal and breath sounds normal. No respiratory distress. She has no wheezes.   Abdominal: Soft. Bowel sounds are normal. There is no tenderness.   Musculoskeletal: Normal range of motion. She exhibits no edema.   Neurological: She is alert and oriented to person, place, and time.   Skin: Skin is warm and dry. She is not diaphoretic.   Psychiatric:   Baseline dementia    Vitals reviewed.      Significant Labs:   Troponin   Recent Labs   Lab  09/05/18   1442  09/05/18   1815  09/06/18   0452   TROPONINI  0.899*  0.888*  1.357*   , All pertinent lab results from the last 24 hours have been reviewed. and   Recent Lab Results       09/06/18  0648 09/06/18  0452 09/05/18  1815 09/05/18  1720 09/05/18  1442      Immature Granulocytes  0.1   0.3     Immature Grans (Abs)  0.01  Comment:  Mild elevation in immature granulocytes is non specific and   can be seen in a variety of conditions including stress response,   acute inflammation, trauma and pregnancy. Correlation with other   laboratory and clinical findings is essential.     0.02  Comment:  Mild elevation in immature granulocytes is non specific and   can be seen in a variety of conditions including stress response,   acute inflammation, trauma and pregnancy. Correlation with other   laboratory and clinical findings is essential.       Albumin  2.7   3.0     Alkaline Phosphatase  136   146     ALT  20   21     Anion Gap  7   9     Appearance, UA Clear   Hazy      aPTT  24.0  Comment:  aPTT therapeutic range = 39-69 seconds   33.6  Comment:  aPTT therapeutic range = 39-69 seconds     AST  51   57     Bacteria, UA    None      Baso #  0.06   0.07     Basophil%  0.9   0.9     Bilirubin (UA) Negative   Negative      Total Bilirubin   0.7  Comment:  For infants and newborns, interpretation of results should be based  on gestational age, weight and in agreement with clinical  observations.  Premature Infant recommended reference ranges:  Up to 24 hours.............<8.0 mg/dL  Up to 48 hours............<12.0 mg/dL  3-5 days..................<15.0 mg/dL  6-29 days.................<15.0 mg/dL     0.8  Comment:  For infants and newborns, interpretation of results should be based  on gestational age, weight and in agreement with clinical  observations.  Premature Infant recommended reference ranges:  Up to 24 hours.............<8.0 mg/dL  Up to 48 hours............<12.0 mg/dL  3-5 days..................<15.0 mg/dL  6-29 days.................<15.0 mg/dL       BNP     482  Comment:  Values of less than 100 pg/ml are consistent with non-CHF populations.     BUN, Bld  22   23     Calcium  8.9   9.5     Chloride  110   106     Cholesterol     164  Comment:  The National Cholesterol Education Program (NCEP) has set the  following guidelines (reference ranges) for Cholesterol:  Optimal.....................<200 mg/dL  Borderline High.............200-239 mg/dL  High........................> or = 240 mg/dL       CO2  24   25     Color, UA Yellow   Yellow      CPK  143        CPK MB  4.6        Creatinine  1.3   1.5     Differential Method  Automated   Automated     eGFR if   43.2   36.4     eGFR if non   37.5  Comment:  Calculation used to obtain the estimated glomerular filtration  rate (eGFR) is the CKD-EPI equation.      31.5  Comment:  Calculation used to obtain the estimated glomerular filtration  rate (eGFR) is the CKD-EPI equation.        Eos #  0.4   0.5     Eosinophil%  6.0   6.7     Free T4     0.70     Glucose  81   107     Glucose, UA Negative   Negative      Gran # (ANC)  4.0   4.4     Gran%  57.4   58.1     HDL     37  Comment:  The National Cholesterol Education Program (NCEP) has set the  following guidelines  "(reference values) for HDL Cholesterol:  Low...............<40 mg/dL  Optimal...........>60 mg/dL       HDL/Chol Ratio     22.6     Hematocrit  34.8   40.9     Hemoglobin  11.6   13.4     Hyaline Casts, UA    10      Coumadin Monitoring INR  1.1  Comment:  Coumadin Therapy:  2.0 - 3.0 for INR for all indicators except mechanical heart valves  and antiphospholipid syndromes which should use 2.5 - 3.5.     3.6  Comment:  Coumadin Therapy:  2.0 - 3.0 for INR for all indicators except mechanical heart valves  and antiphospholipid syndromes which should use 2.5 - 3.5.       Ketones, UA Negative   Negative      LDL Cholesterol     100.6  Comment:  The National Cholesterol Education Program (NCEP) has set the  following guidelines (reference values) for LDL Cholesterol:  Optimal.......................<130 mg/dL  Borderline High...............130-159 mg/dL  High..........................160-189 mg/dL  Very High.....................>190 mg/dL       Leukocytes, UA Negative   Trace      Lymph #  1.2   1.4     Lymph%  17.6   18.8     Magnesium  1.9        MB%  3.2  Comment:  To be positive, the MB% must be greater than 5% AND the CK-MB  greater than 6.5 ng/mL. Values not in the reference interval,   but not qualifying as positive, should be considered "trace".          MCH  30.3   30.2     MCHC  33.3   32.8     MCV  91   92     Microscopic Comment    SEE COMMENT  Comment:  Other formed elements not mentioned in the report are not   present in the microscopic examination.         Mono #  1.2   1.1     Mono%  18.0   15.2     MPV  11.5   11.7     Nitrite, UA Negative   Negative      Non-HDL Cholesterol     127  Comment:  Risk category and Non-HDL cholesterol goals:  Coronary heart disease (CHD)or equivalent (10-year risk of CHD >20%):  Non-HDL cholesterol goal     <130 mg/dL  Two or more CHD risk factors and 10-year risk of CHD <= 20%:  Non-HDL cholesterol goal     <160 mg/dL  0 to 1 CHD risk factor:  Non-HDL cholesterol goal     " <190 mg/dL       nRBC  0   0     Occult Blood UA Negative   Negative      pH, UA 6.0   6.0      Phosphorus  3.5        Platelets  162   199     Potassium  4.2   5.8  Comment:  *No Visible Hemolysis     Total Protein  6.9   8.0     Protein, UA Negative  Comment:  Recommend a 24 hour urine protein or a urine   protein/creatinine ratio if globulin induced proteinuria is  clinically suspected.     1+  Comment:  Recommend a 24 hour urine protein or a urine   protein/creatinine ratio if globulin induced proteinuria is  clinically suspected.        Protime  11.5   34.9     RBC  3.83   4.44     RBC, UA    5      RDW  17.2   17.2     Sodium  141   140     Specific Gravity, UA >1.030   1.020      Specimen UA Urine, Clean Catch   Urine, Clean Catch      Squam Epithel, UA    17      Total Cholesterol/HDL Ratio     4.4     Triglycerides     132  Comment:  The National Cholesterol Education Program (NCEP) has set the  following guidelines (reference values) for triglycerides:  Normal......................<150 mg/dL  Borderline High.............150-199 mg/dL  High........................200-499 mg/dL       Troponin I  1.357  Comment:  The reference interval for Troponin I represents the 99th percentile   cutoff   for our facility and is consistent with 3rd generation assay   performance.   0.888  Comment:  The reference interval for Troponin I represents the 99th percentile   cutoff   for our facility and is consistent with 3rd generation assay   performance.    0.899  Comment:  The reference interval for Troponin I represents the 99th percentile   cutoff   for our facility and is consistent with 3rd generation assay   performance.       TSH     4.290     Urobilinogen, UA 4.0-6.0   4.0      WBC, UA    7      WBC  6.89   7.49                     Significant Imaging: reviewed , 2D Echo pending

## 2018-09-06 NOTE — NURSING
Patient troponin elevated above original value. Cardiology fellow notified per orders. MD assessed patient. MD ordered RN to d/c future troponin labs.

## 2018-09-06 NOTE — CONSULTS
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 Patient [place] [facility] [place] with altered mental staus, double vision, and hallucinations. Upon arrival BP > 200 and elevated troponin. 50% stenosis on CTA, not related to cause of stroke. MRI with scattered punctate areas of restricted diffusion, echo pending. Cardiology following unclear why INR was elevated, now normalized. Trending troponins.     Functional History: Patient lives with daughter single story home with 0 steps to enter.  Prior to admission, (I) with ADLs and mobility, no driving 2/2 to macular degeneration. DME: none.     Hospital Course:   9/6/18: Evaluated by PT. Bed mobility CGA. Sit to stand foreign-HHA. Ambulated 42 ft Foreign w/ RW.     Past Medical History:   Diagnosis Date    Arthritis     Cataract     Essential hypertension 9/5/2018    Macular degeneration      Past Surgical History:   Procedure Laterality Date    CATARACT EXTRACTION  2003    LEFT EYE     Review of patient's allergies indicates:  No Known Allergies    Scheduled Medications:    amLODIPine  5 mg Oral Daily    aspirin  81 mg Oral QHS    atorvastatin  40 mg Oral Daily    heparin (porcine)  5,000 Units Subcutaneous Q8H    lisinopril  10 mg Oral Daily    sodium chloride 0.9%  3 mL Intravenous Q8H       PRN Medications: acetaminophen, labetalol, ondansetron,  sodium chloride 0.9%    Family History     Problem Relation (Age of Onset)    Retinal detachment Sister    Thyroid disease Sister        Tobacco Use    Smoking status: Never Smoker    Smokeless tobacco: Never Used   Substance and Sexual Activity    Alcohol use: No    Drug use: Not on file    Sexual activity: Not on file     Review of Systems   Constitutional: Negative for fatigue and fever.   HENT: Negative for sore throat and trouble swallowing.    Eyes: Negative for visual disturbance.   Respiratory: Negative for cough and shortness of breath.    Cardiovascular: Negative for chest pain and leg swelling.   Gastrointestinal: Negative for abdominal distention and abdominal pain.   Genitourinary: Negative for difficulty urinating.   Musculoskeletal: Positive for gait problem. Negative for back pain.   Skin: Negative for color change.   Neurological: Negative for dizziness, light-headedness and headaches.   Psychiatric/Behavioral: Positive for confusion. Negative for agitation.     Objective:     Vital Signs (Most Recent):  Temp: 98.6 °F (37 °C) (09/06/18 0800)  Pulse: 77 (09/06/18 1100)  Resp: 20 (09/06/18 0800)  BP: 128/60 (09/06/18 0800)  SpO2: 95 % (09/06/18 0800)    Vital Signs (24h Range):  Temp:  [98.2 °F (36.8 °C)-98.6 °F (37 °C)] 98.6 °F (37 °C)  Pulse:  [62-78] 77  Resp:  [18-20] 20  SpO2:  [92 %-98 %] 95 %  BP: (128-227)/(60-88) 128/60     Body mass index is 37.31 kg/m².    Physical Exam   Constitutional: She is oriented to person, place, and time. She appears well-developed and well-nourished.   Eyes: EOM are normal. Pupils are equal, round, and reactive to light.   Neck: Normal range of motion. Neck supple.   Cardiovascular: Normal rate and regular rhythm.   Pulmonary/Chest: Effort normal and breath sounds normal.   Abdominal: Soft. Normal appearance and bowel sounds are normal. She exhibits no abdominal bruit. There is no rigidity.   Musculoskeletal: Normal range of motion.   Neurological: She is  alert and oriented to person, place, and time.   -  Mental Status: AAOx3. Follows commands. Answers correct age and . Recalls 3/3 objects.   -  Speech and language: dysarthria present.    -  Vision: difficult with macular degeneration    -  Facial movement (CN VII): symmetrical  -  Motor: RUE: 4/5, 4/5 .  LUE: 4/5, 4/5 . RLE: 4/5, DF 4/5, PF 4/5.  LLE: 4/5, DF 4/5, PF 4/5. Mild weakness present.   -  Sensory:  Intact to light touch    Skin: Skin is warm and dry.   Psychiatric: She has a normal mood and affect.     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      Recommendations  -  Encourage mobility, OOB in chair at least 3 hours per day, and early ambulation as appropriate   -  PT/OT evaluate and treat  -  SLP speech and cognitive evaluate and treat  -  Monitor sleep disturbances and establish consistent sleep-wake cycle  -  Monitor for bowel and bladder dysfunction  -  Monitor for shoulder pain and subluxation  -  Monitor for spasticity  -  Monitor for and prevent skin breakdown and pressure ulcers  · Early mobility, repositioning/weight shifting every 20-30 minutes when sitting, turn patient every 2 hours, proper mattress/overlay and chair cushioning, pressure relief/heel protector boots  -  DVT prophylaxis  -  Reviewed discharge options (IP rehab, SNF, HH therapy, and OP therapy)        Elevated INR    - Cardiology following, now normalized        Mixed hyperlipidemia    - Stroke risk factor        Essential hypertension    - Stroke risk factor        Elevated troponin    - Cardiology following and trending           Will follow progress and discuss with rehab team for post acute care/rehab recommendation.    Thank you for your consult.     Alma Meza NP  Department of  Physical Medicine & Rehab  [facility]

## 2018-09-06 NOTE — PLAN OF CARE
Problem: Occupational Therapy Goal  Goal: Occupational Therapy Goal  Goals to be met by: 9/16     Patient will increase functional independence with ADLs by performing:    UE Dressing with Minimal Assistance.  LE Dressing with Stand-by Assistance.  Grooming while standing with Stand-by Assistance.  Toileting from toilet with Stand-by Assistance for hygiene and clothing management.   Rolling to Bilateral with Modified [place].   Supine to sit with Supervision.  Stand pivot transfers with Supervision.    Outcome: Ongoing (interventions implemented as appropriate)  OT eval completed.

## 2018-09-06 NOTE — CONSULTS
Reviewed patient history and current admission.  Rehab team following.  Full consult to follow.    CONCHITA Dawson, PINOP-C  Physical Medicine & Rehabilitation   09/06/2018  Spectralink: [id number]

## 2018-09-06 NOTE — PT/OT/SLP EVAL
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  Pt was independent with all ADL's & ambulation.  Pt has a walk-in shower for bathing.  Pt does not drive.  Pt was a housewife & enjoyed working with ceramics.  History obtained via a combination of reports from pt & from family due to inconsistent correctness of pt responses.  Roles and Routines: mother, grandmother  Equipment Used at Home:  (built-in shower bench, hand held shower hose)  Assistance upon Discharge: family reported they would be able to assist as needed    Past Medical History:   Diagnosis Date    Arthritis     Cataract     Essential hypertension 9/5/2018    Macular degeneration        Past Surgical History:   Procedure Laterality Date    CATARACT EXTRACTION  2003    LEFT EYE       Subjective     Chief Complaint: Increased confusion at time of evaluation  Patient/Family Comments/goals: for pt to get better    Pain/Comfort:  · Pain Rating 1: 0/10  · Pain Rating Post-Intervention 1: 0/10    Patients cultural, spiritual, Latter-day conflicts given the current situation:      Objective:     Communicated with: RN prior to session.  Patient found all lines intact, call button in reach and family members present and telemetry upon OT entry to room.    General Precautions: Standard, aspiration, fall, vision impaired   Orthopedic Precautions:    Braces:       Occupational Performance:    Bed Mobility:    · Patient completed Scooting/Bridging with moderate assistance scooting forward on EOB & dependent drawsheet transfer up HOB while supine  · Patient  completed Supine to Sit with moderate assistance  · Patient completed Sit to Supine with minimum assistance    Functional Mobility/Transfers:  · Patient completed Sit <> Stand Transfer with minimum assistance  with  no assistive device   · Functional Mobility: Pt took ~ 2 steps forward & backward with Min (A).    Activities of Daily Living:  · Upper Body Dressing: maximal assistance donning gown around back while seated EOB  · Lower Body Dressing: moderate assistance donning socks while seated EOB    Cognitive/Visual Perceptual:  Cognitive/Psychosocial Skills:     -       Oriented to: Person and month only   -       Follows Commands/attention:Follows one-step commands  -       Communication: dysarthria  -       Memory: Impaired STM  -       Safety awareness/insight to disability: impaired   -       Mood/Affect/Coping skills/emotional control: Appropriate to situation  Visual/Perceptual:      -macular degeneration affecting central vision      Physical Exam:  Postural examination/scapula alignment:    -       Rounded shoulders  -       Forward head  -       Posterior pelvic tilt  Sensation:    -       Intact BUE for light touch  Dominant hand:    -       right  Upper Extremity Range of Motion:     -       Right Upper Extremity: WFL except 90* shoulder flexion    -       Left Upper Extremity: WFL except 90* shoulder flexion      Upper Extremity Strength:    -       Right Upper Extremity: WFL except 3-/5 shoulder flexion  -       Left Upper Extremity: WFL except 3-/5 shoulder flexion      Strength:    -       Right Upper Extremity: WFL    -       Left Upper Extremity: WFL      AMPAC 6 Click ADL:  AMPAC Total Score: 14    Treatment & Education:  Provided education regarding role of OT, POC, & discharge recommendations with pt & family verbalizing understanding.  Provided education regarding safety, discussed changes in pt per family report since this morning, and family reported that they will be able to provide  "assistance as needed upon discharge home.  Pt had no further questions & when asked whether there were any concerns pt reported none.      Education:    Patient left supine with all lines intact, call button in reach, bed alarm on, RN notified, family members present and white board updatd    Assessment:     Mitzy Perez is a 85 y.o. 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  She presents with the following performance deficits affecting function: weakness, impaired endurance, impaired self care skills, impaired functional mobilty, impaired balance, visual deficits, impaired cognition, decreased upper extremity function, decreased lower extremity function, decreased coordination, decreased safety awareness.      Rehab Prognosis: Fair; patient would benefit from acute skilled OT services to address these deficits and reach maximum level of function.         Clinical Decision Makin.  OT Mod:  "Pt evaluation falls under moderate complexity for evaluation coding due to identification of 3-5 performance deficits noted as stated above. Eval required Min/Mod assistance to complete on this date and detailed assessment(s) were utilized. Moreover, an expanded review of history and occupational profile obtained with additional review of cognitive, physical and psychosocial hx."     Plan:     Patient to be seen 4 x/week to address the above listed problems via self-care/home management, therapeutic activities, therapeutic exercises, sensory integration, cognitive retraining, neuromuscular re-education  · Plan of Care Expires: 10/06/18  · Plan of Care Reviewed with: patient, daughter, family, grandchild(paty)    This Plan of care has been discussed with the patient who was involved in its development and understands and is in agreement with the identified goals and treatment plan    GOALS:   Multidisciplinary Problems     Occupational Therapy Goals        Problem: " Occupational Therapy Goal    Goal Priority Disciplines Outcome Interventions   Occupational Therapy Goal     OT, PT/OT Ongoing (interventions implemented as appropriate)    Description:  Goals to be met by: 9/16     Patient will increase functional independence with ADLs by performing:    UE Dressing with Minimal Assistance.  LE Dressing with Stand-by Assistance.  Grooming while standing with Stand-by Assistance.  Toileting from toilet with Stand-by Assistance for hygiene and clothing management.   Rolling to Bilateral with Modified [place].   Supine to sit with Supervision.  Stand pivot transfers with Supervision.                      Time Tracking:     OT Date of Treatment: 09/06/18  OT Start Time: 1354  OT Stop Time: 1418  OT Total Time (min): 24 min    Billable Minutes:Evaluation 14  Therapeutic Activity 10    DALJIT Rodríguez  9/6/2018

## 2018-09-06 NOTE — SUBJECTIVE & OBJECTIVE
Past Medical History:   Diagnosis Date    Arthritis     Cataract     Essential hypertension 9/5/2018    Macular degeneration      Past Surgical History:   Procedure Laterality Date    CATARACT EXTRACTION  2003    LEFT EYE     Review of patient's allergies indicates:  No Known Allergies    Scheduled Medications:    amLODIPine  5 mg Oral Daily    aspirin  81 mg Oral QHS    atorvastatin  40 mg Oral Daily    heparin (porcine)  5,000 Units Subcutaneous Q8H    lisinopril  10 mg Oral Daily    sodium chloride 0.9%  3 mL Intravenous Q8H       PRN Medications: acetaminophen, labetalol, ondansetron, sodium chloride 0.9%    Family History     Problem Relation (Age of Onset)    Retinal detachment Sister    Thyroid disease Sister        Tobacco Use    Smoking status: Never Smoker    Smokeless tobacco: Never Used   Substance and Sexual Activity    Alcohol use: No    Drug use: Not on file    Sexual activity: Not on file     Review of Systems   Constitutional: Negative for fatigue and fever.   HENT: Negative for sore throat and trouble swallowing.    Eyes: Negative for visual disturbance.   Respiratory: Negative for cough and shortness of breath.    Cardiovascular: Negative for chest pain and leg swelling.   Gastrointestinal: Negative for abdominal distention and abdominal pain.   Genitourinary: Negative for difficulty urinating.   Musculoskeletal: Positive for gait problem. Negative for back pain.   Skin: Negative for color change.   Neurological: Negative for dizziness, light-headedness and headaches.   Psychiatric/Behavioral: Positive for confusion. Negative for agitation.     Objective:     Vital Signs (Most Recent):  Temp: 98.6 °F (37 °C) (09/06/18 0800)  Pulse: 77 (09/06/18 1100)  Resp: 20 (09/06/18 0800)  BP: 128/60 (09/06/18 0800)  SpO2: 95 % (09/06/18 0800)    Vital Signs (24h Range):  Temp:  [98.2 °F (36.8 °C)-98.6 °F (37 °C)] 98.6 °F (37 °C)  Pulse:  [62-78] 77  Resp:  [18-20] 20  SpO2:  [92 %-98 %] 95  %  BP: (128-227)/(60-88) 128/60     Body mass index is 37.31 kg/m².    Physical Exam   Constitutional: She is oriented to person, place, and time. She appears well-developed and well-nourished.   Eyes: EOM are normal. Pupils are equal, round, and reactive to light.   Neck: Normal range of motion. Neck supple.   Cardiovascular: Normal rate and regular rhythm.   Pulmonary/Chest: Effort normal and breath sounds normal.   Abdominal: Soft. Normal appearance and bowel sounds are normal. She exhibits no abdominal bruit. There is no rigidity.   Musculoskeletal: Normal range of motion.   Neurological: She is alert and oriented to person, place, and time.   -  Mental Status: AAOx3. Follows commands. Answers correct age and . Recalls 3/3 objects.   -  Speech and language: dysarthria present.    -  Vision: difficult with macular degeneration    -  Facial movement (CN VII): symmetrical  -  Motor: RUE: 4/5, 4/5 .  LUE: 4/5, 4/5 . RLE: 4/5, DF 4/5, PF 4/5.  LLE: 4/5, DF 4/5, PF 4/5. Weakness present.   -  Sensory:  Intact to light touch    Skin: Skin is warm and dry.   Psychiatric: She has a normal mood and affect.     NEUROLOGICAL EXAMINATION:     MENTAL STATUS   Oriented to person, place, and time.     CRANIAL NERVES     CN III, IV, VI   Pupils are equal, round, and reactive to light.  Extraocular motions are normal.       Diagnostic Results:   Labs: Reviewed  ECG: Reviewed  CT: Reviewed  MRI: Reviewed

## 2018-09-06 NOTE — PROGRESS NOTES
Cardiology Note  Called by RN about Trop 1.35 from 0.83 patient asymptomatic and known to have CVA, uncontrolled HTN, untreated. No chest pain or SOB. Get TTE, stop trending troponins. This is unlikely an ACS.   Discussed with Dr Guo, consult fellow.

## 2018-09-06 NOTE — ASSESSMENT & PLAN NOTE
-Continue to recommend starting Amlodipine 10mg QD and Lisinopril 20mg QD  -f/u with PCP in resident clinic or priority if able to

## 2018-09-06 NOTE — ASSESSMENT & PLAN NOTE
85 year old female with past medical hx of osteoarthritis and macular degeneration presented to the ED today after complaints of light headedness and decrease in vision. Her daughter is at the bedside and states patient has baseline vision loss from macular degeneration but is able to see peripherally. She noticed over the past few days her mom has been slower to speak and her speech had become more slurred. Patient states since yesterday she feels like she is leaning towards one side when she walks. She denies any unilateral weakness or facial droop. MRI was completed in the ED revealing multifocal punctate regions of restricted diffusion. Will admit to Vascular Neurology for stroke work up.     On exam patient moves all extremities 5/5. Hard to assess visual fields due to vision loss from macular degeneration. Patient stating her vision is not blurrier then normal but daughter stating it is. Patient Oriented X 3 but slow respond. Dysarthria is present on exam.    Antithrombotics for secondary stroke prevention: Antiplatelets: Aspirin: 81 mg daily    Statins for secondary stroke prevention and hyperlipidemia, if present:   Statins: Atorvastatin- 40 mg daily - , mild elevation of LFTs, continue to monitor     Aggressive risk factor modification: HTN, HLD, Diet, Obesity     Rehab efforts: PT/OT/SLP to evaluate and treat     Diagnostics ordered/pending:echo     VTE prophylaxis: INR normalized, may have been lab error - now 1.1. Hep vte and scds     BP parameters: Infarct: No intervention, SBP <220

## 2018-09-06 NOTE — SUBJECTIVE & OBJECTIVE
Past Medical History:   Diagnosis Date    Arthritis     Cataract     Macular degeneration      Past Surgical History:   Procedure Laterality Date    CATARACT EXTRACTION  2003    LEFT EYE     Family History   Problem Relation Age of Onset    Retinal detachment Sister     Thyroid disease Sister     Amblyopia Neg Hx     Blindness Neg Hx     Cancer Neg Hx     Cataracts Neg Hx     Diabetes Neg Hx     Glaucoma Neg Hx     Hypertension Neg Hx     Macular degeneration Neg Hx     Strabismus Neg Hx     Stroke Neg Hx      Social History     Tobacco Use    Smoking status: Never Smoker    Smokeless tobacco: Never Used   Substance Use Topics    Alcohol use: No    Drug use: Not on file     Review of patient's allergies indicates:  No Known Allergies    Medications: I have reviewed the current medication administration record.      (Not in a hospital admission)    Review of Systems   Constitutional: Negative for fever.   HENT: Negative for trouble swallowing.    Eyes: Positive for visual disturbance.   Respiratory: Negative for shortness of breath.    Cardiovascular: Negative for chest pain.   Gastrointestinal: Negative for nausea and vomiting.   Genitourinary: Negative for urgency.   Musculoskeletal: Positive for gait problem.   Neurological: Positive for speech difficulty. Negative for facial asymmetry, weakness and numbness.   Psychiatric/Behavioral: Positive for confusion. Negative for agitation.     Objective:     Vital Signs (Most Recent):  Temp: 98.6 °F (37 °C) (09/05/18 1838)  Pulse: 72 (09/05/18 1918)  Resp: 18 (09/05/18 1838)  BP: (!) 185/80 (09/05/18 1918)  SpO2: 97 % (09/05/18 1918)    Vital Signs Range (Last 24H):  Temp:  [98.2 °F (36.8 °C)-98.6 °F (37 °C)]   Pulse:  [66-78]   Resp:  [18]   BP: (162-221)/(71-88)   SpO2:  [93 %-98 %]     Physical Exam   Constitutional: She is oriented to person, place, and time. She appears well-developed.   HENT:   Head: Normocephalic and atraumatic.   Eyes: EOM  are normal. Pupils are equal, round, and reactive to light.   Neck: Normal range of motion.   Cardiovascular: Normal rate.   Pulmonary/Chest: Effort normal.   Abdominal: Soft. She exhibits no distension.   Musculoskeletal: Normal range of motion.   Neurological: She is alert and oriented to person, place, and time.   Skin: Skin is warm. Capillary refill takes less than 2 seconds.   Psychiatric: She has a normal mood and affect.   Vitals reviewed.      Neurological Exam:   LOC: alert  Attention Span: Good   Language: No aphasia  Articulation: Dysarthria  Orientation: Person, Place, Time   Visual Fields: Impaired due to macular degeneration  EOM (CN III, IV, VI): Full/intact  Pupils (CN II, III): PERRL  Facial Movement (CN VII): Symmetric facial expression    Motor: 5/5 all extremities   Cebellar: No evidence of appendicular or axial ataxia  Sensation: Intact to light touch, temperature and vibration      Laboratory:  CMP:   Recent Labs   Lab  09/05/18   1442   CALCIUM  9.5   ALBUMIN  3.0*   PROT  8.0   NA  140   K  5.8*   CO2  25   CL  106   BUN  23   CREATININE  1.5*   ALKPHOS  146*   ALT  21   AST  57*   BILITOT  0.8     CBC:   Recent Labs   Lab  09/05/18   1442   WBC  7.49   RBC  4.44   HGB  13.4   HCT  40.9   PLT  199   MCV  92   MCH  30.2   MCHC  32.8     Lipid Panel: No results for input(s): CHOL, LDLCALC, HDL, TRIG in the last 168 hours.  Coagulation:   Recent Labs   Lab  09/05/18   1442   INR  3.6*   APTT  33.6*     Hgb A1C: No results for input(s): HGBA1C in the last 168 hours.  TSH: No results for input(s): TSH in the last 168 hours.    Diagnostic Results:      Brain imaging:  MRI 9/5  1. Multifocal punctate regions of restricted diffusion involving the left basal ganglia, left parietal region, left posterior corpus callosum, bilateral occipital lobes, and scattered within the cerebellum, concerning for multifocal punctate infarcts, likely of embolic source.  Is a focus of hemorrhage hemorrhage/hemosiderin  deposition within the left basal ganglia, likely with associated infarct.  2. Sequela of chronic microvascular ischemic change noting remote appearing infarcts involving the cerebellum and likely cerebellar vermis.  3. Small amount of fluid within the left mastoid air cells.    Vessel Imaging:  CTA pending     Cardiac Evaluation:   Echo pending

## 2018-09-06 NOTE — SUBJECTIVE & OBJECTIVE
Neurologic Chief Complaint: multifocal punctate strokes     Subjective:     Interval History: Patient is seen for follow-up neurological assessment and treatment recommendations: no events overnight   Elevated troponin, denies cp, palpitation, sob - note from cards in chart  Elevated INR (3.6) on presentation but on repeat 1.1     Denies complaints today     HPI, Past Medical, Family, and Social History remains the same as documented in the initial encounter.     Review of Systems   Constitutional: Negative for fever.   Eyes: Positive for visual disturbance.   Respiratory: Negative for shortness of breath and stridor.    Cardiovascular: Negative for chest pain, palpitations and leg swelling.   Neurological: Positive for speech difficulty. Negative for weakness.     Scheduled Meds:   aspirin  81 mg Oral QHS    atorvastatin  40 mg Oral Daily    sodium chloride 0.9%  3 mL Intravenous Q8H     Continuous Infusions:   sodium chloride 0.9% 75 mL/hr at 09/05/18 1945    sodium chloride 0.9%       PRN Meds:acetaminophen, labetalol, ondansetron, sodium chloride 0.9%    Objective:     Vital Signs (Most Recent):  Temp: 98.6 °F (37 °C) (09/06/18 0422)  Pulse: 70 (09/06/18 0700)  Resp: 20 (09/06/18 0422)  BP: (!) 186/78 (09/06/18 0422)  SpO2: (!) 92 % (09/06/18 0422)  BP Location: Right arm    Vital Signs Range (Last 24H):  Temp:  [98.2 °F (36.8 °C)-98.6 °F (37 °C)]   Pulse:  [62-78]   Resp:  [18-20]   BP: (162-227)/(71-88)   SpO2:  [92 %-98 %]   BP Location: Right arm    Physical Exam   Constitutional: She is oriented to person, place, and time. She appears well-developed and well-nourished.   HENT:   Head: Normocephalic and atraumatic.   Cardiovascular: Normal rate.   Pulmonary/Chest: Effort normal.   Musculoskeletal: Normal range of motion. She exhibits no edema.   Neurological: She is alert and oriented to person, place, and time.   Nursing note and vitals reviewed.      Neurological Exam:   LOC: alert  Attention Span:  Good   Language: No aphasia  Articulation: Dysarthria  Orientation: Person, Place, Time   Visual Fields: vision deficit present due to macular degeneration, slightly worse today than her baseline  EOM (CN III, IV, VI): Full/intact  Motor: Arm left  Normal 5/5  Leg left  Normal 5/5  Arm right  Normal 5/5  Leg right Normal 5/5  Sensation: Intact to light touch, temperature and vibration    Laboratory:  CMP:   Recent Labs   Lab  09/06/18   0452   CALCIUM  8.9   ALBUMIN  2.7*   PROT  6.9   NA  141   K  4.2   CO2  24   CL  110   BUN  22   CREATININE  1.3   ALKPHOS  136*   ALT  20   AST  51*   BILITOT  0.7     CBC:   Recent Labs   Lab  09/06/18   0452   WBC  6.89   RBC  3.83*   HGB  11.6*   HCT  34.8*   PLT  162   MCV  91   MCH  30.3   MCHC  33.3     Lipid Panel:   Recent Labs   Lab  09/05/18   1442   CHOL  164   LDLCALC  100.6   HDL  37*   TRIG  132     Coagulation:   Recent Labs   Lab  09/06/18   0452   INR  1.1   APTT  24.0     Platelet Aggregation Study: No results for input(s): PLTAGG, PLTAGINTERP, PLTAGREGLACO, ADPPLTAGGREG in the last 168 hours.  Hgb A1C: No results for input(s): HGBA1C in the last 168 hours.  TSH:   Recent Labs   Lab  09/05/18   1442   TSH  4.290*       Diagnostic Results     Brain Imaging   9/5/18 - MRI   1. Multifocal punctate regions of restricted diffusion involving the left basal ganglia, left parietal region, left posterior corpus callosum, bilateral occipital lobes, and scattered within the cerebellum, concerning for multifocal punctate infarcts, likely of embolic source.  Is a focus of hemorrhage hemorrhage/hemosiderin deposition within the left basal ganglia, likely with associated infarct.  2. Sequela of chronic microvascular ischemic change noting remote appearing infarcts involving the cerebellum and likely cerebellar vermis.  3. Small amount of fluid within the left mastoid air cells.    9/5/18 - CTA   CT brain:    Previously identified areas of scattered acute punctate infarction  present however not as well characterized as when compared with same-date MRI.  No new intraparenchymal hemorrhage or major vascular distribution infarct.    Mild generalized cerebral volume loss and chronic microvascular ischemic change.    CTA head/neck:    Significant mixed calcific/noncalcific atherosclerotic change at bilateral carotid bifurcations with approximately 50% stenosis of the proximal right internal carotid artery per NASCET criteria.    Bilateral vertebral artery origins demonstrate mild narrowing, left greater than right, with the left vertebral origin demonstrating significant tortuosity.  Mild atherosclerotic change of the left V4 with minimal associated narrowing.    No significant focal stenosis, focal occlusion, or intracranial aneurysm formation of the intracranial vasculature.    Advanced degenerative changes of the cervical spine at the C5-C6 level.    Additional details above.    Echo pending

## 2018-09-06 NOTE — PROCEDURES
ROUTINE ELECTROENCEPHALOGRAM REPORT      Mitzy Perez  [id number]  1/23/1933    DATE OF SERVICE: 9/6/18    REQUESTING PROVIDER: MAGALY Christina    REASON FOR CONSULT: 86yo F admitted with visual abnormalities    METHODOLOGY   Electroencephalographic (EEG) recording is with electrodes placed according to the International 10-20 placement system.  Thirty two (32) channels of digital signal (sampling rate of 512/sec) including T1 and T2 was simultaneously recorded from the scalp and may include  EKG, EMG, and/or eye monitors.  Recording band pass was 0.1 to 512 hz.  Digital video recording of the patient is simultaneously recorded with the EEG.  The patient is instructed report clinical symptoms which may occur during the recording session.  EEG and video recording is stored and archived in digital format. Activation procedures which include photic stimulation, hyperventilation and instructing patients to perform simple task are done in selected patients.    The EEG is displayed on a monitor screen and can be reviewed using different montages.  Computer assisted analysis is employed to detect spike and electrographic seizure activity.   The entire record is submitted for computer analysis.  The entire recording is visually reviewed and the times identified by computer analysis as being spikes or seizures are reviewed again.  Compresses spectral analysis (CSA) is also performed on the activity recorded from each individual channel.  This is displayed as a power display of frequencies from 0 to 30 Hz over time.   The CSA is reviewed looking for asymmetries in power between homologous areas of the scalp and then compared with the original EEG recording.     [employer] software was also utilized in the review of this study.  This software suite analyzes the EEG recording in multiple domains.  Coherence and rhythmicity is computed to identify EEG sections which may contain organized seizures.  Each channel undergoes  analysis to detect presence of spike and sharp waves which have special and morphological characteristic of epileptic activity.  The routine EEG recording is converted from spacial into frequency domain.  This is then displayed comparing homologous areas to identify areas of significant asymmetry.  Algorithm to identify non-cortically generated artifact is used to separate eye movement, EMG and other artifact from the EEG    EEG FINDINGS  Background activity:   The background rhythm was characterized by theta-alpha (7-8 Hz) activity with a 8 Hz posterior dominant alpha rhythm at 30-70 microvolts.   Symmetry and continuity: The background was continuous and symmetric    Sleep:   Not seen.    Activation procedures:   Hyperventilation and photic stimulation were not performed   Appropriately responsive to verbal stimulation    Abnormal activity:   No epileptiform discharges, periodic discharges, lateralized rhythmic delta activity or electrographic seizures were seen.    IMPRESSION:   This is an abnormal routine EEG due to the mild generalized slowing seen, suggestive of mild diffuse or multifocal cerebral dysfunction.    No epileptiform activity or electrographic seizures seen.    CLINICAL CORRELATION IS RECOMMENDED.    Alis Sow MD, RAUL(), GLO [place].  Neurology-Epilepsy.  [facility].

## 2018-09-06 NOTE — HPI
Mitzy Perez is a 85-year-old female with PMHx of HTN and macular degeneration. [place] [facility] [place] with altered mental staus, double vision, and hallucinations. Upon arrival BP > 200 and elevated troponin. 50% stenosis on CTA, not related to cause of stroke. MRI with scattered punctate areas of restricted diffusion, echo pending. Cardiology following unclear why INR was elevated, now normalized. Trending troponins.     Functional History: Patient lives with daughter single story home with 0 steps to enter.  Prior to admission, (I) with ADLs and mobility, no driving 2/2 to macular degeneration. DME: none.

## 2018-09-06 NOTE — ASSESSMENT & PLAN NOTE
INR 3.6 on Admission  Unclear why INR is elevated, now normalized   Potential lab error?  Patient and daughter denies use of AC

## 2018-09-06 NOTE — H&P
[facility]  Vascular Neurology  Comprehensive Stroke Center  History & Physical    Inpatient consult to Vascular (Stroke) Neurology  Consult performed by: Rosa Jones NP  Consult ordered by: Joaquin Aiken PA-C  Reason for consult: Stroke         Assessment/Plan:     Patient is a 85 y.o. 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 Her daughter is at the bedside and states patient has baseline vision loss from macular degeneration but is able to see peripherally. She noticed over the past few days her mom has been slower to speak and her speech had become more slurred. Patient states since yesterday she feels like she is leaning towards one side when she walks. She denies any unilateral weakness or facial droop. MRI was completed in the ED revealing multifocal punctate regions of restricted diffusion. Will admit to Vascular Neurology for stroke work up.     On exam patient moves all extremities 5/5. Hard to assess visual fields due to vision loss from macular degeneration. Patient stating her vision is not blurrier then normal but daughter stating it is. Patient Oriented X 3 but slow respond. Dysarthria is present on exam.    Antithrombotics for secondary stroke prevention: Antiplatelets: Aspirin: 81 mg daily    Statins for secondary stroke prevention and hyperlipidemia, if present:   Statins: Atorvastatin- 40 mg daily - LDL pending    Aggressive risk factor modification: HTN, HLD, Diet, Obesity     Rehab efforts: PT/OT/SLP to evaluate and treat    Diagnostics ordered/pending: CTA Head to assess vasculature , CTA Neck/Arch to assess vasculature, HgbA1C to assess blood glucose levels, Lipid Profile to assess cholesterol levels, TTE to assess cardiac function/status , TSH to  assess thyroid function    VTE prophylaxis: Currently holding heparin due to elevated INR mechanical SCDS    BP parameters: Infarct: No intervention, SBP <220            Mixed hyperlipidemia    Stroke risk factor  LDL pending  Atorvastatin 40 mg         Essential hypertension    Stroke risk factor   SBP < 220   PRN ordered  Does not have hx of HTN - systolic in the 200's in the ER        Elevated troponin    Cardiology consulted in ED - see recs   - troponin for one more check 6 hours after original; if significantly elevated from current, alert cardiology fellow on call     Elevated INR        INR 3.6 on admission        Unclear why INR is elevated        Patient and daughter deny use of AC       STROKE DOCUMENTATION          NIH Scale:  1a. Level Of Consciousness: 0-->Alert: keenly responsive  1b. LOC Questions: 0-->Answers both questions correctly  1c. LOC Commands: 0-->Performs both tasks correctly  2. Best Gaze: 0-->Normal  3. Visual: 1-->Partial hemianopia  4. Facial Palsy: 0-->Normal symmetrical movements  5a. Motor Arm, Left: 0-->No drift: limb holds 90 (or 45) degrees for full 10 secs  5b. Motor Arm, Right: 0-->No drift: limb holds 90 (or 45) degrees for full 10 secs  6a. Motor Leg, Left: 0-->No drift: leg holds 30 degree position for full 5 secs  6b. Motor Leg, Right: 0-->No drift: leg holds 30 degree position for full 5 secs  7. Limb Ataxia: 0-->Absent  8. Sensory: 0-->Normal: no sensory loss  9. Best Language: 0-->No aphasia: normal  10. Dysarthria: 1-->Mild-to-moderate dysarthria: patient slurs at least some words and, at worst, can be understood with some difficulty  11. Extinction and Inattention (formerly Neglect): 0-->No abnormality  Total (NIH Stroke Scale): 2     Modified Todd Score: 0  Romina Coma Scale:    ABCD2 Score:    [id number] Score:   HAS -BLED Score:   ICH Score:   Hunt & Pandya Classification:      Thrombolysis Candidate? No, Out of window       Interventional Revascularization  Candidate?   Is the patient eligible for mechanical endovascular reperfusion (EDGARDO)?  No; No large vessel occlusion    Hemorrhagic change of an Ischemic Stroke: Does this patient have an ischemic stroke with hemorrhagic changes? No         Subjective:     History of Present Illness:  85 year old female with past medical hx of osteoarthritis and macular degeneration presented to the ED today after complaints of light headedness and decrease in vision. Her daughter is at the bedside and states patient has baseline vision loss from macular degeneration but is able to see peripherally. She noticed over the past few days her mom has been slower to speak and her speech had become more slurred. Patient states since yesterday she feels like she is leaning towards one side when she walks. Patient performs ADLS independently and at baseline does not need equipment assistance to walk. She lives at home with her daughter. She denies any unilateral weakness or facial droop. MRI was completed in the ED revealing multifocal punctate regions of restricted diffusion. Will admit to Vascular Neurology for stroke work up.       No new subjective & objective note has been filed under this hospital service since the last note was generated.        Rosa Jones NP  [facility]  Department of Vascular Neurology   [facility]

## 2018-09-06 NOTE — PLAN OF CARE
Problem: Physical Therapy Goal  Goal: Physical Therapy Goal  Goals to be met by: 18     Patient will increase functional independence with mobility by performin. Supine to sit with Set-up [place]  2. Sit to supine with Set-up [place]  3. Sit to stand transfer with Supervision w/ RW  4. Bed to chair transfer with Supervision using Rolling Walker  5. Gait  x 60 feet with Stand-by Assistance using Rolling Walker, w/ minimal cues for safety  6. Lower extremity exercise program x25 reps per handout, with supervision  Outcome: Ongoing (interventions implemented as appropriate)  PT Goals established following initial eval    Nilson Gamboa, PT, DPT  2018  Pager [phone]

## 2018-09-06 NOTE — ASSESSMENT & PLAN NOTE
Cardiology consulted in ED - see recs   - troponin for one more check 6 hours after original; if significantly elevated from current, alert cardiology fellow on call  Appreciate cardiology recs

## 2018-09-07 PROBLEM — R79.1 ELEVATED INR: Status: RESOLVED | Noted: 2018-01-01 | Resolved: 2018-01-01

## 2018-09-07 PROBLEM — R44.1 VISUAL HALLUCINATION: Status: ACTIVE | Noted: 2018-01-01

## 2018-09-07 NOTE — TELEPHONE ENCOUNTER
----- Message from Katiana Barcenas RN sent at 9/7/2018  4:41 PM CDT -----  Neuro follow-up appt. Please make a follow-up appt in 4-6 weeks. Patient was inpatient and seen by Dr García. Please contact patient with appointment date and time.

## 2018-09-07 NOTE — ASSESSMENT & PLAN NOTE
85 year old female with past medical hx of osteoarthritis and macular degeneration presented to the ED after complaints of light headedness and decrease in vision. Her daughter is at the bedside and states patient has baseline vision loss from macular degeneration but is able to see peripherally. She noticed over the past few days her mom has been slower to speak and her speech had become more slurred. Patient states since yesterday she feels like she is leaning towards one side when she walks. She denies any unilateral weakness or facial droop.   MRI was completed in the ED revealing multifocal punctate regions of restricted diffusion. Will admit to Vascular Neurology for stroke work up.     On exam, patient moves all extremities 5/5. Hard to assess visual fields due to vision loss from macular degeneration. Patient stating her vision is not blurrier then normal but daughter stating it is. Patient oriented x3 but slow respond. Dysarthria is present on exam.    Stroke etiology suspected ESUS vs cardioembolic. Pt with severely enlarged LA on echo; will plan for 30-day cardiac event monitor at d/c. Referral to Cardiology for demand ischemia vs NSTEMI evaluation.     Increased pt's BP regimen with good response. Pt to d/c home today per care of family with HH.      Antithrombotics for secondary stroke prevention: Antiplatelets: Aspirin: 81 mg daily  Statins for secondary stroke prevention and hyperlipidemia, if present:   Statins: Atorvastatin- 40 mg daily   Aggressive risk factor modification: HTN, HLD, Diet, Obesity  Rehab efforts: PT/OT/SLP to evaluate and treat - Recommending IPR but family electing for HH  Diagnostics ordered/pending: None  VTE prophylaxis: Hep vte and scds   BP parameters: Infarct: SBP < 140 long-term

## 2018-09-07 NOTE — CONSULTS
"Food & Nutrition  Education    Diet Education:cardiac  Time Spent:15  Learners:pt/ family      Nutrition Education provided with handouts: Cardiac      Comments: pt was asleep, family thanked me for coming by, but laughed and stated "you really think at 84 y/o she is going to change her diet"? States she has had numerous RD/MD/RN tell her about heart healthy diet but she is going to do what she wants. Dietitian's contact information and handouts provided.       Follow-Up: 1x/wk    Please Re-consult as needed        Thanks!      "

## 2018-09-07 NOTE — SUBJECTIVE & OBJECTIVE
Interval History 9/7/2018:  Patient is seen for follow-up rehab evaluation and recommendations: No acute events over night. Participating with therapy.     HPI, Past Medical, Family, and Social History remains the same as documented in the initial encounter.    Scheduled Medications:    amLODIPine  5 mg Oral Daily    aspirin  81 mg Oral QHS    atorvastatin  40 mg Oral Daily    heparin (porcine)  5,000 Units Subcutaneous Q8H    lisinopril  10 mg Oral Daily    sodium chloride 0.9%  3 mL Intravenous Q8H     Diagnostic Results:   Labs: Reviewed  CT: Reviewed  EEG: Reviewed  Echo: Reviewed     PRN Medications: acetaminophen, labetalol, ondansetron, sodium chloride 0.9%    Review of Systems   Constitutional: Negative for fatigue and fever.   HENT: Negative for sore throat and trouble swallowing.    Eyes: Negative for visual disturbance.   Respiratory: Negative for cough and shortness of breath.    Cardiovascular: Negative for chest pain and leg swelling.   Gastrointestinal: Negative for abdominal distention and abdominal pain.   Genitourinary: Negative for difficulty urinating.   Musculoskeletal: Positive for gait problem. Negative for back pain.   Skin: Negative for color change.   Neurological: Negative for dizziness, light-headedness and headaches.   Psychiatric/Behavioral: Positive for confusion. Negative for agitation.     Objective:     Vital Signs (Most Recent):  Temp: 98.9 °F (37.2 °C) (09/07/18 0441)  Pulse: 71 (09/07/18 0441)  Resp: 17 (09/07/18 0441)  BP: (!) 199/82 (09/07/18 0441)  SpO2: (!) 91 % (09/07/18 0441)    Vital Signs (24h Range):  Temp:  [98.3 °F (36.8 °C)-98.9 °F (37.2 °C)] 98.9 °F (37.2 °C)  Pulse:  [66-83] 71  Resp:  [16-20] 17  SpO2:  [90 %-95 %] 91 %  BP: (128-199)/(52-88) 199/82     Physical Exam   Constitutional: She appears well-developed and well-nourished.   Eyes: EOM are normal. Pupils are equal, round, and reactive to light.   Neck: Normal range of motion. Neck supple.    Cardiovascular: Normal rate and regular rhythm.   Pulmonary/Chest: Effort normal and breath sounds normal.   Abdominal: Soft. Normal appearance and bowel sounds are normal. She exhibits no abdominal bruit. There is no rigidity.   Musculoskeletal: Normal range of motion.   Neurological: She is alert.   -  Mental Status: AAOx3. Follows commands.   -  Speech and language: dysarthria present.    -  Vision: difficult with macular degeneration    -  Facial movement (CN VII): symmetrical  -  Motor: RUE: 4/5, 4/5 .  LUE: 4/5, 4/5 . RLE: 4/5, DF 4/5, PF 4/5.  LLE: 4/5, DF 4/5, PF 4/5. Weakness present.   -  Sensory:  Intact to light touch    Skin: Skin is warm and dry.   Psychiatric: She has a normal mood and affect.     NEUROLOGICAL EXAMINATION:     CRANIAL NERVES     CN III, IV, VI   Pupils are equal, round, and reactive to light.  Extraocular motions are normal.

## 2018-09-07 NOTE — NURSING
Patient received discharge orders. Discharge isntructions reviewed with family and patient. Family demonstrated understanding of discharge instructions. IV and tele removed. Patient waiting on transport to be discharged home with family. Will continue to monitor.

## 2018-09-07 NOTE — SUBJECTIVE & OBJECTIVE
Neurologic Chief Complaint: multifocal punctate strokes     Subjective:     Interval History: Patient is seen for follow-up neurological assessment and treatment recommendations: NAEON. Increased Lisinopril, added Coreg with good BP response. EEG negative; no further visual hallucinations today. D/c home today per care of family with HH.    HPI, Past Medical, Family, and Social History remains the same as documented in the initial encounter.     Review of Systems   Constitutional: Negative for chills and fever.   Eyes: Positive for visual disturbance. Negative for discharge.   Gastrointestinal: Positive for nausea. Negative for vomiting.   Neurological: Positive for speech difficulty. Negative for facial asymmetry, weakness and numbness.   Psychiatric/Behavioral: Negative for agitation and behavioral problems.     Scheduled Meds:   amLODIPine  5 mg Oral Daily    aspirin  81 mg Oral QHS    atorvastatin  40 mg Oral Daily    carvedilol  6.25 mg Oral BID    heparin (porcine)  5,000 Units Subcutaneous Q8H    lisinopril  20 mg Oral Daily    sodium chloride 0.9%  3 mL Intravenous Q8H     Continuous Infusions:   sodium chloride 0.9%       PRN Meds:acetaminophen, labetalol, ondansetron, sodium chloride 0.9%    Objective:     Vital Signs (Most Recent):  Temp: 99.2 °F (37.3 °C) (09/07/18 1714)  Pulse: 67 (09/07/18 1714)  Resp: (!) 22 (09/07/18 1714)  BP: (!) 136/54 (09/07/18 1714)  SpO2: 95 % (09/07/18 1714)  BP Location: Right arm    Vital Signs Range (Last 24H):  Temp:  [98.3 °F (36.8 °C)-99.2 °F (37.3 °C)]   Pulse:  [65-83]   Resp:  [16-22]   BP: (128-199)/(52-88)   SpO2:  [90 %-95 %]   BP Location: Right arm    Physical Exam   Constitutional: She is oriented to person, place, and time. She appears well-developed and well-nourished. No distress.   HENT:   Head: Normocephalic and atraumatic.   Eyes: Conjunctivae and EOM are normal.   Cardiovascular: Normal rate.   Pulmonary/Chest: Effort normal. No respiratory  distress.   Musculoskeletal: Normal range of motion. She exhibits no edema.   Neurological: She is alert and oriented to person, place, and time. No sensory deficit. She exhibits normal muscle tone.   Skin: Skin is warm and dry.   Psychiatric: She has a normal mood and affect. Her behavior is normal.   Nursing note and vitals reviewed.      Neurological Exam:   LOC: alert  Attention Span: Good   Language: No aphasia  Articulation: Dysarthria  Orientation: Person, Place, Time   Visual Fields: vision deficit present due to macular degeneration  EOM (CN III, IV, VI): Full/intact  Facial Movement (CN VII): Symmetric facial expression    Motor: Arm left  Normal 5/5  Leg left  Normal 5/5  Arm right  Normal 5/5  Leg right Normal 5/5  Sensation: Intact to light touch, temperature and vibration  Tone: Normal tone throughout    Laboratory:  CMP:   Recent Labs   Lab  09/07/18   0424   CALCIUM  8.6*   ALBUMIN  2.3*   PROT  6.1   NA  139   K  4.0   CO2  25   CL  109   BUN  23   CREATININE  1.2   ALKPHOS  114   ALT  19   AST  47*   BILITOT  0.9     CBC:   Recent Labs   Lab  09/07/18   0424   WBC  7.69   RBC  3.61*   HGB  10.7*   HCT  32.8*   PLT  160   MCV  91   MCH  29.6   MCHC  32.6     Lipid Panel:   Recent Labs   Lab  09/05/18   1442   CHOL  164   LDLCALC  100.6   HDL  37*   TRIG  132     Coagulation:   Recent Labs   Lab  09/06/18   0452   INR  1.1   APTT  24.0     Platelet Aggregation Study: No results for input(s): PLTAGG, PLTAGINTERP, PLTAGREGLACO, ADPPLTAGGREG in the last 168 hours.  Hgb A1C: No results for input(s): HGBA1C in the last 168 hours.  TSH:   Recent Labs   Lab  09/05/18   1442   TSH  4.290*       Diagnostic Results     Brain Imaging     CT Head 9/6/18  Examination mildly degraded by patient motion artifact.  Known small multifocal areas of recent infarction are better delineated on recent MRI of 09/05/2018.  No new hemorrhage or major vascular distribution infarct.  Mild chronic microvascular ischemic change  throughout the supratentorial white matter.    9/5/18 - MRI   1. Multifocal punctate regions of restricted diffusion involving the left basal ganglia, left parietal region, left posterior corpus callosum, bilateral occipital lobes, and scattered within the cerebellum, concerning for multifocal punctate infarcts, likely of embolic source.  Is a focus of hemorrhage hemorrhage/hemosiderin deposition within the left basal ganglia, likely with associated infarct.  2. Sequela of chronic microvascular ischemic change noting remote appearing infarcts involving the cerebellum and likely cerebellar vermis.  3. Small amount of fluid within the left mastoid air cells.    9/5/18 - CTA   CT brain:  Previously identified areas of scattered acute punctate infarction present however not as well characterized as when compared with same-date MRI.  No new intraparenchymal hemorrhage or major vascular distribution infarct.  Mild generalized cerebral volume loss and chronic microvascular ischemic change.  CTA head/neck:  Significant mixed calcific/noncalcific atherosclerotic change at bilateral carotid bifurcations with approximately 50% stenosis of the proximal right internal carotid artery per NASCET criteria.  Bilateral vertebral artery origins demonstrate mild narrowing, left greater than right, with the left vertebral origin demonstrating significant tortuosity.  Mild atherosclerotic change of the left V4 with minimal associated narrowing.  No significant focal stenosis, focal occlusion, or intracranial aneurysm formation of the intracranial vasculature.  Advanced degenerative changes of the cervical spine at the C5-C6 level.  Additional details above.      Echo w/ CFD 9/6/18  CONCLUSIONS     1 - Hyperdynamic left ventricular systolic function (EF >70%).     2 - Normal right ventricular systolic function .     3 - Severe left atrial enlargement.     4 - Mild aortic stenosis, CROW = 1.71 cm2, AVAi = 1.01 cm2/m2, peak velocity = 2.9  m/s, mean gradient = 15 mmHg.

## 2018-09-07 NOTE — ASSESSMENT & PLAN NOTE
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

## 2018-09-07 NOTE — PROGRESS NOTES
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 Bed mobility modA- CGA. Sit to stand foreign-HHA. Ambulated 42 ft Foreign w/ RW. UBD maxA. LBDmodA. SLP recommendation: regular diet and thin liquids. SLP diagnosis mild dysarthria and CLI.     Interval History 9/7/2018:  Patient is seen for follow-up rehab evaluation and recommendations: No acute events over night. Participating with therapy.     HPI, Past Medical, Family, and Social History remains the same as documented in the initial encounter.    Scheduled Medications:    amLODIPine  5 mg Oral Daily    aspirin  81 mg Oral QHS    atorvastatin  40 mg Oral Daily    heparin (porcine)  5,000 Units Subcutaneous Q8H    lisinopril  10 mg Oral Daily    sodium chloride 0.9%  3 mL Intravenous Q8H     Diagnostic Results:   Labs: Reviewed  CT: Reviewed  EEG: Reviewed  Echo: Reviewed     PRN Medications: acetaminophen, labetalol, ondansetron, sodium chloride 0.9%    Review of Systems   Constitutional: Negative for fatigue and fever.   HENT: Negative for sore throat and trouble swallowing.    Eyes: Negative for visual disturbance.   Respiratory: Negative for cough and shortness of breath.    Cardiovascular: Negative for chest pain and leg swelling.   Gastrointestinal: Negative for abdominal distention and abdominal pain.   Genitourinary: Negative for difficulty urinating.   Musculoskeletal: Positive for gait problem. Negative for back pain.   Skin: Negative for color change.   Neurological: Negative for dizziness, light-headedness and headaches.   Psychiatric/Behavioral: Positive for confusion. Negative for agitation.     Objective:     Vital Signs (Most Recent):  Temp:  98.9 °F (37.2 °C) (09/07/18 0441)  Pulse: 71 (09/07/18 0441)  Resp: 17 (09/07/18 0441)  BP: (!) 199/82 (09/07/18 0441)  SpO2: (!) 91 % (09/07/18 0441)    Vital Signs (24h Range):  Temp:  [98.3 °F (36.8 °C)-98.9 °F (37.2 °C)] 98.9 °F (37.2 °C)  Pulse:  [66-83] 71  Resp:  [16-20] 17  SpO2:  [90 %-95 %] 91 %  BP: (128-199)/(52-88) 199/82     Physical Exam   Constitutional: She appears well-developed and well-nourished.   Eyes: EOM are normal. Pupils are equal, round, and reactive to light.   Neck: Normal range of motion. Neck supple.   Cardiovascular: Normal rate and regular rhythm.   Pulmonary/Chest: Effort normal and breath sounds normal.   Abdominal: Soft. Normal appearance and bowel sounds are normal. She exhibits no abdominal bruit. There is no rigidity.   Musculoskeletal: Normal range of motion.   Neurological: She is alert.   -  Mental Status: Awake and alert. Follows commands. Slow to answer questions.   -  Speech and language: dysarthria present.    -  Vision: difficult with macular degeneration    -  Facial movement (CN VII): symmetrical  -  Motor: RUE: 4/5, 4/5 .  LUE: 4/5, 4/5 . RLE: 4/5, DF 4/5, PF 4/5.  LLE: 4/5, DF 4/5, PF 4/5. Weakness present.   -  Sensory:  Intact to light touch    Skin: Skin is warm and dry.   Psychiatric: She has a normal mood and affect.     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      Recommendations  -  Encourage mobility, OOB in chair at least 3 hours per day, and early ambulation as appropriate   -  PT/OT evaluate and treat  -  SLP speech and cognitive evaluate and treat  -  Monitor sleep disturbances and establish consistent sleep-wake cycle  -  Monitor for bowel and bladder dysfunction  -  Monitor for shoulder pain and subluxation  -  Monitor for spasticity  -  Monitor for and prevent skin breakdown and pressure ulcers  · Early  mobility, repositioning/weight shifting every 20-30 minutes when sitting, turn patient every 2 hours, proper mattress/overlay and chair cushioning, pressure relief/heel protector boots  -  DVT prophylaxis  -  Reviewed discharge options (IP rehab, SNF, HH therapy, and OP therapy)        Elevated INR    - Cardiology following, now normalized        Mixed hyperlipidemia    - Stroke risk factor        Essential hypertension    - Stroke risk factor        Elevated troponin    - Cardiology following and trending           Recommend Inpatient Rehab. Family wanting home health and pt lives with daughter and will have 24/7 support.     Alma Meza NP  Department of Physical Medicine & Rehab   [facility]

## 2018-09-07 NOTE — PROGRESS NOTES
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 Her daughter is at the bedside and states patient has baseline vision loss from macular degeneration but is able to see peripherally. She noticed over the past few days her mom has been slower to speak and her speech had become more slurred. Patient states since yesterday she feels like she is leaning towards one side when she walks. She denies any unilateral weakness or facial droop.   MRI was completed in the ED revealing multifocal punctate regions of restricted diffusion. Will admit to Vascular Neurology for stroke work up.     On exam, patient moves all extremities 5/5. Hard to assess visual fields due to vision loss from macular degeneration. Patient stating her vision is not blurrier then normal but daughter stating it is. Patient oriented x3 but slow respond. Dysarthria is present on exam.    Stroke etiology suspected ESUS vs cardioembolic. Pt with severely enlarged LA on echo; will plan for 30-day cardiac event monitor at d/c. Referral to Cardiology for demand ischemia vs NSTEMI evaluation.     Increased pt's BP regimen with good response. Pt to d/c home today per care of family with HH.      Antithrombotics for secondary stroke prevention: Antiplatelets: Aspirin: 81 mg daily  Statins for secondary stroke prevention and hyperlipidemia, if present:   Statins: Atorvastatin- 40 mg daily   Aggressive risk factor modification: HTN, HLD, Diet, Obesity  Rehab efforts: PT/OT/SLP to evaluate and treat - Recommending IPR but family electing for HH  Diagnostics ordered/pending: None  VTE prophylaxis: Hep vte and scds   BP parameters: Infarct: SBP < 140 long-term         Visual hallucination    Family reporting pt seeing things in room that weren't there yesterday; Family also reports progressive cognitive decline in recent weeks/months  Repeat CTH, EEG, UA negative for a source  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 in the setting of hospitalization and acute stroke  Pt with no further visual hallucinations today, oriented x3  Will d/c home with plan for close PCP follow-up of mental state        Asymptomatic stenosis of right carotid artery    50% stenosis on CTA, not related to cause of stroke   ASA, statin, outpatient monitoring         Elevated troponin    Cardiology consulted in ED - see recs, Appreciate assistance  - Suspect demand ischemia 2/2 long-standing HTN; Recommended not trending troponins any further  Added Coreg per Cards recs  Ambulatory referral to Cardiology at d/c        Mixed hyperlipidemia    Stroke risk factor    Atorvastatin 40 mg   Mildly elevated LFTs; Trending down appropriately        Essential hypertension    Stroke risk factor   SBP < 140 long-term  Restarted amlodipine and lisinopril, and added Coreg per Cardiology recs  BP with good response to therapies; Will d/c with plan for PCP follow-up             85 year old female with slurred speech, and worsening of baseline vision (history of macular degeneration) - MRI with scattered punctate areas of restricted diffusion, echo pending today.     9/6/18 - patient reports no complaints today - pending echo, pending rehab recommendations   9/7: Increased Lisinopril, added Coreg with good BP response. EEG negative; no further visual hallucinations today. D/c home today per care of family with HH.    STROKE DOCUMENTATION        NIH Scale:  1a. Level Of Consciousness: 0-->Alert: keenly responsive  1b. LOC Questions: 0-->Answers both questions correctly  1c. LOC Commands: 0-->Performs both tasks correctly  2. Best Gaze: 0-->Normal  3. Visual: 1-->Partial hemianopia  4.  Facial Palsy: 0-->Normal symmetrical movements  5a. Motor Arm, Left: 0-->No drift: limb holds 90 (or 45) degrees for full 10 secs  5b. Motor Arm, Right: 0-->No drift: limb holds 90 (or 45) degrees for full 10 secs  6a. Motor Leg, Left: 0-->No drift: leg holds 30 degree position for full 5 secs  6b. Motor Leg, Right: 0-->No drift: leg holds 30 degree position for full 5 secs  7. Limb Ataxia: 0-->Absent  8. Sensory: 0-->Normal: no sensory loss  9. Best Language: 0-->No aphasia: normal  10. Dysarthria: 1-->Mild-to-moderate dysarthria: patient slurs at least some words and, at worst, can be understood with some difficulty  11. Extinction and Inattention (formerly Neglect): 0-->No abnormality  Total (NIH Stroke Scale): 2       Modified Desire Score: 0  [place] Coma Scale:    ABCD2 Score:    [id number] Score:   HAS -BLED Score:   ICH Score:   Hunt & Pandya Classification:      Hemorrhagic change of an Ischemic Stroke: Does this patient have an ischemic stroke with hemorrhagic changes? No     Neurologic Chief Complaint: multifocal punctate strokes     Subjective:     Interval History: Patient is seen for follow-up neurological assessment and treatment recommendations: JEWEL. Increased Lisinopril, added Coreg with good BP response. EEG negative; no further visual hallucinations today. D/c home today per care of family with HH.    HPI, Past Medical, Family, and Social History remains the same as documented in the initial encounter.     Review of Systems   Constitutional: Negative for chills and fever.   Eyes: Positive for visual disturbance. Negative for discharge.   Gastrointestinal: Positive for nausea. Negative for vomiting.   Neurological: Positive for speech difficulty. Negative for facial asymmetry, weakness and numbness.   Psychiatric/Behavioral: Negative for agitation and behavioral problems.     Scheduled Meds:   amLODIPine  5 mg Oral Daily    aspirin  81 mg Oral QHS    atorvastatin  40 mg Oral Daily     carvedilol  6.25 mg Oral BID    heparin (porcine)  5,000 Units Subcutaneous Q8H    lisinopril  20 mg Oral Daily    sodium chloride 0.9%  3 mL Intravenous Q8H     Continuous Infusions:   sodium chloride 0.9%       PRN Meds:acetaminophen, labetalol, ondansetron, sodium chloride 0.9%    Objective:     Vital Signs (Most Recent):  Temp: 99.2 °F (37.3 °C) (09/07/18 1714)  Pulse: 67 (09/07/18 1714)  Resp: (!) 22 (09/07/18 1714)  BP: (!) 136/54 (09/07/18 1714)  SpO2: 95 % (09/07/18 1714)  BP Location: Right arm    Vital Signs Range (Last 24H):  Temp:  [98.3 °F (36.8 °C)-99.2 °F (37.3 °C)]   Pulse:  [65-83]   Resp:  [16-22]   BP: (128-199)/(52-88)   SpO2:  [90 %-95 %]   BP Location: Right arm    Physical Exam   Constitutional: She is oriented to person, place, and time. She appears well-developed and well-nourished. No distress.   HENT:   Head: Normocephalic and atraumatic.   Eyes: Conjunctivae and EOM are normal.   Cardiovascular: Normal rate.   Pulmonary/Chest: Effort normal. No respiratory distress.   Musculoskeletal: Normal range of motion. She exhibits no edema.   Neurological: She is alert and oriented to person, place, and time. No sensory deficit. She exhibits normal muscle tone.   Skin: Skin is warm and dry.   Psychiatric: She has a normal mood and affect. Her behavior is normal.   Nursing note and vitals reviewed.      Neurological Exam:   LOC: alert  Attention Span: Good   Language: No aphasia  Articulation: Dysarthria  Orientation: Person, Place, Time   Visual Fields: vision deficit present due to macular degeneration  EOM (CN III, IV, VI): Full/intact  Facial Movement (CN VII): Symmetric facial expression    Motor: Arm left  Normal 5/5  Leg left  Normal 5/5  Arm right  Normal 5/5  Leg right Normal 5/5  Sensation: Intact to light touch, temperature and vibration  Tone: Normal tone throughout    Laboratory:  CMP:   Recent Labs   Lab  09/07/18   0424   CALCIUM  8.6*   ALBUMIN  2.3*   PROT  6.1   NA  139   K   4.0   CO2  25   CL  109   BUN  23   CREATININE  1.2   ALKPHOS  114   ALT  19   AST  47*   BILITOT  0.9     CBC:   Recent Labs   Lab  09/07/18   0424   WBC  7.69   RBC  3.61*   HGB  10.7*   HCT  32.8*   PLT  160   MCV  91   MCH  29.6   MCHC  32.6     Lipid Panel:   Recent Labs   Lab  09/05/18   1442   CHOL  164   LDLCALC  100.6   HDL  37*   TRIG  132     Coagulation:   Recent Labs   Lab  09/06/18   0452   INR  1.1   APTT  24.0     Platelet Aggregation Study: No results for input(s): PLTAGG, PLTAGINTERP, PLTAGREGLACO, ADPPLTAGGREG in the last 168 hours.  Hgb A1C: No results for input(s): HGBA1C in the last 168 hours.  TSH:   Recent Labs   Lab  09/05/18   1442   TSH  4.290*       Diagnostic Results     Brain Imaging     CT Head 9/6/18  Examination mildly degraded by patient motion artifact.  Known small multifocal areas of recent infarction are better delineated on recent MRI of 09/05/2018.  No new hemorrhage or major vascular distribution infarct.  Mild chronic microvascular ischemic change throughout the supratentorial white matter.    9/5/18 - MRI   1. Multifocal punctate regions of restricted diffusion involving the left basal ganglia, left parietal region, left posterior corpus callosum, bilateral occipital lobes, and scattered within the cerebellum, concerning for multifocal punctate infarcts, likely of embolic source.  Is a focus of hemorrhage hemorrhage/hemosiderin deposition within the left basal ganglia, likely with associated infarct.  2. Sequela of chronic microvascular ischemic change noting remote appearing infarcts involving the cerebellum and likely cerebellar vermis.  3. Small amount of fluid within the left mastoid air cells.    9/5/18 - CTA   CT brain:  Previously identified areas of scattered acute punctate infarction present however not as well characterized as when compared with same-date MRI.  No new intraparenchymal hemorrhage or major vascular distribution infarct.  Mild generalized cerebral  volume loss and chronic microvascular ischemic change.  CTA head/neck:  Significant mixed calcific/noncalcific atherosclerotic change at bilateral carotid bifurcations with approximately 50% stenosis of the proximal right internal carotid artery per NASCET criteria.  Bilateral vertebral artery origins demonstrate mild narrowing, left greater than right, with the left vertebral origin demonstrating significant tortuosity.  Mild atherosclerotic change of the left V4 with minimal associated narrowing.  No significant focal stenosis, focal occlusion, or intracranial aneurysm formation of the intracranial vasculature.  Advanced degenerative changes of the cervical spine at the C5-C6 level.  Additional details above.      Echo w/ CFD 9/6/18  CONCLUSIONS     1 - Hyperdynamic left ventricular systolic function (EF >70%).     2 - Normal right ventricular systolic function .     3 - Severe left atrial enlargement.     4 - Mild aortic stenosis, CROW = 1.71 cm2, AVAi = 1.01 cm2/m2, peak velocity = 2.9 m/s, mean gradient = 15 mmHg.       Magdalena Zepeda PA-C  Comprehensive Stroke Center  Department of Vascular Neurology   [facility]

## 2018-09-07 NOTE — PLAN OF CARE
Patient to discharge home today with [facility]. Patient and family in agreement with discharge plan. Family to provide transportation home.       09/07/18 1756   Final Note   Assessment Type Final Discharge Note   Discharge Disposition Home-Health  ([facility] )   Right Care Referral Info   Post Acute Recommendation Home-care

## 2018-09-07 NOTE — PLAN OF CARE
CM spoke to patient and family in reference to discharge plan. PT recs are rehab, however, patient and family have decided that they do not want her to go to rehab. They state that patient lives with her daughter Radha and pt also has help from her 2 adult granddaughters as well. All family members were in the room and are all in agreement with patient going home in their care with HH. HH list provided and choices are [facility] and [facility]. SW updated on HH choices. CM will continue to follow.

## 2018-09-07 NOTE — ASSESSMENT & PLAN NOTE
Cardiology consulted in ED - see recs, Appreciate assistance  - Suspect demand ischemia 2/2 long-standing HTN; Recommended not trending troponins any further  Added Coreg per Cards recs  Ambulatory referral to Cardiology at d/c

## 2018-09-07 NOTE — PLAN OF CARE
MALIKA sent home health referral to [facility].     Mi Garza, LACY  [facility]  Ext. [phone]

## 2018-09-07 NOTE — ASSESSMENT & PLAN NOTE
Stroke risk factor   SBP < 140 long-term  Restarted amlodipine and lisinopril, and added Coreg per Cardiology recs  BP with good response to therapies; Will d/c with plan for PCP follow-up

## 2018-09-07 NOTE — PLAN OF CARE
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       No future appointments.  Follow-up Information     [facility] In 6 weeks.    Specialty:  Vascular Neurology  Why:  Someone from our office will call you to set up a hospital follow-up appt within 1 week. In case no one calls within 1 week, call number above to schedule an appt.  Contact information:  [address]  [facility] [address]  [phone]           Marta Michael MD In 1 week.    Specialty:  Internal Medicine  Why:  Call your PCP to set up a hospital follow-up appt within 1 week of discharge  Contact information:  [address]  [facility] [address]  [phone]                     I have seen and examined this patient face to face today. My clinical findings that support the need for the home health skilled services and home bound status are the following:  Weakness/numbness causing balance and gait disturbance due to Stroke making it taxing to leave home.  Requiring assistive device to leave home due to unsteady gait caused by  Stroke.  Medical restrictions requiring assistance of another human to leave home due to  Unstable ambulation and Visual deficits.    Allergies:Review of patient's allergies indicates:  No Known  Allergies    Diet: cardiac diet and fluid consistency thin    Activities: activity as tolerated    Nursing:   SN to complete comprehensive assessment including routine vital signs. Instruct on disease process and s/s of complications to report to MD. Review/verify medication list sent home with the patient at time of discharge  and instruct patient/caregiver as needed. Frequency may be adjusted depending on start of care date.    Notify MD if SBP > 160 or < 90; DBP > 90 or < 50; HR > 120 or < 50; Temp > 101; Other:   New neurologic symptoms      CONSULTS:    Physical Therapy to evaluate and treat. Evaluate for home safety and equipment needs; Establish/upgrade home exercise program. Perform / instruct on therapeutic exercises, gait training, transfer training, and Range of Motion.  Occupational Therapy to evaluate and treat. Evaluate home environment for safety and equipment needs. Perform/Instruct on transfers, ADL training, ROM, and therapeutic exercises.  Speech Therapy  to evaluate and treat for  Language and Cognition.   to evaluate for community resources/long-range planning.    MISCELLANEOUS CARE:  N/A    WOUND CARE ORDERS  n/a      Medications: Review discharge medications with patient and family and provide education.      Current Discharge Medication List      START taking these medications    Details   amLODIPine (NORVASC) 5 MG tablet Take 1 tablet (5 mg total) by mouth once daily.  Qty: 30 tablet, Refills: 11      atorvastatin (LIPITOR) 40 MG tablet Take 1 tablet (40 mg total) by mouth once daily.  Qty: 90 tablet, Refills: 3      carvedilol (COREG) 6.25 MG tablet Take 1 tablet (6.25 mg total) by mouth 2 (two) times daily.  Qty: 60 tablet, Refills: 11      lisinopril (PRINIVIL,ZESTRIL) 20 MG tablet Take 1 tablet (20 mg total) by mouth once daily.  Qty: 90 tablet, Refills: 3         CONTINUE these medications which have NOT CHANGED    Details   aspirin (ECOTRIN) 81 MG EC tablet Take 81 mg by  mouth every evening.       BETA-CAROTENE,A, W-C & E/MIN (ICAPS ORAL) Take 1 tablet by mouth once daily.      fish oil-omega-3 fatty acids 300-1,000 mg capsule Take 1 capsule by mouth once daily.              I certify that this patient is confined to her home and needs physical therapy, speech therapy and occupational therapy.
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
## 2018-09-08 PROBLEM — S82.852A LEFT TRIMALLEOLAR FRACTURE, CLOSED, INITIAL ENCOUNTER: Status: ACTIVE | Noted: 2018-01-01

## 2018-09-08 PROBLEM — S82.852A LEFT TRIMALLEOLAR FRACTURE, CLOSED, INITIAL ENCOUNTER: Status: RESOLVED | Noted: 2018-01-01 | Resolved: 2018-01-01

## 2018-09-08 PROBLEM — S82.852A TRIMALLEOLAR FRACTURE OF LEFT ANKLE: Status: ACTIVE | Noted: 2018-01-01

## 2018-09-08 PROBLEM — S82.62XA CLOSED DISPLACED FRACTURE OF LATERAL MALLEOLUS OF LEFT FIBULA: Status: ACTIVE | Noted: 2018-01-01

## 2018-09-08 NOTE — ASSESSMENT & PLAN NOTE
Closed displaced fracture of L lateral malleolus  S/p mechanical fall at home  - Discharged from [facility] yesterday after admission for CVA (9/5-9/7). During that admission, recommendations were for patient to go to inpatient rehab; family opted for patient to return home with [facility] and [facility]  - Mechanical fall while attempting to go to bathroom without assistance after arriving home  - HDS on admission  - Imaging revealed displaced fracture of L lateral malleolus with anterior and medial dislocation of the tibia  - Ortho surgery reduced and splinted in the ER; rec NWB LLE; plan for outpatient surgery  - Pain controlled on admit, cautious use of opioids in elderly, analgesics PRN  - Patient admitted for rehab placement  - Family in agreement with patient going to Rehab for continued therapy. Family would like [facility]. CM spoke with [facility]; stated they are unable to take today 2/2 already have 3 admissions but should be able to take tomorrow, 9/9  - PT/OT/SLP consulted  - Discharge pending rehab placement

## 2018-09-08 NOTE — CONSULTS
[facility]  Orthopedics  Consult Note    Patient Name: Mitzy Perez  MRN: [id number]  Admission Date: 9/8/2018  Hospital Length of Stay: 0 days  Attending Provider: Karyna Vaughn MD  Primary Care Provider: Marta Michael MD    Inpatient consult to Orthopedic Surgery  Consult performed by: Hermes Sun MD  Consult ordered by: Erika Saldana PA-C        Subjective:     Principal Problem:Fall    Chief Complaint:   Chief Complaint   Patient presents with    Ankle Injury     pt complains of fall and twisting her left ankle x 30 mins ago  and now complains of pain 5/10. pt is aox 4. pt denies chest pain sob. pt has edema noted to the left ankle.         HPI: Mitzy Perez is an 85 y.o. female  presenting with left ankle pain after mechanical fall this morning. Patient was walking to the bathroom, she tripped and landed awkwardly on her left leg. She had Immediate pain and difficulty bearing weight. Denies head injury or LOC. Of note, had suspected embolic stroke last week and discharged 2 days ago. Was discharged on aspirin and statin.    Denies other MSK pains.   Denies numbness, tingling, or paresthesias to extremity.  Ambulates with significant difficulty at baseline secondary to significant vision loss.          ROS: denies chest pain, shortness of breath, nausea, vomiting, numbness or tingling of extremities    Review of patient's allergies indicates:  No Known Allergies    Current Facility-Administered Medications   Medication    acetaminophen tablet 650 mg    amLODIPine tablet 5 mg    aspirin EC tablet 81 mg    atorvastatin tablet 40 mg    carvedilol tablet 6.25 mg    enoxaparin injection 40 mg    lisinopril tablet 20 mg    ondansetron disintegrating tablet 8 mg    ondansetron injection 4 mg    sodium chloride 0.9% flush 3 mL     Objective:     Vital Signs (Most Recent):  Temp: 97.7 °F (36.5 °C) (09/08/18 0806)  Pulse: 71 (09/08/18 0806)  Resp: 16 (09/08/18  "0806)  BP: (!) 189/72 (18)  SpO2: (!) 91 % (18) Vital Signs (24h Range):  Temp:  [97.7 °F (36.5 °C)-99.2 °F (37.3 °C)] 97.7 °F (36.5 °C)  Pulse:  [65-72] 71  Resp:  [16-22] 16  SpO2:  [91 %-96 %] 91 %  BP: (136-189)/(54-82) 189/72        Height: 4' 9" (144.8 cm)  Body mass index is 37.31 kg/m².    No intake or output data in the 24 hours ending 18 09    Ortho/SPM Exam  Vitals: Afebrile. Vital signs stable.  General: No acute distress.  HEENT: Normocephalic. Atraumatic. Sclera anicteric. No tracheal deviation.  Cardio: Regular rate.  Chest: No increased work of breathing.  Abdominal: Nondistended.  Extremities: No cyanosis.  No clubbing.  No edema.  Palpable pulses.  Skin: No generalized rash.  Neuro: Awake. Alert. Oriented to person, place, time, and situation.  Psych: Normal appearance. Cooperative.  Appropriate mood.  Appropriate affect.      LLE:  Skin intact throughout  Angular deformity of ankle  No tenderness to palpation of proximal, middle, or distal aspects of femur  No tenderness to palpation of proximal or middle aspects tibia or fibula  No tenderness to palpation of foot  Tenderness to palpation around medial and lateral malleoli  Compartments soft  Painless ROM of hip and knee  SILT Sa/Larsen/DP/SP/T  Motor intact EHL/FHL/TA/Gastroc  2+ DP      Significant Imaging:   X-ray left ankle, tibfib, knee: trimal ankle fracture with dislocation    Assessment/Plan:     Trimalleolar fracture of left ankle    Mitzy Perez is a 85 y.o. female with left trimal ankle fracture  - Reduced and splinted in ER  - Typically this fracture is treated operatively, however, given recent stroke and advanced age, will consider further  - NWB LLE  - PT/OT ordered  - Ice/elevation LLE    Procedure Note: left ankle reduction  Patient was explained risks, benefits, and alternatives to treatment and verbalized consent to proceed. Time out was performed and patient name, , site, and procedure were " confirmed. 10 cc of 1% lidocaine was used for hematoma block and additional 10 cc of lidocaine was injected intra-articularly. Fracture was reduced under fluoroscopy. Short leg splint with stirrups was applied in typical fashion. Post-reduction films were performed and confirmed adequate reduction.            Hermes Sun MD  Orthopedics  [facility]

## 2018-09-08 NOTE — ASSESSMENT & PLAN NOTE
Cardiology consulted in ED - Appreciate assistance  - Suspect demand ischemia 2/2 long-standing HTN though concern for NSTEMI  Pt was w/o sx; Team recommended not trending troponins any further  Added Coreg per Cards recs (+amlodipine, lisinopril)  Ambulatory referral to Cardiology at d/c

## 2018-09-08 NOTE — SUBJECTIVE & OBJECTIVE
"  ROS: denies chest pain, shortness of breath, nausea, vomiting, numbness or tingling of extremities    Review of patient's allergies indicates:  No Known Allergies    Current Facility-Administered Medications   Medication    acetaminophen tablet 650 mg    amLODIPine tablet 5 mg    aspirin EC tablet 81 mg    atorvastatin tablet 40 mg    carvedilol tablet 6.25 mg    enoxaparin injection 40 mg    lisinopril tablet 20 mg    ondansetron disintegrating tablet 8 mg    ondansetron injection 4 mg    sodium chloride 0.9% flush 3 mL     Objective:     Vital Signs (Most Recent):  Temp: 97.7 °F (36.5 °C) (09/08/18 0806)  Pulse: 71 (09/08/18 0806)  Resp: 16 (09/08/18 0806)  BP: (!) 189/72 (09/08/18 0806)  SpO2: (!) 91 % (09/08/18 0806) Vital Signs (24h Range):  Temp:  [97.7 °F (36.5 °C)-99.2 °F (37.3 °C)] 97.7 °F (36.5 °C)  Pulse:  [65-72] 71  Resp:  [16-22] 16  SpO2:  [91 %-96 %] 91 %  BP: (136-189)/(54-82) 189/72        Height: 4' 9" (144.8 cm)  Body mass index is 37.31 kg/m².    No intake or output data in the 24 hours ending 09/08/18 0947    Ortho/SPM Exam  Vitals: Afebrile. Vital signs stable.  General: No acute distress.  HEENT: Normocephalic. Atraumatic. Sclera anicteric. No tracheal deviation.  Cardio: Regular rate.  Chest: No increased work of breathing.  Abdominal: Nondistended.  Extremities: No cyanosis.  No clubbing.  No edema.  Palpable pulses.  Skin: No generalized rash.  Neuro: Awake. Alert. Oriented to person, place, time, and situation.  Psych: Normal appearance. Cooperative.  Appropriate mood.  Appropriate affect.      LLE:  Skin intact throughout  Angular deformity of ankle  No tenderness to palpation of proximal, middle, or distal aspects of femur  No tenderness to palpation of proximal or middle aspects tibia or fibula  No tenderness to palpation of foot  Tenderness to palpation around medial and lateral malleoli  Compartments soft  Painless ROM of hip and knee  SILT Sa/Larsen/DP/SP/T  Motor intact " EHL/FHL/TA/Gastroc  2+ DP      Significant Imaging:   X-ray left ankle, tibfib, knee: trimal ankle fracture with dislocation

## 2018-09-08 NOTE — UM SECONDARY REVIEW
Physician Advisor External    Level of Care Issue    Approved Observation- 09/08/2018 @ 0830- per Dr. Akshat Arrieta, EHR, determination is OBS..

## 2018-09-08 NOTE — ED NOTES
Patient reports fall in bathroom, patient twisted left ankle. Denies hitting head. Denies LOC. Patient's left ankle is swollen and purple. Patient reports pain to inside of ankle. Patient received a heparin shot today. Patient was admitted on Tuesday for a stroke, was discharged on Friday.

## 2018-09-08 NOTE — SUBJECTIVE & OBJECTIVE
Past Medical History:   Diagnosis Date    Arthritis     Cataract     Essential hypertension 9/5/2018    Macular degeneration        Past Surgical History:   Procedure Laterality Date    CATARACT EXTRACTION  2003    LEFT EYE       Review of patient's allergies indicates:  No Known Allergies    Current Facility-Administered Medications on File Prior to Encounter   Medication    [DISCONTINUED] acetaminophen tablet 650 mg    [DISCONTINUED] amLODIPine tablet 5 mg    [DISCONTINUED] aspirin EC tablet 81 mg    [DISCONTINUED] atorvastatin tablet 40 mg    [DISCONTINUED] carvedilol tablet 6.25 mg    [DISCONTINUED] heparin (porcine) injection 5,000 Units    [DISCONTINUED] labetalol injection 10 mg    [DISCONTINUED] lisinopril tablet 20 mg    [DISCONTINUED] ondansetron disintegrating tablet 4 mg    [DISCONTINUED] sodium chloride 0.9% bolus 500 mL    [DISCONTINUED] sodium chloride 0.9% flush 3 mL     Current Outpatient Medications on File Prior to Encounter   Medication Sig    amLODIPine (NORVASC) 5 MG tablet Take 1 tablet (5 mg total) by mouth once daily.    aspirin (ECOTRIN) 81 MG EC tablet Take 81 mg by mouth every evening.     atorvastatin (LIPITOR) 40 MG tablet Take 1 tablet (40 mg total) by mouth once daily.    BETA-CAROTENE,A, W-C & E/MIN (ICAPS ORAL) Take 1 tablet by mouth once daily.    carvedilol (COREG) 6.25 MG tablet Take 1 tablet (6.25 mg total) by mouth 2 (two) times daily.    fish oil-omega-3 fatty acids 300-1,000 mg capsule Take 1 capsule by mouth once daily.     lisinopril (PRINIVIL,ZESTRIL) 20 MG tablet Take 1 tablet (20 mg total) by mouth once daily.     Family History     Problem Relation (Age of Onset)    Retinal detachment Sister    Thyroid disease Sister        Tobacco Use    Smoking status: Never Smoker    Smokeless tobacco: Never Used   Substance and Sexual Activity    Alcohol use: No    Drug use: Not on file    Sexual activity: Not on file     Review of Systems    Constitutional: Negative for chills, diaphoresis, fatigue and fever.   HENT: Negative for congestion, hearing loss, sinus pressure, sore throat and trouble swallowing.    Eyes: Positive for visual disturbance (chronic; no changes).   Respiratory: Negative for cough, chest tightness, shortness of breath and wheezing.    Cardiovascular: Negative for chest pain, palpitations and leg swelling.   Gastrointestinal: Negative for abdominal distention, abdominal pain, diarrhea, nausea and vomiting.   Genitourinary: Negative for difficulty urinating, dysuria, flank pain, frequency and hematuria.   Musculoskeletal: Positive for myalgias. Negative for back pain and gait problem.        L ankle pain   Skin: Negative for rash and wound.   Neurological: Negative for dizziness, seizures, syncope, speech difficulty, light-headedness, numbness and headaches.   Psychiatric/Behavioral: Negative for agitation, confusion, dysphoric mood and hallucinations. The patient is not nervous/anxious.      Objective:     Vital Signs (Most Recent):  Temp: 97.7 °F (36.5 °C) (09/08/18 0806)  Pulse: 71 (09/08/18 0806)  Resp: 16 (09/08/18 0806)  BP: (!) 189/72 (09/08/18 0806)  SpO2: (!) 91 % (09/08/18 0806) Vital Signs (24h Range):  Temp:  [97.7 °F (36.5 °C)-99.2 °F (37.3 °C)] 97.7 °F (36.5 °C)  Pulse:  [65-72] 71  Resp:  [16-22] 16  SpO2:  [91 %-96 %] 91 %  BP: (136-189)/(54-82) 189/72        Body mass index is 37.31 kg/m².    Physical Exam   Constitutional: She is oriented to person, place, and time. She appears well-developed and well-nourished. No distress.   HENT:   Head: Normocephalic and atraumatic.   Eyes: Conjunctivae and EOM are normal. Right eye exhibits no discharge. Left eye exhibits no discharge. No scleral icterus.   Neck: Normal range of motion. Neck supple.   Cardiovascular: Normal rate, regular rhythm, normal heart sounds and intact distal pulses.   Pulmonary/Chest: Effort normal and breath sounds normal. No stridor. No respiratory  distress.   Abdominal: Soft. Bowel sounds are normal. She exhibits no distension. There is no tenderness.   Musculoskeletal:   LLE splinted; CDI; sensation intact; able to move toes   Neurological: She is alert and oriented to person, place, and time. No cranial nerve deficit.   Skin: Skin is warm and dry. She is not diaphoretic. No erythema.   Psychiatric: She has a normal mood and affect. Her behavior is normal.         CRANIAL NERVES     CN III, IV, VI   Extraocular motions are normal.        Significant Labs: All pertinent labs within the past 24 hours have been reviewed.    Significant Imaging: I have reviewed all pertinent imaging results/findings within the past 24 hours.

## 2018-09-08 NOTE — ASSESSMENT & PLAN NOTE
Stroke risk factor    Atorvastatin 40 mg   Mildly elevated LFTs; Trending down appropriately  PCP follow-up

## 2018-09-08 NOTE — ED NOTES
Reduction completed by Ortho Resident. Pt splinted. Pt is neurovascularly intact. Will continue to monitor

## 2018-09-08 NOTE — PLAN OF CARE
CM met with patient's granddaughter, Ashley, at bedside. Family in agreement with patient going to Rehab for continued therapy. Family would like [facility]. CM spoke with Felip[facility]; stated they are unable to take today 2/2 already have 3 admissions but should be able to take tomorrow. Referral sent via RC, consult to PMR also in. Erika MCKENZIE updated.

## 2018-09-08 NOTE — H&P
[facility] Hospital Medicine  History & Physical    Patient Name: Mitzy Perez  MRN: [id number]  Admission Date: 9/8/2018  Attending Physician: Karyna Vaughn MD   Primary Care Provider: Marta Michael MD    [facility] Medicine Team: [facility]OSP MED F Erika Saldana PA-C     Patient information was obtained from patient, past medical records and ER records.     Subjective:     Principal Problem:Fall    Chief Complaint:   Chief Complaint   Patient presents with    Ankle Injury     pt complains of fall and twisting her left ankle x 30 mins ago  and now complains of pain 5/10. pt is aox 4. pt denies chest pain sob. pt has edema noted to the left ankle.         HPI: 85 year old female with a PMHx of HTN, macular degeneration, and recent CVA (admitted 9/5-9/7) presenting with L ankle pain s/p mechanical fall at home. Pt was just discharged from [facility] yesterday after admission for CVA. During that admission, recommendations were for patient to go to inpatient rehab; family opted for patient to return home with [facility] and - care. Pt arrived home ~7pm when she went to the bathroom. Per granddaughter, pt did not call for assistance. While in the bathroom, patient slipped while reaching to put toilet seat down. Pt denies prodromal symptoms, including chest pain, SOB, lightheadedness, vision/speech changes. She denies LOC and head injury. Family reports they heard her fall and came to her aid immediately. EMS called to transport her to hospital. At the time of my exam, pt only endorses minimal pain to LLE. Denies chest pain, SOB, abdominal pain, N/V/D, fever/chills, vision/speech changes.       In the ED, HDS. Imaging revealed displaced fracture of the lateral malleolus with anterior and medial dislocation of the tibia. Ortho surgery reduced and splinted in the ER; rec NWB LLE; plan for outpatient surgery. Patient admitted for rehab placement.    Past Medical History:   Diagnosis Date    Arthritis      Cataract     Essential hypertension 9/5/2018    Macular degeneration        Past Surgical History:   Procedure Laterality Date    CATARACT EXTRACTION  2003    LEFT EYE       Review of patient's allergies indicates:  No Known Allergies    Current Facility-Administered Medications on File Prior to Encounter   Medication    [DISCONTINUED] acetaminophen tablet 650 mg    [DISCONTINUED] amLODIPine tablet 5 mg    [DISCONTINUED] aspirin EC tablet 81 mg    [DISCONTINUED] atorvastatin tablet 40 mg    [DISCONTINUED] carvedilol tablet 6.25 mg    [DISCONTINUED] heparin (porcine) injection 5,000 Units    [DISCONTINUED] labetalol injection 10 mg    [DISCONTINUED] lisinopril tablet 20 mg    [DISCONTINUED] ondansetron disintegrating tablet 4 mg    [DISCONTINUED] sodium chloride 0.9% bolus 500 mL    [DISCONTINUED] sodium chloride 0.9% flush 3 mL     Current Outpatient Medications on File Prior to Encounter   Medication Sig    amLODIPine (NORVASC) 5 MG tablet Take 1 tablet (5 mg total) by mouth once daily.    aspirin (ECOTRIN) 81 MG EC tablet Take 81 mg by mouth every evening.     atorvastatin (LIPITOR) 40 MG tablet Take 1 tablet (40 mg total) by mouth once daily.    BETA-CAROTENE,A, W-C & E/MIN (ICAPS ORAL) Take 1 tablet by mouth once daily.    carvedilol (COREG) 6.25 MG tablet Take 1 tablet (6.25 mg total) by mouth 2 (two) times daily.    fish oil-omega-3 fatty acids 300-1,000 mg capsule Take 1 capsule by mouth once daily.     lisinopril (PRINIVIL,ZESTRIL) 20 MG tablet Take 1 tablet (20 mg total) by mouth once daily.     Family History     Problem Relation (Age of Onset)    Retinal detachment Sister    Thyroid disease Sister        Tobacco Use    Smoking status: Never Smoker    Smokeless tobacco: Never Used   Substance and Sexual Activity    Alcohol use: No    Drug use: Not on file    Sexual activity: Not on file     Review of Systems   Constitutional: Negative for chills, diaphoresis, fatigue and fever.    HENT: Negative for congestion, hearing loss, sinus pressure, sore throat and trouble swallowing.    Eyes: Positive for visual disturbance (chronic; no changes).   Respiratory: Negative for cough, chest tightness, shortness of breath and wheezing.    Cardiovascular: Negative for chest pain, palpitations and leg swelling.   Gastrointestinal: Negative for abdominal distention, abdominal pain, diarrhea, nausea and vomiting.   Genitourinary: Negative for difficulty urinating, dysuria, flank pain, frequency and hematuria.   Musculoskeletal: Positive for myalgias. Negative for back pain and gait problem.        L ankle pain   Skin: Negative for rash and wound.   Neurological: Negative for dizziness, seizures, syncope, speech difficulty, light-headedness, numbness and headaches.   Psychiatric/Behavioral: Negative for agitation, confusion, dysphoric mood and hallucinations. The patient is not nervous/anxious.      Objective:     Vital Signs (Most Recent):  Temp: 97.7 °F (36.5 °C) (09/08/18 0806)  Pulse: 71 (09/08/18 0806)  Resp: 16 (09/08/18 0806)  BP: (!) 189/72 (09/08/18 0806)  SpO2: (!) 91 % (09/08/18 0806) Vital Signs (24h Range):  Temp:  [97.7 °F (36.5 °C)-99.2 °F (37.3 °C)] 97.7 °F (36.5 °C)  Pulse:  [65-72] 71  Resp:  [16-22] 16  SpO2:  [91 %-96 %] 91 %  BP: (136-189)/(54-82) 189/72        Body mass index is 37.31 kg/m².    Physical Exam   Constitutional: She is oriented to person, place, and time. She appears well-developed and well-nourished. No distress.   HENT:   Head: Normocephalic and atraumatic.   Eyes: Conjunctivae and EOM are normal. Right eye exhibits no discharge. Left eye exhibits no discharge. No scleral icterus.   Neck: Normal range of motion. Neck supple.   Cardiovascular: Normal rate, regular rhythm, normal heart sounds and intact distal pulses.   Pulmonary/Chest: Effort normal and breath sounds normal. No stridor. No respiratory distress.   Abdominal: Soft. Bowel sounds are normal. She exhibits no  distension. There is no tenderness.   Musculoskeletal:   LLE splinted; CDI; sensation intact; able to move toes   Neurological: She is alert and oriented to person, place, and time. No cranial nerve deficit.   Skin: Skin is warm and dry. She is not diaphoretic. No erythema.   Psychiatric: She has a normal mood and affect. Her behavior is normal.         CRANIAL NERVES     CN III, IV, VI   Extraocular motions are normal.        Significant Labs: All pertinent labs within the past 24 hours have been reviewed.    Significant Imaging: I have reviewed all pertinent imaging results/findings within the past 24 hours.    Assessment/Plan:     * Fall    Closed displaced fracture of L lateral malleolus  S/p mechanical fall at home  - Discharged from [facility] yesterday after admission for CVA (9/5-9/7). During that admission, recommendations were for patient to go to inpatient rehab; family opted for patient to return home with [facility] and [facility]  - Mechanical fall while attempting to go to bathroom without assistance after arriving home  - HDS on admission  - Imaging revealed displaced fracture of L lateral malleolus with anterior and medial dislocation of the tibia  - Ortho surgery reduced and splinted in the ER; rec NWB LLE; plan for outpatient surgery  - Pain controlled on admit, cautious use of opioids in elderly, analgesics PRN  - Patient admitted for rehab placement  - Family in agreement with patient going to Rehab for continued therapy. Family would like [facility]. 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    HLD  - Admitted 9/5-9/7 for acute CVA  - Continue home ASA and lipitor 40 mg daily  - PT/OT/SLP        Essential hypertension    - Elevated on arrival in setting of pain and missed AM meds  - Resume home  regimen: Norvasc 5 mg daily, coreg 6.25 mg BID, lisinopril 20 mg daily  - Continue to monitor          VTE Risk Mitigation (From admission, onward)        Ordered     enoxaparin injection 40 mg  Daily      09/08/18 0515     IP VTE HIGH RISK PATIENT  Once      09/08/18 0515             Erika Saldana PA-C  Department of Hospital Medicine   [facility]  Discussed with staff: Dr. Vaughn

## 2018-09-08 NOTE — ED PROVIDER NOTES
Encounter Date: 9/8/2018    SCRIBE #1 NOTE: I, Magdalena Bradshaw, am scribing for, and in the presence of,  Dr. Jones. I have scribed the following portions of the note - the Resident attestation. Other sections scribed: XR.       History     Chief Complaint   Patient presents with    Ankle Injury     pt complains of fall and twisting her left ankle x 30 mins ago  and now complains of pain 5/10. pt is aox 4. pt denies chest pain sob. pt has edema noted to the left ankle.      HPI     84 yo woman with PMH of CVA, HTN who presents with L ankle pain. Pt was just discharged from here after CVA admission, no residual deficits. Went home around 7pm, was in bathroom when he slipped while reaching to put toilet seat down. Pt reports mechanical fall, denies chest pain, SOB, lightheadedness preceding event. Pt denies LOC, head injury. Family heard her fall, came to her aid immediately. Pt was not moved, did not try to get up, EMS called to transport her to hospital.   No other injury or complaints.    Review of patient's allergies indicates:  No Known Allergies  Past Medical History:   Diagnosis Date    Arthritis     Cataract     Essential hypertension 9/5/2018    Macular degeneration      Past Surgical History:   Procedure Laterality Date    CATARACT EXTRACTION  2003    LEFT EYE     Family History   Problem Relation Age of Onset    Retinal detachment Sister     Thyroid disease Sister     Amblyopia Neg Hx     Blindness Neg Hx     Cancer Neg Hx     Cataracts Neg Hx     Diabetes Neg Hx     Glaucoma Neg Hx     Hypertension Neg Hx     Macular degeneration Neg Hx     Strabismus Neg Hx     Stroke Neg Hx      Social History     Tobacco Use    Smoking status: Never Smoker    Smokeless tobacco: Never Used   Substance Use Topics    Alcohol use: No    Drug use: Not on file     Review of Systems   Constitutional: Negative for chills and fever.   HENT: Negative for sore throat.    Eyes: Negative for visual  disturbance.   Respiratory: Negative for cough and shortness of breath.    Cardiovascular: Negative for chest pain and leg swelling.   Gastrointestinal: Negative for abdominal pain, blood in stool, constipation, diarrhea, nausea and vomiting.   Genitourinary: Negative for dysuria.   Musculoskeletal: Negative for back pain.        Ankle pain   Skin: Negative for rash.   Neurological: Negative for dizziness, syncope, light-headedness and headaches.       Physical Exam     Initial Vitals [09/08/18 0130]   BP Pulse Resp Temp SpO2   (!) 146/82 72 18 98.5 °F (36.9 °C) 96 %      MAP       --         Physical Exam    Constitutional: Vital signs are normal. She appears well-developed and well-nourished.   HENT:   Head: Normocephalic and atraumatic.   Eyes: Pupils are equal, round, and reactive to light.   Neck: Normal range of motion. Neck supple. No tracheal deviation present.   Cardiovascular: Normal rate, regular rhythm and intact distal pulses.   Murmur heard.  Pulmonary/Chest: Breath sounds normal.   Abdominal: Soft. Bowel sounds are normal. She exhibits no mass. There is no tenderness. There is no rigidity, no rebound, no guarding and no CVA tenderness.   Musculoskeletal: She exhibits tenderness.   Left ankle with significant bruising circumferentially around ankle  Free movement with some crepitus palpated on the lateral aspect of malleolus  Tenderness worse on medial aspect of the malleolus  Bruising noted over dorsal aspect of left ft  Dorsalis pedis intact bilaterally  Patient resting with slight plantar flexion at rest, able to plantar flex but unable to dorsiflex secondary to pain  Able to move toes   Neurological: She is alert and oriented to person, place, and time. She has normal strength. No sensory deficit.   Skin: Skin is warm and dry.         ED Course   Procedures  Labs Reviewed   APTT   PROTIME-INR   HEMOGLOBIN A1C   MAGNESIUM   PHOSPHORUS   URINALYSIS, REFLEX TO URINE CULTURE        HO-II MDM:    84 yo  woman with PMH of CVA, HTN who presents with L ankle pain. Pt was just discharged from here after CVA admission, no residual deficits.  Patient had a mechanical fall, suspect that ankle is fractured vs dislocated.  No other injuries.  No head trauma, LOC, CT head not indicated.  Labs drawn within 24 hr.  Because patient was just in the hospital.  Fentanyl given for pain, ice pack applied    Awa Urbina MD MPH  PGY2 [employer] EM  9/8/2018 1:39 AM    HO-II update:    X-ray showed ankle fracture dislocation.  Orthopedics was consulted, they have reduced the ankle here in the emergency department.  Day will not admit for emergent surgery, recommended discharge. Patient's family concerned on her ability to ambulate given that she was just discharged and felt the same day.  Rehab options were looked at during her admission, however patient's family opted for home health instead.  Will admit to obs for rehab placement since patient is able to care for herself.    Awa Urbina MD MPH  PGY2 [employer] EM  9/8/2018 5:42 AM              Imaging Results          X-Ray Chest AP Portable (In process)                X-Ray Ankle Complete Left (Final result)  Result time 09/08/18 02:32:56    Final result by Vincent Griggs MD (09/08/18 02:32:56)                 Impression:      Ankle fracture dislocation.  Orthopedic evaluation recommended.      Electronically signed by: Vincent Griggs MD  Date:    09/08/2018  Time:    02:32             Narrative:    EXAMINATION:  XR ANKLE COMPLETE 3 VIEW LEFT; XR FOOT COMPLETE 3 VIEW LEFT    CLINICAL HISTORY:  Unspecified fall, initial encounter    TECHNIQUE:  AP, lateral and oblique views of the left ankle were performed.    COMPARISON:  None    FINDINGS:  There is a significantly displaced fracture of the lateral malleolus with anterior and medial dislocation of the tibia with respect to the talus.  No displaced fracture is identified involving the foot.  Significant soft tissue swelling about the ankle.   Possible avulsion fragments at the medial aspect of the talus.                               X-Ray Foot Complete Left (Final result)  Result time 09/08/18 02:32:56    Final result by Vincent Griggs MD (09/08/18 02:32:56)                 Impression:      Ankle fracture dislocation.  Orthopedic evaluation recommended.      Electronically signed by: Vincent Griggs MD  Date:    09/08/2018  Time:    02:32             Narrative:    EXAMINATION:  XR ANKLE COMPLETE 3 VIEW LEFT; XR FOOT COMPLETE 3 VIEW LEFT    CLINICAL HISTORY:  Unspecified fall, initial encounter    TECHNIQUE:  AP, lateral and oblique views of the left ankle were performed.    COMPARISON:  None    FINDINGS:  There is a significantly displaced fracture of the lateral malleolus with anterior and medial dislocation of the tibia with respect to the talus.  No displaced fracture is identified involving the foot.  Significant soft tissue swelling about the ankle.  Possible avulsion fragments at the medial aspect of the talus.                              X-Rays:   Independently Interpreted Readings:   Other Readings:  Tibial dislocation and fibular fracture    Medical Decision Making:   History:   Old Medical Records: I decided to obtain old medical records.  Independently Interpreted Test(s):   I have ordered and independently interpreted X-rays - see prior notes.  Clinical Tests:   Radiological Study: Ordered and Reviewed              Attending Attestation:   Physician Attestation Statement for Resident:  As the supervising MD   Physician Attestation Statement: I have personally seen and examined this patient.   I agree with the above history. -: Reports getting home from hospital today, getting ready for bed, she lost her balance, twisted her leg and fell. She did not hit her head or LOC. Normal behavior and speech, no confusion at all. No vision or hearing changes, no chest painis, breathing comfortably, pain only at left ankle.    As the supervising MD  I agree with the above PE.   -: no left foot or leg thigh hip pain. Mild discomfort in left forefoot area. no achilles tendon, shin calf or thigh tenderness. no scalp tenderness, step-offs, or hematomas, no tenderness along jaw, anterior neck, or c-spine, chest is non tender, heart is reg w/o murmur. Abd soft non tender, good inspiratory and expiratory w/o wheezes or crackles, good sensation, good strength, I can flex and move elbows and shoulders no pain at all, right foot completely normal, no pain with ROM. Left foot slightly dorsiflexed position, can move all toes with slight pain, plantar flexion hurts her more, no dorsiflexion w/o extreme pain. Edema and ecchymosis to ankle anteriorly. Malleolar pain with palpation, no calf, shin, knee, thigh, or hip tenderness.    As the supervising MD I agree with the above treatment, course, plan, and disposition.   -: Will obtain images. Will consider fracture, dislocation, sprain.     Tibial dislocation and fibular fracture.     I have reviewed and agree with the residents interpretation of the following: x-rays.                       Clinical Impression:   Diagnoses of Fall and Pre-op testing were pertinent to this visit.                             Awa Urbina MD  Resident  09/08/18 [id number]       Awa Urbina MD  Resident  09/08/18 [id number]

## 2018-09-08 NOTE — CONSULTS
Already following patient. Please see last progress note for recommendations.      CONCHITA Silverman, FNP-C  Physical Medicine & Rehabilitation   09/08/2018  Spectralink: [id number]

## 2018-09-08 NOTE — PT/OT/SLP PROGRESS
Speech Language Pathology      Mitzy Perez  MRN: [id number]    SLP evaluation orders received. Patient not seen today secondary to Patient fatigue.  SLP attempted to wake Patient; however, patient not able to sustain attention and easily fell back to sleep. Family at bedside and educated on plans for ST to re-attempt next service day.  Findings reviewed with nurse.  Thank you.    ALISHA Gallo., [facility]-SLP  Speech-Language Pathology  Pager: [phone]  9/8/2018

## 2018-09-08 NOTE — ASSESSMENT & PLAN NOTE
Mitzy Perez is a 85 y.o. female with left trimal ankle fracture  - Reduced and splinted in ER  - Typically this fracture is treated operatively, however, given recent stroke and advanced age, will consider further  - NWB LLE  - PT/OT ordered  - Ice/elevation LLE    Procedure Note: left ankle reduction  Patient was explained risks, benefits, and alternatives to treatment and verbalized consent to proceed. Time out was performed and patient name, , site, and procedure were confirmed. 10 cc of 1% lidocaine was used for hematoma block and additional 10 cc of lidocaine was injected intra-articularly. Fracture was reduced under fluoroscopy. Short leg splint with stirrups was applied in typical fashion. Post-reduction films were performed and confirmed adequate reduction.

## 2018-09-08 NOTE — ASSESSMENT & PLAN NOTE
85 year old female with slurred speech and worsening of baseline vision (history of macular degeneration) - MRI with scattered punctate areas of restricted diffusion. Pt admitted to Vascular Neurology for stroke work up.     On exam, pt moves all extremities 5/5. Hard to assess visual fields due to vision loss from macular degeneration. Pt stating her vision is not blurrier then normal but daughter stating it is. Pt oriented x3 but slow respond. Dysarthria is present on exam.    Stroke etiology suspected ESUS vs cardioembolic at this time. Pt with severely enlarged LA on echo; will plan for 30-day cardiac event monitor and referral to Cardiology at d/c.    Increased pt's BP regimen with good response. Discussed at length with patient and family at bedside pt's need for close primary care, appropriate cancer screenings, management of stroke risk factors, etc. They are amenable to plan for closer medical follow-up than pt had been receiving prior to this event. Pt d/c home today per care of family with HH.      Antithrombotics for secondary stroke prevention: Antiplatelets: Aspirin: 81 mg daily  Statins for secondary stroke prevention and hyperlipidemia, if present:   Statins: Atorvastatin- 40 mg daily   Aggressive risk factor modification: HTN, HLD, Diet, Obesity  Rehab efforts: PT/OT/SLP recommending IPR but family electing for HH  BP parameters: Infarct: SBP < 140 long-term

## 2018-09-08 NOTE — ASSESSMENT & PLAN NOTE
- Elevated on arrival in setting of pain and missed AM meds  - Resume home regimen: Norvasc 5 mg daily, coreg 6.25 mg BID, lisinopril 20 mg daily  - Continue to monitor

## 2018-09-08 NOTE — DISCHARGE SUMMARY
[facility]  Vascular Neurology  Comprehensive Stroke Center  Discharge Summary     Summary:     Admit Date: 9/5/2018  2:17 PM    Discharge Date and Time: 9/7/2018  7:37 PM    Attending Physician: Bj García MD    Discharge Provider: Magdalena Zepeda PA-C    History of Present Illness: 85 year old female with past medical hx of osteoarthritis and macular degeneration presented to the ED today after complaints of light headedness and decrease in vision. Her daughter is at the bedside and states patient has baseline vision loss from macular degeneration but is able to see peripherally. She noticed over the past few days her mom has been slower to speak and her speech had become more slurred. Patient states since yesterday she feels like she is leaning towards one side when she walks. Patient performs ADLS independently and at baseline does not need equipment assistance to walk. She lives at home with her daughter. She denies any unilateral weakness or facial droop. MRI was completed in the ED revealing multifocal punctate regions of restricted diffusion. Will admit to Vascular Neurology for stroke work up.       Hospital Course (synopsis of major diagnoses, care, treatment, and services provided during the course of the hospital stay): Ms. Mitzy Perez was admitted to Vascular Neurology for acute stroke workup. Stroke etiology suspected to be embolism at this time. Pt with enlarged LA on echo; ordered 30-day cardiac event monitor. Cardiology was consulted this admission for elevated troponins; ambulatory referral for follow-up placed at discharge.    Patient discharged with recommendations for admission to inpatient rehab, however family opting for home health with 24-hr supervision at this time. Family at bedside amenable to plan.     Patient with improvement in stroke symptoms since admission. Inpatient acute stroke work up completed and patient stable for discharge. Please see all  appropriate medication changes, imaging results, and necessary follow-up below.    STROKE DOCUMENTATION         NIH Scale:  1a. Level Of Consciousness: 0-->Alert: keenly responsive  1b. LOC Questions: 0-->Answers both questions correctly  1c. LOC Commands: 0-->Performs both tasks correctly  2. Best Gaze: 0-->Normal  3. Visual: 1-->Partial hemianopia  4. Facial Palsy: 0-->Normal symmetrical movements  5a. Motor Arm, Left: 0-->No drift: limb holds 90 (or 45) degrees for full 10 secs  5b. Motor Arm, Right: 0-->No drift: limb holds 90 (or 45) degrees for full 10 secs  6a. Motor Leg, Left: 0-->No drift: leg holds 30 degree position for full 5 secs  6b. Motor Leg, Right: 0-->No drift: leg holds 30 degree position for full 5 secs  7. Limb Ataxia: 0-->Absent  8. Sensory: 0-->Normal: no sensory loss  9. Best Language: 0-->No aphasia: normal  10. Dysarthria: 1-->Mild-to-moderate dysarthria: patient slurs at least some words and, at worst, can be understood with some difficulty  11. Extinction and Inattention (formerly Neglect): 0-->No abnormality  Total (NIH Stroke Scale): 2        Modified Rutland Score: 0  Romina Coma Scale:    ABCD2 Score:    [id number] Score:   HAS -BLED Score:   ICH Score:   Hunt & Pandya Classification:       Assessment/Plan:     Diagnostic Results      Brain Imaging      CT Head 9/6/18  Examination mildly degraded by patient motion artifact.  Known small multifocal areas of recent infarction are better delineated on recent MRI of 09/05/2018.  No new hemorrhage or major vascular distribution infarct.  Mild chronic microvascular ischemic change throughout the supratentorial white matter.     9/5/18 - MRI   1. Multifocal punctate regions of restricted diffusion involving the left basal ganglia, left parietal region, left posterior corpus callosum, bilateral occipital lobes, and scattered within the cerebellum, concerning for multifocal punctate infarcts, likely of embolic source.  Is a focus of hemorrhage  hemorrhage/hemosiderin deposition within the left basal ganglia, likely with associated infarct.  2. Sequela of chronic microvascular ischemic change noting remote appearing infarcts involving the cerebellum and likely cerebellar vermis.  3. Small amount of fluid within the left mastoid air cells.     9/5/18 - CTA   CT brain:  Previously identified areas of scattered acute punctate infarction present however not as well characterized as when compared with same-date MRI.  No new intraparenchymal hemorrhage or major vascular distribution infarct.  Mild generalized cerebral volume loss and chronic microvascular ischemic change.  CTA head/neck:  Significant mixed calcific/noncalcific atherosclerotic change at bilateral carotid bifurcations with approximately 50% stenosis of the proximal right internal carotid artery per NASCET criteria.  Bilateral vertebral artery origins demonstrate mild narrowing, left greater than right, with the left vertebral origin demonstrating significant tortuosity.  Mild atherosclerotic change of the left V4 with minimal associated narrowing.  No significant focal stenosis, focal occlusion, or intracranial aneurysm formation of the intracranial vasculature.  Advanced degenerative changes of the cervical spine at the C5-C6 level.  Additional details above.        Echo w/ CFD 9/6/18  CONCLUSIONS     1 - Hyperdynamic left ventricular systolic function (EF >70%).     2 - Normal right ventricular systolic function .     3 - Severe left atrial enlargement.     4 - Mild aortic stenosis, CROW = 1.71 cm2, AVAi = 1.01 cm2/m2, peak velocity = 2.9 m/s, mean gradient = 15 mmHg.       Interventions: None    Complications: None    Disposition: Home or Self Care - Home Health    Final Active Diagnoses:    Diagnosis Date Noted POA    PRINCIPAL PROBLEM:  Acute ischemic multifocal multiple vascular territories stroke [I63.8] 09/05/2018 Yes    Visual hallucination [R44.1] 09/07/2018 Unknown    Asymptomatic  stenosis of right carotid artery [I65.21] 09/06/2018 Yes    Elevated troponin [R74.8] 09/05/2018 Yes    Essential hypertension [I10] 09/05/2018 Yes    Mixed hyperlipidemia [E78.2] 09/05/2018 Yes      Problems Resolved During this Admission:    Diagnosis Date Noted Date Resolved POA    Elevated INR [R79.1] 09/05/2018 09/07/2018 Yes     * Acute ischemic multifocal multiple vascular territories stroke    85 year old female with slurred speech and worsening of baseline vision (history of macular degeneration) - MRI with scattered punctate areas of restricted diffusion. Pt admitted to Vascular Neurology for stroke work up.     On exam, pt moves all extremities 5/5. Hard to assess visual fields due to vision loss from macular degeneration. Pt stating her vision is not blurrier then normal but daughter stating it is. Pt oriented x3 but slow respond. Dysarthria is present on exam.    Stroke etiology suspected ESUS vs cardioembolic at this time. Pt with severely enlarged LA on echo; will plan for 30-day cardiac event monitor and referral to Cardiology at d/c.    Increased pt's BP regimen with good response. Discussed at length with patient and family at bedside pt's need for close primary care, appropriate cancer screenings, management of stroke risk factors, etc. They are amenable to plan for closer medical follow-up than pt had been receiving prior to this event. Pt d/c home today per care of family with HH.      Antithrombotics for secondary stroke prevention: Antiplatelets: Aspirin: 81 mg daily  Statins for secondary stroke prevention and hyperlipidemia, if present:   Statins: Atorvastatin- 40 mg daily   Aggressive risk factor modification: HTN, HLD, Diet, Obesity  Rehab efforts: PT/OT/SLP recommending IPR but family electing for HH  BP parameters: Infarct: SBP < 140 long-term        Visual hallucination    Family reporting pt seeing things in room that weren't there yesterday; Family also reports progressive  cognitive decline in recent weeks/months  Repeat CTH, EEG, UA negative for a source  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 in the setting of hospitalization and acute stroke  Pt with no further visual hallucinations today, oriented x3  Will d/c home with plan for close PCP follow-up of mental state        Asymptomatic stenosis of right carotid artery    50% stenosis on CTA, not related to cause of stroke   ASA, statin, outpatient monitoring         Elevated troponin    Cardiology consulted in ED - Appreciate assistance  - Suspect demand ischemia 2/2 long-standing HTN though concern for NSTEMI  Pt was w/o sx; Team recommended not trending troponins any further  Added Coreg per Cards recs (+amlodipine, lisinopril)  Ambulatory referral to Cardiology at d/c        Mixed hyperlipidemia    Stroke risk factor    Atorvastatin 40 mg   Mildly elevated LFTs; Trending down appropriately  PCP follow-up        Essential hypertension    Stroke risk factor   SBP < 140 long-term  Restarted amlodipine and lisinopril, and added Coreg per Cardiology recs  BP with good response to therapies; Will d/c with plan for PCP follow-up            Recommendations:     Post-discharge complication risks: Falls    Stroke Education given to: patient, family and caregiver    Follow-up in Stroke Clinic in 4-6 weeks.     Discharge Plan:  Antithrombotics: Aspirin 81mg  Statin: Atorvastatin 40mg  Aggresive risk factor modification:  Hypertension  High Cholesterol  Diet  Exercise    Follow Up:  Follow-up Information     [facility] In 6 weeks.    Specialty:  Vascular Neurology  Why:  Someone from our office will call you to set up a hospital follow-up appt within 1 week. In case no one calls within 1 week, call number above to schedule an appt.  Contact information:  [address]  [facility] [id number] [phone]           Marta Michael MD In 1 week.    Specialty:  Internal  Medicine  Why:  Call your PCP to set up a hospital follow-up appt within 1 week of discharge  Contact information:  [address]  [facility] [address]  [phone]             Vincent Guo MD In 2 weeks.    Specialty:  Cardiovascular Disease  Why:  Contact office to make a follow-up appointment within 2 weeks.  Contact information:  [address] [address]  [phone]                   Patient Instructions:      Ambulatory Referral to Cardiology   Referral Priority: Routine Referral Type: Consultation   Referral Reason: Specialty Services Required   Requested Specialty: Cardiology   Number of Visits Requested: 1     Cardiac event monitor   Standing Status: Future Standing Exp. Date: 09/07/19     Order Specific Question Answer Comments   Cardiac Event Monitor Auto Trigger        Medications:  Reconciled Home Medications:      Medication List      START taking these medications    amLODIPine 5 MG tablet  Commonly known as:  NORVASC  Take 1 tablet (5 mg total) by mouth once daily.     atorvastatin 40 MG tablet  Commonly known as:  LIPITOR  Take 1 tablet (40 mg total) by mouth once daily.     carvedilol 6.25 MG tablet  Commonly known as:  COREG  Take 1 tablet (6.25 mg total) by mouth 2 (two) times daily.     lisinopril 20 MG tablet  Commonly known as:  PRINIVIL,ZESTRIL  Take 1 tablet (20 mg total) by mouth once daily.        CONTINUE taking these medications    aspirin 81 MG EC tablet  Commonly known as:  ECOTRIN  Take 81 mg by mouth every evening.     fish oil-omega-3 fatty acids 300-1,000 mg capsule  Take 1 capsule by mouth once daily.     ICAPS ORAL  Take 1 tablet by mouth once daily.            Magdalena Zepeda PA-C  Comprehensive Stroke Center  Department of Vascular Neurology   [facility]

## 2018-09-09 NOTE — PLAN OF CARE
WINSTON put in call to Felipa at [facility], [phone], and she stated that bed would be available tomorrow morning.  Erika MCKENZIE updated.

## 2018-09-09 NOTE — PLAN OF CARE
Problem: SLP Goal  Goal: SLP Goal  Speech Language Pathology Goals  Goals expected to be met by 9/16/18    1.  Pt will tolerate regular consistency diet w/ thin liquids w/ no overt signs of aspiration.  2.  Pt will complete Speech Language Cognitive evaluation to determine the need for additional goals.      Outcome: Ongoing (interventions implemented as appropriate)  Clinical Swallow Evaluation completed.  REC:  Pt cont w/ regular consistency diet w/ thin liquids, oral meds whole 1-2 at a time, feeding assistance for all meals, aspiration precautions.  Cont ST per POC.    Vilma Prabhakar CCC-SLP  9/9/2018

## 2018-09-09 NOTE — PROGRESS NOTES
"[facility]  Orthopedics  Progress Note    Patient Name: Mitzy Perez  MRN: [id number]  Admission Date: 9/8/2018  Hospital Length of Stay: 0 days  Attending Provider: Karyna Vaughn MD  Primary Care Provider: Marta Michael MD    Subjective:     Principal Problem: L trimalleolar ankle fracture    Principal Orthopedic Problem: same    Interval History: Patient seen and examined at bedside.  No acute events overnight.  Pain controlled. Patient without complaints.       Review of patient's allergies indicates:  No Known Allergies    Current Facility-Administered Medications   Medication    acetaminophen tablet 650 mg    amLODIPine tablet 5 mg    aspirin EC tablet 81 mg    atorvastatin tablet 40 mg    carvedilol tablet 6.25 mg    enoxaparin injection 40 mg    lisinopril tablet 20 mg    ondansetron disintegrating tablet 8 mg    ondansetron injection 4 mg    sodium chloride 0.9% flush 3 mL    traMADol tablet 50 mg     Objective:     Vital Signs (Most Recent):  Temp: 98.1 °F (36.7 °C) (09/09/18 0404)  Pulse: 64 (09/09/18 0404)  Resp: 16 (09/09/18 0404)  BP: (!) 140/62 (09/09/18 0404)  SpO2: (!) 92 % (09/09/18 0404) Vital Signs (24h Range):  Temp:  [97.7 °F (36.5 °C)-98.6 °F (37 °C)] 98.1 °F (36.7 °C)  Pulse:  [54-71] 64  Resp:  [16-18] 16  SpO2:  [91 %-94 %] 92 %  BP: (108-189)/(54-72) 140/62        Height: 4' 9" (144.8 cm)  Body mass index is 37.31 kg/m².    No intake or output data in the 24 hours ending 09/09/18 0603    Ortho/SPM Exam   MSK:   Splint in place.  SILT DP/SP/BARNEY  Motor intact EHL/FHL  Brisk cap refill  Warm well perfused extremities  palpable    Significant Labs:   CBC:   Recent Labs   Lab  09/08/18   0821  09/09/18   0333   WBC  7.56  9.41   HGB  11.7*  10.9*   HCT  36.8*  33.5*   PLT  175  162     CMP:   Recent Labs   Lab  09/08/18   0821  09/09/18   0333   NA  140  136   K  4.2  4.2   CL  109  105   CO2  24  24   GLU  90  93   BUN  27*  36*   CREATININE  1.2  1.4 "   CALCIUM  9.3  8.8   PROT  7.1  6.8   ALBUMIN  2.6*  2.5*   BILITOT  0.8  0.8   ALKPHOS  123  118   AST  58*  53*   ALT  24  22   ANIONGAP  7*  7*   EGFRNONAA  41.3*  34.3*     All pertinent labs within the past 24 hours have been reviewed.    Significant Imaging: I have reviewed all pertinent imaging results/findings.    Assessment/Plan:     Trimalleolar fracture of left ankle    Mitzy Perez is a 85 y.o. female with left trimal ankle fracture  -NWB LLE  - Typically this fracture is treated operatively, however, given recent stroke and advanced age, will consider further  - NWB LLE  - PT/OT ordered for this today  - Ice/elevation LLE  - H/H 10.9/33.5                Brian Aponte MD  Orthopedics  [facility]

## 2018-09-09 NOTE — PLAN OF CARE
Problem: Fall Risk (Adult)  Goal: Absence of Falls  Patient will demonstrate the desired outcomes by discharge/transition of care.  Outcome: Ongoing (interventions implemented as appropriate)   09/09/18 0334   Fall Risk (Adult)   Absence of Falls making progress toward outcome       Problem: Patient Care Overview  Goal: Plan of Care Review  Outcome: Ongoing (interventions implemented as appropriate)   09/09/18 0334   Coping/Psychosocial   Plan Of Care Reviewed With patient   Weight shift assistance provided, LLE splinted, dressing c/d/i, sensation to LLE intact, pt able to move toes, LLE elevated as ordered, no distress/discomfort noted in pt, Family present at pt's side, all precautions maintained, will continue to monitor pt

## 2018-09-09 NOTE — PT/OT/SLP EVAL
"Speech Language Pathology Evaluation  Bedside Swallow    Patient Name:  Mitzy Perez   MRN:  [id number]  Admitting Diagnosis: Fall    Recommendations:                 General Recommendations:  Dysphagia therapy and Cognitive-linguistic evaluation  Diet recommendations:  Regular, Thin   Aspiration Precautions: 1 bite/sip at a time, Alternating bites/sips, Assistance with meals, Feed only when awake/alert, HOB to 90 degrees, Meds whole 1 at a time, Monitor for s/s of aspiration, Small bites/sips and Standard aspiration precautions   General Precautions: Standard, aspiration, vision impaired, fall  Communication strategies:  provide increased time to answer and go to room if call light pushed    History:     Past Medical History:   Diagnosis Date    Arthritis     Cataract     Essential hypertension 9/5/2018    Macular degeneration        Past Surgical History:   Procedure Laterality Date    CATARACT EXTRACTION  2003    LEFT EYE       Social History: Patient lives with her daughter.    Prior Intubation HX:  None this admission    Modified Barium Swallow: None this admission    Chest X-Rays: 9/8/18:  No detrimental change when compared with 09/05/2018.    Prior diet: regular    Occupation/hobbies/homemaking: none expressed.    Subjective     "She's been so sleepy."  Pt's granddaughter stated  Patient goals: none expressed    Pain/Comfort:  · Pain Rating 1: 0/10  · Pain Rating Post-Intervention 1: 0/10    Objective:     Oral Musculature Evaluation  · Oral Musculature: WFL  · Dentition: present and adequate  · Secretion Management: adequate  · Mucosal Quality: dry  · Mandibular Strength and Mobility: WFL  · Oral Labial Strength and Mobility: WFL  · Lingual Strength and Mobility: WFL  · Buccal Strength and Mobility: WFL  · Volitional Cough: adequate  · Volitional Swallow: present  · Voice Prior to PO Intake: clear    Bedside Swallow Eval:   Consistencies Assessed:  · Thin liquids cup and straw sips in isolation  · Puree " tsp bites  · Solids clinician fed bites     Oral Phase:   · Lingual residue:  Mild of solids on right lateral margin of tongue body    Pharyngeal Phase:   · WFL  · no overt clinical signs/symptoms of aspiration  · no overt clinical signs/symptoms of pharyngeal dysphagia    Compensatory Strategies  · liquid wash  · Multiple swallows   · Strategies cleared stasis    Treatment: Education was provided to pt and family re: SLP role, eval results, diet recs, aspiration precautions and SLP POC.  They indicated good understanding.  Pt's whiteboard was updated.    Assessment:     Mitzy Perez is a 85 y.o. female with an SLP diagnosis of Dysphagia.  She presents with mild oral dysphagia at this time.  She would benefit from ongoing Skilled Speech services for monitoring of diet tolerance and further evaluation of cognitive-linguistic skills.    Goals:   Multidisciplinary Problems     SLP Goals        Problem: SLP Goal    Goal Priority Disciplines Outcome   SLP Goal     SLP Ongoing (interventions implemented as appropriate)   Description:  Speech Language Pathology Goals  Goals expected to be met by 9/16/18    1.  Pt will tolerate regular consistency diet w/ thin liquids w/ no overt signs of aspiration.  2.  Pt will complete Speech Language Cognitive evaluation to determine the need for additional goals.                        Plan:     · Patient to be seen:  4 x/week   · Plan of Care expires:  10/08/18  · Plan of Care reviewed with:  patient, grandchild(paty)   · SLP Follow-Up:  Yes       Discharge recommendations:  rehabilitation facility   Barriers to Discharge:  None    Time Tracking:     SLP Treatment Date:   09/09/18  Speech Start Time:  0913  Speech Stop Time:  0939     Speech Total Time (min):  26 min    Billable Minutes: Eval Swallow and Oral Function 26    Vilma Prabhakar CCC-SLP  09/09/2018

## 2018-09-09 NOTE — SUBJECTIVE & OBJECTIVE
"Principal Problem: L trimalleolar ankle fracture    Principal Orthopedic Problem: same    Interval History: Patient seen and examined at bedside.  No acute events overnight.  Pain controlled. Patient without complaints.       Review of patient's allergies indicates:  No Known Allergies    Current Facility-Administered Medications   Medication    acetaminophen tablet 650 mg    amLODIPine tablet 5 mg    aspirin EC tablet 81 mg    atorvastatin tablet 40 mg    carvedilol tablet 6.25 mg    enoxaparin injection 40 mg    lisinopril tablet 20 mg    ondansetron disintegrating tablet 8 mg    ondansetron injection 4 mg    sodium chloride 0.9% flush 3 mL    traMADol tablet 50 mg     Objective:     Vital Signs (Most Recent):  Temp: 98.1 °F (36.7 °C) (09/09/18 0404)  Pulse: 64 (09/09/18 0404)  Resp: 16 (09/09/18 0404)  BP: (!) 140/62 (09/09/18 0404)  SpO2: (!) 92 % (09/09/18 0404) Vital Signs (24h Range):  Temp:  [97.7 °F (36.5 °C)-98.6 °F (37 °C)] 98.1 °F (36.7 °C)  Pulse:  [54-71] 64  Resp:  [16-18] 16  SpO2:  [91 %-94 %] 92 %  BP: (108-189)/(54-72) 140/62        Height: 4' 9" (144.8 cm)  Body mass index is 37.31 kg/m².    No intake or output data in the 24 hours ending 09/09/18 0603    Ortho/SPM Exam   MSK:   Splint in place.  SILT DP/SP/BARNEY  Motor intact EHL/FHL  Brisk cap refill  Warm well perfused extremities  palpable    Significant Labs:   CBC:   Recent Labs   Lab  09/08/18   0821 09/09/18   0333   WBC  7.56  9.41   HGB  11.7*  10.9*   HCT  36.8*  33.5*   PLT  175  162     CMP:   Recent Labs   Lab  09/08/18   0821 09/09/18   0333   NA  140  136   K  4.2  4.2   CL  109  105   CO2  24  24   GLU  90  93   BUN  27*  36*   CREATININE  1.2  1.4   CALCIUM  9.3  8.8   PROT  7.1  6.8   ALBUMIN  2.6*  2.5*   BILITOT  0.8  0.8   ALKPHOS  123  118   AST  58*  53*   ALT  24  22   ANIONGAP  7*  7*   EGFRNONAA  41.3*  34.3*     All pertinent labs within the past 24 hours have been reviewed.    Significant Imaging: I have " reviewed all pertinent imaging results/findings.

## 2018-09-09 NOTE — PLAN OF CARE
"CM to bedside - pt & dgtrRadha present; dgtr provided assessment info. Pt w/ DME in place, lives w/ dgtr. Pt just d/c'ed from Stroke services but fell at home and suffered an ankle fracture. Pt needs surgery after swelling subsided and a 3 night inpat stay to qualify for SNF. Pt will transfer to [facility] w. Pt is expected to return to [facility] for surgery on Friday 9/14 (per family). After surgery, family wants pt to transfer to [facility] - pt's Ashley waller [phone] works in administration at the facility. During the course of the conversation, pt's Radha welch stated that pt has a Living Will, POA and LaPost. CM obtained copies of the living will and LaPost and placed on chart. CM was unable to copy POA as the documents were 8x14 size and the printer was jamming. CM notified MAGALY Gregg w/ IMF team concerning pt's LaPost and living will - she will f/u and speak w/ pt/dgtr (POA). Final item for f/u - pt was ordered a cardiac event monitor on last admit; will item remain to be mailed to pt's home or can clinic deliver to bedside prior to d/c. CM will email regular Vannesa MONTERO for f/u.    CM provided patient anticipated [facility]IOT which will be update by nursing staff. Patient provided a Blue "My Health Packet" for d/c planning and health tool. Patient verbalized understanding.     09/09/18 1631   Discharge Assessment   Assessment Type Discharge Planning Assessment   Confirmed/corrected address and phone number on facesheet? Yes   Assessment information obtained from? Patient;Caregiver;Medical Record   Expected Length of Stay (days) 3   Communicated expected length of stay with patient/caregiver yes   Prior to hospitilization cognitive status: Unable to Assess   Prior to hospitalization functional status: Assistive Equipment;Needs Assistance;Partially Dependent   Current cognitive status: Not Oriented to Time   Current Functional Status: Assistive Equipment;Needs Assistance;Partially Dependent "   Facility Arrived From: N/A   Lives With child(paty), adult;grandchild(paty)   Able to Return to Prior Arrangements unable to determine at this time (comments)   Is patient able to care for self after discharge? Unable to determine at this time (comments)   Who are your caregiver(s) and their phone number(s)? rebekah Garcia [phone]; sridhar Ramirez [phone]   Patient's perception of discharge disposition rehab facility   Readmission Within The Last 30 Days current reason for admission unrelated to previous admission   If yes, most recent facility name: [facility]   Patient currently receives any other outside agency services? No   Equipment Currently Used at Home wheelchair;walker, rolling   Do you have any problems affording any of your prescribed medications? No   Is the patient taking medications as prescribed? yes   Does the patient have transportation home? Yes   Transportation Available family or friend will provide   Dialysis Name and Scheduled days N/A   Does the patient receive services at the [facility]? No   Discharge Plan A Rehab   Discharge Plan B Skilled Nursing Facility   Patient/Family In Agreement With Plan yes   Readmission Questionnaire   At the time of your discharge, did someone talk to you about what your health problems were? Yes   At the time of discharge, did someone talk to you about what to watch out for regarding worsening of your health problem? Yes   At the time of discharge, did someone talk to you about what to do if you experienced worsening of your health problem? Yes   At the time of discharge, did someone talk to you about which medication to take when you left the hospital and which ones to stop taking? Yes   At the time of discharge, did someone talk to you about when and where to follow up with a doctor after you left the hospital? Yes   What do you believe caused you to be sick enough to be re-admitted? ankle fracture   How often do you need to have someone help you when  you read instructions, pamphlets, or other written material from your doctor or pharmacy? Rarely   Do you have problems taking your medications as prescribed? No   Do you have any problems affording any of  your prescribed medications? No   Do you have problems obtaining/receiving your medications? No   Does the patient have transportation to healthcare appointments? Yes   Living Arrangements house   Does the patient have family/friends to help with healtcare needs after discharge? yes   Does your caregiver provide all the help you need? Yes   Are you currently feeling confused? No   Are you currently having problems thinking? Yes   Are you currently having memory problems? No

## 2018-09-09 NOTE — HOSPITAL COURSE
Pt admitted for rehab placement after mechanical fall at home resulting in trimalleolar fracture of L ankle. Ortho surg rec NWB LLE, ice/elevation. PT/OT/SLP consulted. 9/11 Open treatment of right trimalleolar ankle fracture with internal fixation of lateral malleolus without fixation of posterior lip.  Open treatment of left ankle syndesmosis injury with internal fixation.  Discharge to [facility]. Ortho recs upon discharge NWB LLE, sutures out in two weeks and she will go into a cast until 6 and then begin range of motion, weightbearing at 8 to 10 weeks postop.

## 2018-09-09 NOTE — ASSESSMENT & PLAN NOTE
Closed displaced fracture of L lateral malleolus  S/p mechanical fall at home  - Discharged from [facility] yesterday after admission for CVA (9/5-9/7). During that admission, recommendations were for patient to go to inpatient rehab; family opted for patient to return home with [facility] and -7 care  - Mechanical fall while attempting to go to bathroom without assistance after arriving home  - Imaging revealed displaced fracture of L lateral malleolus with anterior and medial dislocation of the tibia  - Ortho surgery reduced and splinted in the ER; rec NWB LLE and initial plan for outpatient surgery  - Per ortho surg, typically this fracture is treated operatively, however, given recent stroke and advanced age, will consider further  - Cautious use of opioids in elderly, analgesics PRN  - Family in agreement with patient going to Rehab for continued therapy. Family would like [facility]  - PT/OT/SLP consulted  - Anticipate discharge to [facility] 9/10. Per ortho surg, tentative plan for OR Friday and will check in on Thursday at rehab.    Pt is intermediate risk for a low risk surgery.  Would recommend continuing ASA and Coreg through surgery. Would also recommend regional anesthesia > general anesthesia if possible.

## 2018-09-09 NOTE — SUBJECTIVE & OBJECTIVE
Interval History: No acute events overnight. This AM, pt sleeping in bed with granddaughter at bedside. Pt easily aroused, alert and oriented. Pt reports minimal pain to LLE. Discussed plan of care with patient and granddaughter.     Review of Systems   Constitutional: Positive for fatigue. Negative for chills, diaphoresis and fever.   Eyes: Positive for visual disturbance (chronic; no changes).   Respiratory: Negative for cough, chest tightness, shortness of breath and wheezing.    Cardiovascular: Negative for chest pain, palpitations and leg swelling.   Gastrointestinal: Negative for abdominal distention, abdominal pain, diarrhea, nausea and vomiting.   Musculoskeletal: Positive for myalgias. Negative for back pain and gait problem.        L ankle pain   Neurological: Negative for dizziness, seizures, syncope, speech difficulty, light-headedness, numbness and headaches.   Psychiatric/Behavioral: Negative for agitation, confusion, dysphoric mood and hallucinations. The patient is not nervous/anxious.      Objective:     Vital Signs (Most Recent):  Temp: 98.5 °F (36.9 °C) (09/09/18 0816)  Pulse: (!) 54 (09/09/18 0816)  Resp: 18 (09/09/18 0816)  BP: 139/63 (09/09/18 0816)  SpO2: (!) 93 % (09/09/18 0816) Vital Signs (24h Range):  Temp:  [97.9 °F (36.6 °C)-98.6 °F (37 °C)] 98.5 °F (36.9 °C)  Pulse:  [54-64] 54  Resp:  [16-18] 18  SpO2:  [91 %-95 %] 93 %  BP: (108-140)/(54-63) 139/63        Body mass index is 37.31 kg/m².    Intake/Output Summary (Last 24 hours) at 9/9/2018 1148  Last data filed at 9/9/2018 0602  Gross per 24 hour   Intake 255 ml   Output --   Net 255 ml      Physical Exam   Constitutional: She is oriented to person, place, and time. She appears well-developed and well-nourished. No distress.   Cardiovascular: Normal rate, regular rhythm, normal heart sounds and intact distal pulses.   Pulmonary/Chest: Effort normal and breath sounds normal. No stridor. No respiratory distress.   Abdominal: Soft. Bowel  sounds are normal. She exhibits no distension. There is no tenderness.   Musculoskeletal:   LLE splinted; CDI; sensation intact; able to move toes   Neurological: She is alert and oriented to person, place, and time. No cranial nerve deficit.   Skin: Skin is warm and dry. She is not diaphoretic. No erythema.   Psychiatric: She has a normal mood and affect. Her behavior is normal.       Significant Labs: All pertinent labs within the past 24 hours have been reviewed.    Significant Imaging: I have reviewed all pertinent imaging results/findings within the past 24 hours.

## 2018-09-09 NOTE — PROGRESS NOTES
Pharmacist Renal Dose Adjustment Note    Mitzy Perez is a 85 y.o. female being treated with the medication enoxaparin    Patient Data:    Vital Signs (Most Recent):  Temp: 98.5 °F (36.9 °C) (09/09/18 0816)  Pulse: (!) 54 (09/09/18 0816)  Resp: 18 (09/09/18 0816)  BP: 139/63 (09/09/18 0816)  SpO2: (!) 93 % (09/09/18 0816)   Vital Signs (72h Range):  Temp:  [97.7 °F (36.5 °C)-99.2 °F (37.3 °C)]   Pulse:  [54-83]   Resp:  [16-22]   BP: (108-199)/(52-88)   SpO2:  [90 %-96 %]      Recent Labs   Lab  09/07/18   0424  09/08/18   0821  09/09/18   0333   CREATININE  1.2  1.2  1.4     Creatinine clearance cannot be calculated (Unknown ideal weight.)    Medication:enoxaparin dose: 40 mg frequency daily will be changed to medication:enoxaparin dose:30 mg frequency:daily    Pharmacist's Name: Vasu Peraza  Pharmacist's Extension: [phone]

## 2018-09-09 NOTE — PLAN OF CARE
Non-Emergent Surgery    Active cardiac issues:  Decompensated heart failure? No   Unstable angina No   Significant arrhythmias? Yes (possible Afib, not proven)   Severe valvular heart disease? No   Recent MI < 30 days? No     Functional mets <4    < 4 METs -unable to walk > 2 blocks on level ground without stopping due to symptoms  - eating, dressing, toileting, walking indoors, light housework. POOR   > 4 METs -climbing > 1 flight of stairs without stopping  -walking up hill > 1-2 blocks  -scrubbing floors  -moving furniture  - golf, bowling, dancing or tennis  -running short distance MODERATE to EXCELLENT     Cardiac Clinical Risk Factors  High risk surgery? No   History of CAD/ischemic heart disease? No   History of cerebrovascular disease? Yes   History of compensated heart failure? No   Type 2 diabetes requiring insulin? No   Serum Creatinine > 2? No   Total cardiac risk factors 1       Pt is intermediate risk for a low risk surgery.  Would recommend continuing ASA and Coreg through surgery. Would also recommend regional anesthesia > general anesthesia if possible.

## 2018-09-09 NOTE — ASSESSMENT & PLAN NOTE
Mitzy Perez is a 85 y.o. female with left trimal ankle fracture  -NWB LLE  - Typically this fracture is treated operatively, however, given recent stroke and advanced age, will consider further  - NWB LLE  - PT/OT ordered for this today  - Ice/elevation LLE  - H/H 10.9/33.5

## 2018-09-10 NOTE — PT/OT/SLP EVAL
"Speech Language Pathology Evaluation  Cognitive Communication    Patient Name:  Mitzy Perez   MRN:  [id number]  Admitting Diagnosis: Fall    Recommendations:     Recommendations:                General Recommendations:  Cognitive-linguistic therapy  Diet recommendations:  Regular, Thin   Aspiration Precautions: Standard aspiration precautions   General Precautions: Standard, aspiration, vision impaired, fall  Communication strategies:  provide increased time to answer    History:     Past Medical History:   Diagnosis Date    Arthritis     Cataract     Essential hypertension 9/5/2018    Macular degeneration        Past Surgical History:   Procedure Laterality Date    CATARACT EXTRACTION  2003    LEFT EYE       Social History: Patient lives with daughter    Prior diet: Regular/thin.        Subjective     Awake/alert  " My thinking is slower."     Pain/Comfort:  · Pain Rating 1: 0/10  · Pain Rating Post-Intervention 1: 0/10    Objective:   Cognitive Status:    Orientation Oriented x4  Memory Immediate Recall 5 numbers/4 words  Problem Solving Conclusions 100%, Categories 1/3 ind'ly and 2/3 cued and Compare/contrast able to contrast cues to compare      Receptive Language:   Comprehension:   Questions Complex yes/no 100%  Commands  complex/abstract commands 100%    Pragmatics:    WFL    Expressive Language:  Verbal:    WFL. Pt able to express wants/needs.   Nonverbal:   WFL      Motor Speech:  Dysarthria mild     Voice:   WFL    Visual-Spatial:  pt visually impaired 2/2 macular degneration    Reading:   unable to access 2/2 vision     Written Expression:   unable to access 2/2 vision      Assessment:   Mitzy Perez is a 85 y.o. female with an SLP diagnosis of Dysphagia and Cognitive-Linguistic Impairment.      Goals:   Multidisciplinary Problems     SLP Goals        Problem: SLP Goal    Goal Priority Disciplines Outcome   SLP Goal     SLP Ongoing (interventions implemented as appropriate)   Description:  Speech " Language Pathology Goals  Goals expected to be met by 9/16/18    1.  Pt will tolerate regular consistency diet w/ thin liquids w/ no overt signs of aspiration.  2.  Pt will complete Speech Language Cognitive evaluation to determine the need for additional goals. MET  3. Pt will recall 3/3 speech strategies   4 pt will complete dysarthria task with 90% intelligibility and min a  5. Pt will complete problem solving task with 80% accy and min cues                            Plan:   · Patient to be seen:  4 x/week   · Plan of Care expires:  10/08/18  · Plan of Care reviewed with:  patient   · SLP Follow-Up:  Yes       Discharge recommendations:  Discharge Facility/Level Of Care Needs: rehabilitation facility       Time Tracking:   SLP Treatment Date:   09/10/18  Speech Start Time:  1036  Speech Stop Time:  1044     Speech Total Time (min):  8 min    Billable Minutes: Katiana Ham CCC-SLP  09/10/2018

## 2018-09-10 NOTE — ASSESSMENT & PLAN NOTE
Closed displaced fracture of L lateral malleolus  S/p mechanical fall at home  - Discharged from [facility] [place]/7 after admission for CVA (9/5-9/7). During that admission, recommendations were for patient to go to inpatient rehab; family opted for patient to return home with [facility] and [facility]  - Mechanical fall while attempting to go to bathroom without assistance after arriving home  - Imaging revealed displaced fracture of L lateral malleolus with anterior and medial dislocation of the tibia  - Ortho surgery reduced and splinted in the ER; rec NWB LLE and initial plan for outpatient surgery  - Cautious use of opioids in elderly, analgesics PRN  - Family in agreement with patient going to Rehab for continued therapy. Family would like [facility]  - PT/OT/SLP consulted  - Per ortho surg, plan for OR 9/11. NPO at [place]. Held lovenox.  - Anticipate discharge to [facility]    Pt is intermediate risk for a low risk surgery.  Would recommend continuing ASA and Coreg through surgery. Would also recommend regional anesthesia > general anesthesia if possible.

## 2018-09-10 NOTE — TELEPHONE ENCOUNTER
zac  When she left hospital  That night had a fall and is having surgery possible tomorrow ,   If you want you can check on her , or just call the daughter

## 2018-09-10 NOTE — CONSULTS
Reviewed patient history and current admission.  Rehab team following.  Full consult to follow.    CONCHITA Dawson, PINOP-C  Physical Medicine & Rehabilitation   09/10/2018  Spectralink: [phone]

## 2018-09-10 NOTE — TELEPHONE ENCOUNTER
----- Message from Julietivana De La Cruz sent at 9/10/2018  2:23 PM CDT -----  Contact: Radha/Daughter/[phone]  Cc: Marta Michael MD  Patient is asking for a f/u with PCP (i offered an appointment with a np, but patient want to f/u with PCP). Please call and advise. Thank you

## 2018-09-10 NOTE — PT/OT/SLP EVAL
Physical Therapy Evaluation    Patient Name:  Mitzy Perez   MRN:  [id number]    Recommendations:     Discharge Recommendations:  rehabilitation facility   Discharge Equipment Recommendations: (TBD)   Barriers to discharge: Inaccessible home and Decreased caregiver support    Assessment:     Mitzy Perez is a 85 y.o. female admitted with a medical diagnosis of Fall.  She presents with the following impairments/functional limitations:  weakness, impaired endurance, impaired functional mobilty, decreased coordination, orthopedic precautions, impaired joint extensibility Patient tolerated evaluation  fairly well. Patient limited at this time by increased fatigue and weakness with all mobility. Attempt to stand x 2 trials with max x 2 A from EOB. Pt unable to come to complete stand without breaking NWB LLE precautions at this time. Pt has a hx of stroke and has chronic visual deficits with peripheral vision intact but decrease tracking B.   Patient will continue to require skilled PT services to address the above impairments to return to prior level of function as independent as possible. Patient would benefit from an inpatient rehabilitation therapy stay to address the impairments listed above in a multidisciplinary, therapy intense setting. Further therapy is required in order to facilitate  patients return to prior level of function as independent as possible, decrease caregiver burden and reduce risk of falls.     .    Rehab Prognosis:  fair; patient would benefit from acute skilled PT services to address these deficits and reach maximum level of function.      Recent Surgery: * No surgery found *      Plan:     During this hospitalization, patient to be seen 4 x/week to address the above listed problems via gait training, therapeutic activities, therapeutic exercises, neuromuscular re-education, wheelchair management/training  · Plan of Care Expires:  10/09/18   Plan of Care Reviewed with: patient(grand  daughter)    Subjective     Communicated with RN prior to session.  Patient found supine upon PT entry to room, agreeable to evaluation.      Chief Complaint: pt states that she cannot hold her uringe  Patient comments/goals: to get stronger  Pain/Comfort:  · Pain Rating 1: (no pain rated today)  · Pain Rating Post-Intervention 1: 0/10    Patients cultural, spiritual, Mosque conflicts given the current situation: none stated    Living Environment:    · patient lives with her daughter   · Pt lives in 1-story house/ trailer, number of outside stairs: 0  ·  Prior to admit patient was supervision with ambulation, bathing and hygiene, feeding, continence, grooming, toileting, dressing and family helped with cooking and cleaning with No Assistive Device  · Patient was primarily a home ambulatory.  · Equipment owned but not currently used rolling walker .   · Work/Hobbies:Not Working   ·  At return home patient will have assistance ( if needed )from daughter/grandaughter ( granddaughter states she cannot physically lift ) .       Objective:     Patient found with: (telemetry)     General Precautions: Standard, aspiration, vision impaired, fall   Orthopedic Precautions:(NWB LLE)   Braces: N/A     Exams:  · Cognitive Exam:  Patient is oriented to Person, Place and Situation  · Fine Motor Coordination: -       Intact  · Gross Motor Coordination:  WFL  · RLE ROM: WFL  · RLE Strength: WFL  · LLE ROM: WFL except knee/ankle not assessed  · LLE Strength: WFL except knee/ankle not assessed    Functional Mobility:  · Bed Mobility:  Rolling Left:  minimum assistance  · Rolling Right: minimum assistance  · Scooting: moderate assistance  · Supine to Sit: maximal assistance  · Sit to Supine: maximal assistance  · Transfers:  Sit to Stand:  maximal assistance and of 2  persons with rolling walker and unalbe to come to complete stand. PT with foot under LLE and pt unable to maintain WB precuations at this time during  attempts  · Balance: static sitting fair + ; pt with intailt post lean; dynamic balance in sitting: fair, pt wiht post lean durin RLE MMT assessmetn but no LOB   · Standing balance: total to maintain with 2 person A    AM-PAC 6 CLICK MOBILITY  Total Score:11       · Therapeutic Activities and Exercises:  ·   ·  Whiteboard updated in patients room to current assistance level  · All of patients questions were answered within the scope of PT      · Rolling x 2 trials each direction for pericare with min A for UE initiation   and placement  · Sit to stand x 2 trials max X 2 with RW; unable to maintain WB precautions to come to compete stand     Patient education  · Patient educated on the role of PT and POC  · Patient educated on importance  activity while in the hosptial per tolerance for improved endurance and to limit deconditioning   · Patient educated on safe transfers with nursing as appropriate  · Patient educated on energy conservation, pursed lip breathing  · Patient educated on proper transfer mechanics and safety        Patient left HOB elevated with all lines intact, call button in reach and BLE elevated with ice present.    GOALS:   Multidisciplinary Problems     Physical Therapy Goals        Problem: Physical Therapy Goal    Goal Priority Disciplines Outcome Goal Variances Interventions   Physical Therapy Goal     PT, PT/OT Ongoing (interventions implemented as appropriate)     Description:  Goals to be met by: 9/10/18     Patient will increase functional independence with mobility by performin. Supine to sit with Contact Guard Assistance  2. Sit to supine with Contact Guard Assistance  3. Sit to stand transfer with Moderate Assistance  4. Bed to chair transfer with Maximum Assistance using Rolling Walker or most appropriate device  5. Lower extremity exercise program x 20  reps per handout, with independence                      History:     Past Medical History:   Diagnosis Date    Arthritis      Cataract     Essential hypertension 9/5/2018    Macular degeneration        Past Surgical History:   Procedure Laterality Date    CATARACT EXTRACTION  2003    LEFT EYE       Clinical Decision Making:     History  Co-morbidities and personal factors that may impact the plan of care Examination  Body Structures and Functions, activity limitations and participation restrictions that may impact the plan of care Clinical Presentation   Decision Making/ Complexity Score   Co-morbidities:   [] Time since onset of injury / illness / exacerbation  [] Status of current condition  []Patient's cognitive status and safety concerns    [x] Multiple Medical Problems (see med hx)  Personal Factors:   [x] Patient's age  [] Prior Level of function   [] Patient's home situation (environment and family support)  [] Patient's level of motivation  [] Expected progression of patient      HISTORY:(criteria)    [] 84278 - no personal factors/history    [x] 54060 - has 1-2 personal factor/comorbidity     [] 97373 - has >3 personal factor/comorbidity     Body Regions:  [] Objective examination findings  [] Head     []  Neck  [] Trunk   [] Upper Extremity  [x] Lower Extremity    Body Systems:  [] For communication ability, affect, cognition, language, and learning style: the assessment of the ability to make needs known, consciousness, orientation (person, place, and time), expected emotional /behavioral responses, and learning preferences (eg, learning barriers, education  needs)  [x] For the neuromuscular system: a general assessment of gross coordinated movement (eg, balance, gait, locomotion, transfers, and transitions) and motor function  (motor control and motor learning)  [x] For the musculoskeletal system: the assessment of gross symmetry, gross range of motion, gross strength, height, and weight  [] For the integumentary system: the assessment of pliability(texture), presence of scar formation, skin color, and skin integrity  [] For  cardiovascular/pulmonary system: the assessment of heart rate, respiratory rate, blood pressure, and edema     Activity limitations:    [] Patient's cognitive status and saf ety concerns          [] Status of current condition      [] Weight bearing restriction  [] Cardiopulmunary Restriction    Participation Restrictions:   [] Goals and goal agreement with the patient     [] Rehab potential (prognosis) and probable outcome      Examination of Body System: (criteria)    [] 84606 - addressing 1-2 elements    [x] 90343 - addressing a total of 3 or more elements     [] 22346 -  Addressing a total of 4 or more elements         Clinical Presentation: (criteria)  Stable - 17433     On examination of body system using standardized tests and measures patient presents with 1-2 elements from any of the following: body structures and functions, activity limitations, and/or participation restrictions.  Leading to a clinical presentation that is considered stable and/or uncomplicated                              Clinical Decision Making  (Eval Complexity):  Low- 44575     Time Tracking:     PT Received On: 09/10/18  PT Start Time: 1012     PT Stop Time: 1038  PT Total Time (min): 26 min     Billable Minutes: Evaluation 15 min and Therapeutic Activity  8 min      Michael Kumar, PT  09/10/2018

## 2018-09-10 NOTE — CONSULTS
[facility]  Physical Medicine & Rehab  Consult Note    Patient Name: Mitzy Perez  MRN: [id number]  Admission Date: 9/8/2018  Hospital Length of Stay: 0 days  Attending Physician: Karyna Vaughn MD     Inpatient consult to Physical Medicine & Rehabilitation  Consult performed by: Alma Meza NP  Consult requested by:  Karyna Vaughn MD    Collaborating Physician: Haylie Roberts MD  Reason for Consult:  Assess rehabilitation needs     Consults  Subjective:     Principal Problem: Fall    HPI: Mitzy Perez is a 85-year-old female with PMHx of HTN and macular degeneration. Patient presented to [facility] on 9/5/18 with altered mental staus, double vision, and hallucinations. MRI with scattered punctate areas of restricted diffusion. Discharged 9/8/18 and recommending IRF. Family opted for home health and upon return to home with [facility] had fall and was readmitted with L ankle pain from mechanical fall at home. X-ray revealed left trimal ankle fracture with dislocation. Splinted in ER and scheduled for Surgery tomorrow pending swelling.     Functional History: Patient lives with daughter single story home with 0 steps to enter.  Prior to admission, (I) with ADLs and mobility, no driving 2/2 to macular degeneration. DME: none.     Hospital Course:   9/9/18: SLP recommendation: regular diet and thin liquids. SLP diagnosis of Dysphagia    Past Medical History:   Diagnosis Date    Arthritis     Cataract     Essential hypertension 9/5/2018    Macular degeneration      Past Surgical History:   Procedure Laterality Date    CATARACT EXTRACTION  2003    LEFT EYE     Review of patient's allergies indicates:  No Known Allergies    Scheduled Medications:    albuterol-ipratropium  3 mL Nebulization Once    amLODIPine  5 mg Oral Daily    aspirin  81 mg Oral QHS    atorvastatin  40 mg Oral Daily    carvedilol  6.25 mg Oral BID    lisinopril  20 mg Oral Daily    sodium chloride 0.9%  3 mL Intravenous  Q8H       PRN Medications: acetaminophen, ondansetron, ondansetron, traMADol    Family History     Problem Relation (Age of Onset)    Retinal detachment Sister    Thyroid disease Sister        Tobacco Use    Smoking status: Never Smoker    Smokeless tobacco: Never Used   Substance and Sexual Activity    Alcohol use: No    Drug use: Not on file    Sexual activity: Not on file     Review of Systems   Constitutional: Negative for fatigue and fever.   HENT: Negative for sore throat and trouble swallowing.    Eyes: Positive for visual disturbance.   Respiratory: Negative for cough and shortness of breath.    Cardiovascular: Negative for chest pain and leg swelling.   Gastrointestinal: Negative for abdominal distention and abdominal pain.   Genitourinary: Negative for difficulty urinating.   Musculoskeletal: Positive for gait problem. Negative for back pain.   Skin: Negative for color change.   Neurological: Negative for dizziness and headaches.   Psychiatric/Behavioral: Positive for confusion. Negative for agitation.     Objective:     Vital Signs (Most Recent):  Temp: 98.5 °F (36.9 °C) (09/10/18 1139)  Pulse: (!) 58 (09/10/18 1139)  Resp: 18 (09/10/18 1139)  BP: 139/60 (09/10/18 1139)  SpO2: (!) 93 % (09/10/18 1139)    Vital Signs (24h Range):  Temp:  [97.6 °F (36.4 °C)-98.6 °F (37 °C)] 98.5 °F (36.9 °C)  Pulse:  [56-65] 58  Resp:  [16-19] 18  SpO2:  [91 %-95 %] 93 %  BP: (130-189)/(58-77) 139/60     Body mass index is 37.31 kg/m².    Physical Exam   Constitutional: She appears well-developed and well-nourished.   Eyes: EOM are normal. Pupils are equal, round, and reactive to light.   Neck: Normal range of motion. Neck supple.   Cardiovascular: Normal rate and regular rhythm.   Pulmonary/Chest: Effort normal and breath sounds normal.   Abdominal: Soft. Normal appearance and bowel sounds are normal. She exhibits no abdominal bruit. There is no rigidity.   Musculoskeletal: Normal range of motion.   LLE splint intact     Neurological: She is alert.   -  Mental Status: AAOx3. Follows commands.   -  Speech and language: dysarthria present.    -  Vision: difficult with macular degeneration    -  Facial movement (CN VII): symmetrical  -  Sensory:  Intact to light touch    Skin: Skin is warm and dry.   Psychiatric: She has a normal mood and affect.     Diagnostic Results:   Labs: Reviewed  ECG: Reviewed  X-Ray: Reviewed    Assessment/Plan:     * Fall    - left trimal ankle fracture with dislocation    Recommendations  -  Encourage mobility, OOB in chair at least 3 hours per day, and early ambulation as appropriate  -  PT/OT evaluate and treat  -  Pain management  -  Monitor for and prevent skin breakdown and pressure ulcers  · Early mobility, repositioning/weight shifting every 20-30 minutes when sitting, turn patient every 2 hours, proper mattress/overlay and chair cushioning, pressure relief/heel protector boots  -  DVT prophylaxis    -  Reviewed discharge options (IP rehab, SNF, HH therapy, and OP therapy)        Trimalleolar fracture of left ankle    - X-ray revealed left trimal ankle fracture with dislocation.   - Splinted in ER and scheduled for Surgery tomorrow pending swelling.             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     See hospital course for functional, cognitive/speech/language, and nutrition/swallow status.      Recommendations  -  Encourage mobility, OOB in chair at least 3 hours per day, and early ambulation as appropriate   -  PT/OT evaluate and treat  -  SLP speech and cognitive evaluate and treat  -  Monitor sleep disturbances and establish consistent sleep-wake cycle  -  Monitor for bowel and bladder dysfunction  -  Monitor for shoulder pain and subluxation  -  Monitor for spasticity  -  Monitor for and prevent skin breakdown and pressure ulcers  · Early mobility, repositioning/weight shifting every 20-30 minutes  when sitting, turn patient every 2 hours, proper mattress/overlay and chair cushioning, pressure relief/heel protector boots  -  DVT prophylaxis  -  Reviewed discharge options (IP rehab, SNF, HH therapy, and OP therapy)          Recommend Inpatient Rehab postoperatively once medically stable pending OT and PT evaluations. IRF preference per patient/[facility].       Thank you for your consult.     Alma Meza NP  Department of Physical Medicine & Rehab  [facility]

## 2018-09-10 NOTE — PLAN OF CARE
MALIKA was initially informed that the pt can dc to [facility] today once accepted.  Pt will come back for surgery on Friday.  MALIKA spoke with the admissions person.  He stated that they would have to review.  MALIKA was informed that they think the pt should come after the surgery and not in between but that their Dr could talk with the Dr here.  MALIKA was informed by the PA that the pt will now have surgery tomorrow and will go to [facility] after therapy sees her following the surgery.  MALIKA will f/u as needed.    Fadumo Mayes, [phone]

## 2018-09-10 NOTE — PROGRESS NOTES
[facility]  Orthopedics  Progress Note    Attg Note:  Patient seen and examined.  I agree with the above assessment and plan.  Rehab wants us to fix ankle before she goes.  Swelling much better than yesterday.  Will fix tomorrow.  The risks, benefits and alternatives to surgery were discussed with the patient and her family at great length.  These include bleeding, infection, vessel/nerve damage, pain, numbness, tingling, complex regional pain syndrome, hardware/surgical failure, need for further surgery, malunion, nonunion, DVT, PE, arthritis and death.  Patient and family state an understanding and wishes to proceed with surgery.   All questions were answered.  No guarantees were implied or stated.  Informed consent was obtained.    Dontae Negro MD      Patient Name: Mitzy Perez  MRN: [id number]  Admission Date: 9/8/2018  Hospital Length of Stay: 0 days  Attending Provider: Karyna Vaughn MD  Primary Care Provider: Marta Michael MD    Subjective:     Principal Problem: L trimalleolar ankle fracture    Principal Orthopedic Problem: same    Interval History: Patient seen and examined at bedside.  No acute events overnight.  Pain controlled. Patient without complaints.       Review of patient's allergies indicates:  No Known Allergies    Current Facility-Administered Medications   Medication    acetaminophen tablet 650 mg    amLODIPine tablet 5 mg    aspirin EC tablet 81 mg    atorvastatin tablet 40 mg    carvedilol tablet 6.25 mg    enoxaparin injection 30 mg    lisinopril tablet 20 mg    ondansetron disintegrating tablet 8 mg    ondansetron injection 4 mg    sodium chloride 0.9% bolus 500 mL    sodium chloride 0.9% flush 3 mL    traMADol tablet 50 mg     Objective:     Vital Signs (Most Recent):  Temp: 98.6 °F (37 °C) (09/10/18 0458)  Pulse: 65 (09/10/18 0458)  Resp: 16 (09/10/18 0458)  BP: (!) 161/72 (09/10/18 0458)  SpO2: (!) 94 % (09/10/18 0458) Vital Signs (24h  "Range):  Temp:  [97.6 °F (36.4 °C)-98.6 °F (37 °C)] 98.6 °F (37 °C)  Pulse:  [54-65] 65  Resp:  [16-18] 16  SpO2:  [91 %-95 %] 94 %  BP: (127-161)/(55-75) 161/72        Height: 4' 9" (144.8 cm)  Body mass index is 37.31 kg/m².      Intake/Output Summary (Last 24 hours) at 9/10/2018 0758  Last data filed at 9/10/2018 0600  Gross per 24 hour   Intake 210 ml   Output --   Net 210 ml       Ortho/SPM Exam     MSK:   Splint in place.  SILT DP/SP/BARNEY  Motor intact EHL/FHL  Brisk cap refill  Warm well perfused extremities  palpable    Significant Labs:   CBC:   Recent Labs   Lab  09/08/18 0821 09/09/18   0333  09/10/18   0314   WBC  7.56  9.41  9.54   HGB  11.7*  10.9*  12.0   HCT  36.8*  33.5*  35.4*   PLT  175  162  194     CMP:   Recent Labs   Lab  09/08/18   0821 09/09/18   0333  09/10/18   0314   NA  140  136  136   K  4.2  4.2  4.3   CL  109  105  106   CO2  24  24  22*   GLU  90  93  104   BUN  27*  36*  45*   CREATININE  1.2  1.4  1.5*   CALCIUM  9.3  8.8  9.0   PROT  7.1  6.8  7.1   ALBUMIN  2.6*  2.5*  2.5*   BILITOT  0.8  0.8  0.6   ALKPHOS  123  118  118   AST  58*  53*  44*   ALT  24  22  22   ANIONGAP  7*  7*  8   EGFRNONAA  41.3*  34.3*  31.5*     All pertinent labs within the past 24 hours have been reviewed.    Significant Imaging: I have reviewed all pertinent imaging results/findings.    Assessment/Plan:     Trimalleolar fracture of left ankle    Mitzy Perez is a 85 y.o. female with left trimal ankle fracture dislocation, closed reduction 9/9/18  -PT/OT: NWB LLE  - Pt tentatively scheduled for OR Friday, pending skin check Thursday   - encouraged aggressive Ice and elevation LLE  - dispo: awaiting acceptance/transfer to [facility]               Dagmar Wong MD  Orthopedics  [facility]  "

## 2018-09-10 NOTE — ASSESSMENT & PLAN NOTE
Mitzy Perez is a 85 y.o. female with left trimal ankle fracture dislocation, closed reduction 9/9/18  -PT/OT: NWALTON LLE  - Pt tentatively scheduled for OR Friday, pending skin check Thursday   - encouraged aggressive Ice and elevation LLE  - dispo: awaiting acceptance/transfer to [facility]

## 2018-09-10 NOTE — ANESTHESIA PREPROCEDURE EVALUATION
09/10/2018  Mitzy Perez is a 85 y.o., female.  Pre-operative evaluation for Procedure(s) (LRB):  ORIF, ANKLE (Left)    Mitzy Perez is a 85 y.o. female with PMHx of HTN, HLD, macular degeneration admitted altered mental status, diplopia, hallucinations found to have multifocal stroke and left trimal fracture with dislocation, patient presents for the above procedure.    On 1L NC saturation 91-95%, CXR w/ interstitial lung markings, and soft tissue attenuation versus airspace disease at the left lung base. Mild AS on echo this admission.      LDA:   Left anticubital 18G    Prev airway: None documented    Drips: None    Patient Active Problem List   Diagnosis    History of blurred vision - Both Eyes    Senile nuclear sclerosis - Right Eye    ARMD (age related macular degeneration) - Both Eyes    Pseudophakia, left eye    Better eye: profound vision impairment; lesser eye: near-total vision impairment    Cat bite of ankle, left, initial encounter    Cat bite of left lower leg with infection    Immunization deficiency    Subacute vaginitis    Elevated troponin    Acute ischemic multifocal multiple vascular territories stroke    Essential hypertension    Mixed hyperlipidemia    Asymptomatic stenosis of right carotid artery    Visual hallucination    Fall    Trimalleolar fracture of left ankle       Review of patient's allergies indicates:  No Known Allergies     No current facility-administered medications on file prior to encounter.      Current Outpatient Medications on File Prior to Encounter   Medication Sig Dispense Refill    amLODIPine (NORVASC) 5 MG tablet Take 1 tablet (5 mg total) by mouth once daily. 30 tablet 11    aspirin (ECOTRIN) 81 MG EC tablet Take 81 mg by mouth every evening.       atorvastatin (LIPITOR) 40 MG tablet Take 1 tablet (40 mg total) by mouth once daily. 90  tablet 3    BETA-CAROTENE,A, W-C & E/MIN (ICAPS ORAL) Take 1 tablet by mouth once daily.      carvedilol (COREG) 6.25 MG tablet Take 1 tablet (6.25 mg total) by mouth 2 (two) times daily. 60 tablet 11    fish oil-omega-3 fatty acids 300-1,000 mg capsule Take 1 capsule by mouth once daily.       lisinopril (PRINIVIL,ZESTRIL) 20 MG tablet Take 1 tablet (20 mg total) by mouth once daily. 90 tablet 3       Past Surgical History:   Procedure Laterality Date    CATARACT EXTRACTION  2003    LEFT EYE       Social History     Socioeconomic History    Marital status:      Spouse name: Not on file    Number of children: Not on file    Years of education: Not on file    Highest education level: Not on file   Social Needs    Financial resource strain: Not on file    Food insecurity - worry: Not on file    Food insecurity - inability: Not on file    Transportation needs - medical: Not on file    Transportation needs - non-medical: Not on file   Occupational History    Not on file   Tobacco Use    Smoking status: Never Smoker    Smokeless tobacco: Never Used   Substance and Sexual Activity    Alcohol use: No    Drug use: Not on file    Sexual activity: Not on file   Other Topics Concern    Not on file   Social History Narrative    Not on file         Vital Signs Range (Last 24H):  Temp:  [36.4 °C (97.6 °F)-37 °C (98.6 °F)]   Pulse:  [56-65]   Resp:  [16-19]   BP: (130-189)/(58-77)   SpO2:  [91 %-95 %]       CBC:   Recent Labs      09/09/18   0333  09/10/18   0314   WBC  9.41  9.54   RBC  3.62*  3.90*   HGB  10.9*  12.0   HCT  33.5*  35.4*   PLT  162  194   MCV  93  91   MCH  30.1  30.8   MCHC  32.5  33.9       CMP:   Recent Labs      09/08/18   0449   09/09/18   0333  09/10/18   0314   NA   --    < >  136  136   K   --    < >  4.2  4.3   CL   --    < >  105  106   CO2   --    < >  24  22*   BUN   --    < >  36*  45*   CREATININE   --    < >  1.4  1.5*   GLU   --    < >  93  104   MG  2.0   --    --     --    PHOS  3.3   --    --    --    CALCIUM   --    < >  8.8  9.0   ALBUMIN   --    < >  2.5*  2.5*   PROT   --    < >  6.8  7.1   ALKPHOS   --    < >  118  118   ALT   --    < >  22  22   AST   --    < >  53*  44*   BILITOT   --    < >  0.8  0.6    < > = values in this interval not displayed.       INR  Recent Labs      09/08/18   0251   INR  1.1   APTT  22.4           Diagnostic Studies: CXR    FINDINGS:  Cardiac silhouette is significantly enlarged but stable.  Atherosclerotic calcifications overlie the aortic arch.  There are coarsened interstitial lung markings, and soft tissue attenuation versus airspace disease at the left lung base.  No detrimental change in lung aeration.      Impression       No detrimental change when compared with 09/05/2018.      Electronically signed by: Vincent Griggs MD  Date: 09/08/2018  Time: 03:09          EKG:  Vent. Rate : 076 BPM     Atrial Rate : 076 BPM     P-R Int : 152 ms          QRS Dur : 082 ms      QT Int : 404 ms       P-R-T Axes : 055 000 049 degrees     QTc Int : 454 ms    Normal sinus rhythm  Nonspecific ST and T wave abnormality  Abnormal ECG  When compared with ECG of 05-SEP-2018 14:27,  No significant change was found  Confirmed by Jean-Pierre DIEGO MD ([id number]) on 9/6/2018 11:31:47 AM    2D Echo:  CONCLUSIONS     1 - Hyperdynamic left ventricular systolic function (EF >70%).     2 - Normal right ventricular systolic function .     3 - Severe left atrial enlargement.     4 - Mild aortic stenosis, CROW = 1.71 cm2, AVAi = 1.01 cm2/m2, peak velocity = 2.9 m/s, mean gradient = 15 mmHg.             This document has been electronically    SIGNED BY: Srinivasa Dave MD On: 09/06/2018 11:00              Anesthesia Evaluation    I have reviewed the Patient Summary Reports.    I have reviewed the Nursing Notes.   I have reviewed the Medications.     Review of Systems  Anesthesia Hx:  Denies Family Hx of Anesthesia complications.   Denies Personal Hx of Anesthesia complications.    Social:  Non-Smoker    Cardiovascular:   Hypertension    Musculoskeletal:   Tri mal fracture   Neurological:   CVA Macular degeneration       Physical Exam  General:  Well nourished    Airway/Jaw/Neck:  Airway Findings: Mouth Opening: Normal Tongue: Normal  General Airway Assessment: Adult  Mallampati: III  TM Distance: 4 - 6 cm  Jaw/Neck Findings:  Neck ROM: Normal ROM     Eyes/Ears/Nose:  EYES/EARS/NOSE FINDINGS: Normal   Dental:  Dental Findings: Periodontal disease, Severe, molar caps    Chest/Lungs:  Chest/Lungs Findings: Decreased Breathe Sounds Left, Rales, Basilar     Heart/Vascular:  Heart Findings: Rate: Normal  Rhythm: Regular Rhythm  Sounds: Normal     Abdomen:  Abdomen Findings:  Soft, Nontender      Skin:  Skin Findings: Normal    Mental Status:  Mental Status Findings:  Cooperative         Anesthesia Plan  Type of Anesthesia, risks & benefits discussed:  Anesthesia Type:  general, MAC  Patient's Preference:   Intra-op Monitoring Plan: standard ASA monitors  Intra-op Monitoring Plan Comments:   Post Op Pain Control Plan: multimodal analgesia, IV/PO Opioids PRN and per primary service following discharge from PACU  Post Op Pain Control Plan Comments:   Induction:   IV  Beta Blocker:         Informed Consent: Patient representative understands risks and agrees with Anesthesia plan.  Questions answered. Anesthesia consent signed with patient representative.  ASA Score: 3     Day of Surgery Review of History & Physical:            Ready For Surgery From Anesthesia Perspective.

## 2018-09-10 NOTE — SUBJECTIVE & OBJECTIVE
"Principal Problem: L trimalleolar ankle fracture    Principal Orthopedic Problem: same    Interval History: Patient seen and examined at bedside.  No acute events overnight.  Pain controlled. Patient without complaints.       Review of patient's allergies indicates:  No Known Allergies    Current Facility-Administered Medications   Medication    acetaminophen tablet 650 mg    amLODIPine tablet 5 mg    aspirin EC tablet 81 mg    atorvastatin tablet 40 mg    carvedilol tablet 6.25 mg    enoxaparin injection 30 mg    lisinopril tablet 20 mg    ondansetron disintegrating tablet 8 mg    ondansetron injection 4 mg    sodium chloride 0.9% bolus 500 mL    sodium chloride 0.9% flush 3 mL    traMADol tablet 50 mg     Objective:     Vital Signs (Most Recent):  Temp: 98.6 °F (37 °C) (09/10/18 0458)  Pulse: 65 (09/10/18 0458)  Resp: 16 (09/10/18 0458)  BP: (!) 161/72 (09/10/18 0458)  SpO2: (!) 94 % (09/10/18 0458) Vital Signs (24h Range):  Temp:  [97.6 °F (36.4 °C)-98.6 °F (37 °C)] 98.6 °F (37 °C)  Pulse:  [54-65] 65  Resp:  [16-18] 16  SpO2:  [91 %-95 %] 94 %  BP: (127-161)/(55-75) 161/72        Height: 4' 9" (144.8 cm)  Body mass index is 37.31 kg/m².      Intake/Output Summary (Last 24 hours) at 9/10/2018 0758  Last data filed at 9/10/2018 0600  Gross per 24 hour   Intake 210 ml   Output --   Net 210 ml       Ortho/SPM Exam     MSK:   Splint in place.  SILT DP/SP/BARNEY  Motor intact EHL/FHL  Brisk cap refill  Warm well perfused extremities  palpable    Significant Labs:   CBC:   Recent Labs   Lab  09/08/18   0821  09/09/18   0333  09/10/18   0314   WBC  7.56  9.41  9.54   HGB  11.7*  10.9*  12.0   HCT  36.8*  33.5*  35.4*   PLT  175  162  194     CMP:   Recent Labs   Lab  09/08/18   0821  09/09/18   0333  09/10/18   0314   NA  140  136  136   K  4.2  4.2  4.3   CL  109  105  106   CO2  24  24  22*   GLU  90  93  104   BUN  27*  36*  45*   CREATININE  1.2  1.4  1.5*   CALCIUM  9.3  8.8  9.0   PROT  7.1  6.8  7.1 "   ALBUMIN  2.6*  2.5*  2.5*   BILITOT  0.8  0.8  0.6   ALKPHOS  123  118  118   AST  58*  53*  44*   ALT  24  22  22   ANIONGAP  7*  7*  8   EGFRNONAA  41.3*  34.3*  31.5*     All pertinent labs within the past 24 hours have been reviewed.    Significant Imaging: I have reviewed all pertinent imaging results/findings.

## 2018-09-10 NOTE — ASSESSMENT & PLAN NOTE
- X-ray revealed left trimal ankle fracture with dislocation.   - Splinted in ER and scheduled for Surgery tomorrow pending swelling.

## 2018-09-10 NOTE — SUBJECTIVE & OBJECTIVE
Interval History: No acute events overnight. This AM, pt sleeping in bed with family at bedside. Pt easily aroused, alert and oriented x4. Pt reports minimal pain to LLE. Discussed plan of care with patient and family.     Review of Systems   Constitutional: Positive for fatigue. Negative for chills, diaphoresis and fever.   Eyes: Positive for visual disturbance (chronic; no changes).   Respiratory: Negative for cough, chest tightness, shortness of breath and wheezing.    Cardiovascular: Negative for chest pain, palpitations and leg swelling.   Gastrointestinal: Negative for abdominal distention, abdominal pain, diarrhea, nausea and vomiting.   Musculoskeletal: Positive for myalgias. Negative for back pain and gait problem.        L ankle pain   Neurological: Negative for dizziness, seizures, syncope, speech difficulty, light-headedness, numbness and headaches.   Psychiatric/Behavioral: Negative for agitation, confusion, dysphoric mood and hallucinations. The patient is not nervous/anxious.      Objective:     Vital Signs (Most Recent):  Temp: 98.5 °F (36.9 °C) (09/10/18 1139)  Pulse: (!) 58 (09/10/18 1139)  Resp: 18 (09/10/18 1139)  BP: 139/60 (09/10/18 1139)  SpO2: (!) 93 % (09/10/18 1139) Vital Signs (24h Range):  Temp:  [97.6 °F (36.4 °C)-98.6 °F (37 °C)] 98.5 °F (36.9 °C)  Pulse:  [56-65] 58  Resp:  [16-19] 18  SpO2:  [91 %-95 %] 93 %  BP: (130-189)/(58-77) 139/60        Body mass index is 37.31 kg/m².    Intake/Output Summary (Last 24 hours) at 9/10/2018 1322  Last data filed at 9/10/2018 0600  Gross per 24 hour   Intake 210 ml   Output --   Net 210 ml      Physical Exam   Constitutional: She is oriented to person, place, and time. She appears well-developed and well-nourished. No distress.   Cardiovascular: Normal rate, regular rhythm, normal heart sounds and intact distal pulses.   Pulmonary/Chest: Effort normal and breath sounds normal. No stridor. No respiratory distress.   Abdominal: Soft. Bowel sounds  are normal. She exhibits no distension. There is no tenderness.   Musculoskeletal:   LLE splinted; CDI; sensation intact; able to move toes   Neurological: She is alert and oriented to person, place, and time. No cranial nerve deficit.   Skin: Skin is warm and dry. She is not diaphoretic. No erythema.   Psychiatric: She has a normal mood and affect. Her behavior is normal.       Significant Labs: All pertinent labs within the past 24 hours have been reviewed.    Significant Imaging: I have reviewed all pertinent imaging results/findings within the past 24 hours.

## 2018-09-10 NOTE — ASSESSMENT & PLAN NOTE
- 9/5/18 MRI with scattered punctate areas of restricted diffusion.     See hospital course for functional, cognitive/speech/language, and nutrition/swallow status.      Recommendations  -  Encourage mobility, OOB in chair at least 3 hours per day, and early ambulation as appropriate   -  PT/OT evaluate and treat  -  SLP speech and cognitive evaluate and treat  -  Monitor sleep disturbances and establish consistent sleep-wake cycle  -  Monitor for bowel and bladder dysfunction  -  Monitor for shoulder pain and subluxation  -  Monitor for spasticity  -  Monitor for and prevent skin breakdown and pressure ulcers  · Early mobility, repositioning/weight shifting every 20-30 minutes when sitting, turn patient every 2 hours, proper mattress/overlay and chair cushioning, pressure relief/heel protector boots  -  DVT prophylaxis  -  Reviewed discharge options (IP rehab, SNF, HH therapy, and OP therapy)

## 2018-09-10 NOTE — PLAN OF CARE
Problem: Physical Therapy Goal  Goal: Physical Therapy Goal  Goals to be met by: 9/10/18     Patient will increase functional independence with mobility by performin. Supine to sit with Contact Guard Assistance  2. Sit to supine with Contact Guard Assistance  3. Sit to stand transfer with Moderate Assistance  4. Bed to chair transfer with Maximum Assistance using Rolling Walker or most appropriate device  5. Lower extremity exercise program x 20  reps per handout, with independence    Outcome: Ongoing (interventions implemented as appropriate)  Patient evaluated today. All goals established are appropriate for patient progression at this time.   Michael Kumar PT, DPT  9/10/2018  Pager: [phone]

## 2018-09-10 NOTE — HOSPITAL COURSE
9/9/18: SLP recommendation: regular diet and thin liquids. SLP diagnosis of Dysphagia  9/11/18: Participated with therapy. Bed mobility Keith- maxA. Sit to stand maxA x 2ppl w/ RW. LBD totalA.

## 2018-09-10 NOTE — ASSESSMENT & PLAN NOTE
- left trimal ankle fracture with dislocation    Recommendations  -  Encourage mobility, OOB in chair at least 3 hours per day, and early ambulation as appropriate  -  PT/OT evaluate and treat  -  Pain management  -  Monitor for and prevent skin breakdown and pressure ulcers  · Early mobility, repositioning/weight shifting every 20-30 minutes when sitting, turn patient every 2 hours, proper mattress/overlay and chair cushioning, pressure relief/heel protector boots  -  DVT prophylaxis    -  Reviewed discharge options (IP rehab, SNF, HH therapy, and OP therapy)

## 2018-09-10 NOTE — PLAN OF CARE
Problem: Fall Risk (Adult)  Goal: Absence of Falls  Patient will demonstrate the desired outcomes by discharge/transition of care.  Outcome: Ongoing (interventions implemented as appropriate)   09/10/18 0635   Fall Risk (Adult)   Absence of Falls making progress toward outcome       Problem: Patient Care Overview  Goal: Plan of Care Review  Outcome: Ongoing (interventions implemented as appropriate)   09/10/18 0635   Coping/Psychosocial   Plan Of Care Reviewed With patient   Weight shift assistance provided q2h, LLE splinted, dressing c/d/i, sensation to LLE intact, pt able to move toes, LLE elevated as ordered and ice applied as ordered, no distress/discomfort noted in pt.   MAGALY Saldana notified of pt's O2 sat at 91% on room air, orders received to place pt on O2, pt placed on O2 at 1L, no SOB/distress/discomfort reported/noted, pt Family present at pt's side, all precautions maintained, will continue to monitor pt

## 2018-09-10 NOTE — PROGRESS NOTES
pt O2 sat 91% on room air, pt denies any SOB/discomfort, no distress/discomfort noted in pt, family present at bedside, IM F paged, pending call back.

## 2018-09-10 NOTE — PROGRESS NOTES
[facility] Hospital Medicine  Progress Note    Patient Name: Mitzy Perez  MRN: [id number]  Patient Class: OP- Observation   Admission Date: 9/8/2018  Length of Stay: 0 days  Attending Physician: Karyna Vaughn MD  Primary Care Provider: Marta Michael MD    [facility] Medicine Team: [facility] HOSP MED F Erika Saldana PA-C    Subjective:     Principal Problem:Fall    HPI:  85 year old female with a PMHx of HTN, macular degeneration, and recent CVA (admitted 9/5-9/7) presenting with L ankle pain s/p mechanical fall at home. Pt was just discharged from [facility] yesterday after admission for CVA. During that admission, recommendations were for patient to go to inpatient rehab; family opted for patient to return home with [facility] and [facility] care. Pt arrived home ~7pm when she went to the bathroom. Per granddaughter, pt did not call for assistance. While in the bathroom, patient slipped while reaching to put toilet seat down. Pt denies prodromal symptoms, including chest pain, SOB, lightheadedness, vision/speech changes. She denies LOC and head injury. Family reports they heard her fall and came to her aid immediately. EMS called to transport her to hospital. At the time of my exam, pt only endorses minimal pain to LLE. Denies chest pain, SOB, abdominal pain, N/V/D, fever/chills, vision/speech changes.       In the ED, HDS. Imaging revealed displaced fracture of the lateral malleolus with anterior and medial dislocation of the tibia. Ortho surgery reduced and splinted in the ER; rec NWB LLE; plan for outpatient surgery. Patient admitted for rehab placement.    Hospital Course:  Pt admitted for rehab placement after mechanical fall at home resulting in trimalleolar fracture of L ankle. Ortho surg rec NWB LLE, ice/elevation. PT/OT/SLP consulted. Per ortho surg, plan for OR 9/11. NPO at [facility]. Anticipate discharge to [facility].     Interval History: No acute events overnight. This AM, pt sleeping in bed with family  at bedside. Pt easily aroused, alert and oriented x4. Pt reports minimal pain to LLE. Discussed plan of care with patient and family.     Review of Systems   Constitutional: Positive for fatigue. Negative for chills, diaphoresis and fever.   Eyes: Positive for visual disturbance (chronic; no changes).   Respiratory: Negative for cough, chest tightness, shortness of breath and wheezing.    Cardiovascular: Negative for chest pain, palpitations and leg swelling.   Gastrointestinal: Negative for abdominal distention, abdominal pain, diarrhea, nausea and vomiting.   Musculoskeletal: Positive for myalgias. Negative for back pain and gait problem.        L ankle pain   Neurological: Negative for dizziness, seizures, syncope, speech difficulty, light-headedness, numbness and headaches.   Psychiatric/Behavioral: Negative for agitation, confusion, dysphoric mood and hallucinations. The patient is not nervous/anxious.      Objective:     Vital Signs (Most Recent):  Temp: 98.5 °F (36.9 °C) (09/10/18 1139)  Pulse: (!) 58 (09/10/18 1139)  Resp: 18 (09/10/18 1139)  BP: 139/60 (09/10/18 1139)  SpO2: (!) 93 % (09/10/18 1139) Vital Signs (24h Range):  Temp:  [97.6 °F (36.4 °C)-98.6 °F (37 °C)] 98.5 °F (36.9 °C)  Pulse:  [56-65] 58  Resp:  [16-19] 18  SpO2:  [91 %-95 %] 93 %  BP: (130-189)/(58-77) 139/60        Body mass index is 37.31 kg/m².    Intake/Output Summary (Last 24 hours) at 9/10/2018 1322  Last data filed at 9/10/2018 0600  Gross per 24 hour   Intake 210 ml   Output --   Net 210 ml      Physical Exam   Constitutional: She is oriented to person, place, and time. She appears well-developed and well-nourished. No distress.   Cardiovascular: Normal rate, regular rhythm, normal heart sounds and intact distal pulses.   Pulmonary/Chest: Effort normal and breath sounds normal. No stridor. No respiratory distress.   Abdominal: Soft. Bowel sounds are normal. She exhibits no distension. There is no tenderness.   Musculoskeletal:    LLE splinted; CDI; sensation intact; able to move toes   Neurological: She is alert and oriented to person, place, and time. No cranial nerve deficit.   Skin: Skin is warm and dry. She is not diaphoretic. No erythema.   Psychiatric: She has a normal mood and affect. Her behavior is normal.       Significant Labs: All pertinent labs within the past 24 hours have been reviewed.    Significant Imaging: I have reviewed all pertinent imaging results/findings within the past 24 hours.    Assessment/Plan:      * Fall    Closed displaced fracture of L lateral malleolus  S/p mechanical fall at home  - Discharged from [facility] after admission for CVA (9/5-9/7). During that admission, recommendations were for patient to go to inpatient rehab; family opted for patient to return home with [facility] and [facility]  - Mechanical fall while attempting to go to bathroom without assistance after arriving home  - Imaging revealed displaced fracture of L lateral malleolus with anterior and medial dislocation of the tibia  - Ortho surgery reduced and splinted in the ER; rec NWB LLE and initial plan for outpatient surgery  - Cautious use of opioids in elderly, analgesics PRN  - Family in agreement with patient going to Rehab for continued therapy. Family would like [facility]  - PT/OT/SLP consulted  - Per ortho surg, plan for OR 9/11. NPO at [facility]. Held lovenox.  - Anticipate discharge to [facility]    Pt is intermediate risk for a low risk surgery.  Would recommend continuing ASA and Coreg through surgery. Would also recommend regional anesthesia > general anesthesia if possible.         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 Vaughn

## 2018-09-10 NOTE — SUBJECTIVE & OBJECTIVE
Past Medical History:   Diagnosis Date    Arthritis     Cataract     Essential hypertension 9/5/2018    Macular degeneration      Past Surgical History:   Procedure Laterality Date    CATARACT EXTRACTION  2003    LEFT EYE     Review of patient's allergies indicates:  No Known Allergies    Scheduled Medications:    albuterol-ipratropium  3 mL Nebulization Once    amLODIPine  5 mg Oral Daily    aspirin  81 mg Oral QHS    atorvastatin  40 mg Oral Daily    carvedilol  6.25 mg Oral BID    lisinopril  20 mg Oral Daily    sodium chloride 0.9%  3 mL Intravenous Q8H       PRN Medications: acetaminophen, ondansetron, ondansetron, traMADol    Family History     Problem Relation (Age of Onset)    Retinal detachment Sister    Thyroid disease Sister        Tobacco Use    Smoking status: Never Smoker    Smokeless tobacco: Never Used   Substance and Sexual Activity    Alcohol use: No    Drug use: Not on file    Sexual activity: Not on file     Review of Systems   Constitutional: Negative for fatigue and fever.   HENT: Negative for sore throat and trouble swallowing.    Eyes: Positive for visual disturbance.   Respiratory: Negative for cough and shortness of breath.    Cardiovascular: Negative for chest pain and leg swelling.   Gastrointestinal: Negative for abdominal distention and abdominal pain.   Genitourinary: Negative for difficulty urinating.   Musculoskeletal: Positive for gait problem. Negative for back pain.   Skin: Negative for color change.   Neurological: Negative for dizziness and headaches.   Psychiatric/Behavioral: Positive for confusion. Negative for agitation.     Objective:     Vital Signs (Most Recent):  Temp: 98.5 °F (36.9 °C) (09/10/18 1139)  Pulse: (!) 58 (09/10/18 1139)  Resp: 18 (09/10/18 1139)  BP: 139/60 (09/10/18 1139)  SpO2: (!) 93 % (09/10/18 1139)    Vital Signs (24h Range):  Temp:  [97.6 °F (36.4 °C)-98.6 °F (37 °C)] 98.5 °F (36.9 °C)  Pulse:  [56-65] 58  Resp:  [16-19] 18  SpO2:  [91  %-95 %] 93 %  BP: (130-189)/(58-77) 139/60     Body mass index is 37.31 kg/m².    Physical Exam   Constitutional: She appears well-developed and well-nourished.   Eyes: EOM are normal. Pupils are equal, round, and reactive to light.   Neck: Normal range of motion. Neck supple.   Cardiovascular: Normal rate and regular rhythm.   Pulmonary/Chest: Effort normal and breath sounds normal.   Abdominal: Soft. Normal appearance and bowel sounds are normal. She exhibits no abdominal bruit. There is no rigidity.   Musculoskeletal: Normal range of motion.   LLE splint intact    Neurological: She is alert.   -  Mental Status: AAOx3. Follows commands.   -  Speech and language: dysarthria present.    -  Vision: difficult with macular degeneration    -  Facial movement (CN VII): symmetrical  -  Sensory:  Intact to light touch    Skin: Skin is warm and dry.   Psychiatric: She has a normal mood and affect.     NEUROLOGICAL EXAMINATION:     CRANIAL NERVES     CN III, IV, VI   Pupils are equal, round, and reactive to light.  Extraocular motions are normal.       Diagnostic Results:   Labs: Reviewed  ECG: Reviewed  X-Ray: Reviewed

## 2018-09-10 NOTE — PLAN OF CARE
Patient resting quietly in bed with granddaughter, April Patricia ([phone]), at the bedside when CM rounded. Patient was admitted following a mechanical fall at home. Plan to discharge patient to [facility] when medically stable. Message sent to Dr. Marta Michael (PCP) requesting a hospital follow up appointment in 1-2 weeks. Dr. Michael's nurse to call the patient's daughter, Radha Rutledge ([phone]), with appointment date & time. Will continue to follow.

## 2018-09-11 NOTE — PLAN OF CARE
Problem: Occupational Therapy Goal  Goal: Occupational Therapy Goal  Goals to be met by: 9/25   Patient will increase functional independence with ADLs by performing:    UE Dressing with Supervision while seated, adaptations for visual deficits as needed.  Grooming while seated with set up Assistance, adaptations for visual deficits as needed.  Toileting from bedside commode with Maximum Assistance for hygiene and clothing management.   Supine to sit with Minimal Assistance.  Squat pivot transfers with Maximum Assistance to BSC, maintaining weight bearing precautions.  Pt will verbalize and demo understanding of weight bearing precautions with min cueing.      Outcome: Ongoing (interventions implemented as appropriate)  OT re-evaluation performed. Goals amended to reflect current functional status. DALJIT Joshi 9/11/2018

## 2018-09-11 NOTE — ANESTHESIA PROCEDURE NOTES
Popliteal Sciatic Single Injection Block    Patient location during procedure: pre-op   Block not for primary anesthetic.  Reason for block: at surgeon's request and post-op pain management   Post-op Pain Location: Left ankle pain  Start time: 9/11/2018 8:49 AM  Timeout: 9/11/2018 8:48 AM   End time: 9/11/2018 8:56 AM  Staffing  Anesthesiologist: Mario Ann MD  Resident/CRNA: Zhane Kwong MD  Performed: resident/CRNA   Preanesthetic Checklist  Completed: patient identified, site marked, surgical consent, pre-op evaluation, timeout performed, IV checked, risks and benefits discussed and monitors and equipment checked  Peripheral Block  Patient position: supine  Prep: ChloraPrep  Patient monitoring: heart rate, cardiac monitor, continuous pulse ox, continuous capnometry and frequent blood pressure checks  Block type: popliteal  Laterality: left  Injection technique: single shot  Needle  Needle type: Stimuplex   Needle gauge: 21 G  Needle length: 4 in  Needle localization: anatomical landmarks and ultrasound guidance   -ultrasound image captured on disc.  Assessment  Injection assessment: negative aspiration, negative parasthesia and local visualized surrounding nerve  Paresthesia pain: none  Heart rate change: no  Slow fractionated injection: yes  Additional Notes  VSS.  [facility] RN monitoring vitals throughout procedure.  Patient tolerated procedure well.

## 2018-09-11 NOTE — PLAN OF CARE
Problem: Patient Care Overview  Goal: Plan of Care Review  Outcome: Ongoing (interventions implemented as appropriate)   09/11/18 1824   Coping/Psychosocial   Plan Of Care Reviewed With patient;family     Pt awake, alert and denies pain at this time.  Pt has family at bedside.  Patient is receiving oxygen at 0.5 L NC and doing well with supplemental oxygen.  Soft cast in place and elevated.  Pt has not regained sensation to left exposed toes and foot, when touched patient is not able to state felt sensations.  Pt is not able to wiggle toes when asked.  Pt is able to lift leg without problem.  Pt was assessed by PT/OT.  Pt plan if to discharge to skilled nursing for rehab.      Problem: Pressure Ulcer Risk (Parrish Scale) (Adult,Obstetrics,Pediatric)  Intervention: Prevent/Minimize Sheer/Friction Injuries   09/11/18 1349 09/11/18 1824   Skin Interventions   Pressure Reduction Devices --  Heel offloading device utilized   Pressure Reduction Techniques --  frequent weight shift encouraged;positioned off wounds;pressure points protected;weight shift assistance provided   Positioning   Positioning/Transfer Devices pillows;in use --      Intervention: Turn/Reposition Often   09/11/18 1349 09/11/18 1824   Skin Interventions   Pressure Reduction Techniques --  frequent weight shift encouraged;positioned off wounds;pressure points protected;weight shift assistance provided   Positioning   Body Position supine;supine, head elevated;foot of bed elevated;lower extremity elevated, left --          Problem: Infection, Risk/Actual (Adult)  Intervention: Prevent Infection/Maximize Resistance   09/11/18 1824   Hygiene Care   Bathing/Skin Care incontinence care;linen changed   Pain/Comfort/Sleep Interventions   Sleep/Rest Enhancement family presence promoted;noise level reduced

## 2018-09-11 NOTE — PT/OT/SLP PROGRESS
Speech Language Pathology      Mitzy Perez  MRN: [id number]    Patient not seen today secondary to surgery this AM. Will follow-up at scheduled treatment session.     Michell Joaquin M.S., CCC-SLP  Speech Language Pathologist  [phone]  09/11/2018

## 2018-09-11 NOTE — BRIEF OP NOTE
BRIEF OP NOTE    Preop Dx: Left trimalleolar ankle fracture    Possible left ankle syndesmosis disruption    Postop Dx: Left trimalleolar ankle fracture    Left ankle syndesmosis disruption    Procedure: 1.  Open treatment of right trimalleolar ankle fracture with internal fixation of lateral malleolus, without fixation of posterior lip - 35527    2.  Open treatment of left ankle syndesmosis injury with internal fixation - 26721    Surgeon: Dontae Negro M.D.    Asst:  Dagmar Wong M.D    Anesthesia: GLMA plus regional    EBL:  5cc    IVF:  1000cc crystalloid    Implants: Synthes fibular plate    Specimens: None    Findings: Stable fixation.  Small posterior and medial avulsion fragments.    Dispo:  To PACU extubated/stable     Dict#  [id number]

## 2018-09-11 NOTE — ASSESSMENT & PLAN NOTE
Mitzy Perez is a 85 y.o. female with left trimal ankle fracture dislocation, closed reduction 9/9/18  -PT/OT: ROXANNE LLE  - encouraged aggressive Ice and elevation LLE  - NPO overnight for OR this morning  - discussed with patient and family risks of surgery including nonunion, malunion, infection, hardware failure. All questions answered.

## 2018-09-11 NOTE — NURSING TRANSFER
Nursing Transfer Note      9/11/2018     Transfer To: 958A    Transfer via bed    Transported by PCT x2    Medicines sent: none    Chart send with patient: Yes    Notified: Radha (daughter via telephone)    Patient reassessed at: 9/11/2018 1300

## 2018-09-11 NOTE — NURSING
Pt returned to floor into room 958A.  Pt is awake, alert and oriented.  Denies pain at this time.  Pt did not have sensation to left exposed toes and was not able to move toes on left foot when asked.  Exposed toes to left foot and above cast to left leg is cool and nontender.  Notified Aida Gold that patient was returning to room.

## 2018-09-11 NOTE — PROGRESS NOTES
"[facility]  Orthopedics  Progress Note    Patient Name: Mitzy Perez  MRN: [id number]  Admission Date: 9/8/2018  Hospital Length of Stay: 1 days  Attending Provider: Karyna Vaughn MD  Primary Care Provider: Marta Michael MD  Follow-up For: Procedure(s) (LRB):  ORIF, ANKLE (Left)    Post-Operative Day: Day of Surgery  Subjective:     Principal Problem: L trimalleolar ankle fracture    Principal Orthopedic Problem: same    Interval History: Patient seen and examined at bedside.  No acute events overnight.  Pain controlled.Denies numbness/tingling of LLE. NPO overnight.       Review of patient's allergies indicates:  No Known Allergies    Current Facility-Administered Medications   Medication    acetaminophen tablet 650 mg    albuterol-ipratropium 2.5 mg-0.5 mg/3 mL nebulizer solution 3 mL    amLODIPine tablet 5 mg    atorvastatin tablet 40 mg    carvedilol tablet 6.25 mg    [START ON 9/11/2018] ceFAZolin injection 2 g    lisinopril tablet 20 mg    ondansetron disintegrating tablet 8 mg    ondansetron injection 4 mg    sodium chloride 0.9% flush 3 mL    traMADol tablet 50 mg     Objective:     Vital Signs (Most Recent):  Temp: 98.7 °F (37.1 °C) (09/10/18 1937)  Pulse: 65 (09/10/18 1937)  Resp: 18 (09/10/18 1937)  BP: (!) 173/74 (09/10/18 1937)  SpO2: (!) 93 % (09/10/18 1937) Vital Signs (24h Range):  Temp:  [98.1 °F (36.7 °C)-98.7 °F (37.1 °C)] 98.7 °F (37.1 °C)  Pulse:  [58-65] 65  Resp:  [16-20] 18  SpO2:  [91 %-95 %] 93 %  BP: (139-189)/(60-77) 173/74        Height: 4' 9" (144.8 cm)  Body mass index is 37.31 kg/m².      Intake/Output Summary (Last 24 hours) at 9/11/2018 0613  Last data filed at 9/11/2018 0348  Gross per 24 hour   Intake 100 ml   Output --   Net 100 ml       Ortho/SPM Exam     MSK:   Splint in place.  SILT DP/SP/BARNEY  Motor intact EHL/FHL  Brisk cap refill  Warm well perfused extremities  palpable    Significant Labs:   CBC:   Recent Labs   Lab  09/10/18   0314  " 09/11/18   0336   WBC  9.54  9.68   HGB  12.0  10.7*   HCT  35.4*  32.8*   PLT  194  218     CMP:   Recent Labs   Lab  09/10/18   0314  09/11/18   0336   NA  136  136   K  4.3  4.4   CL  106  108   CO2  22*  20*   GLU  104  109   BUN  45*  53*   CREATININE  1.5*  1.4   CALCIUM  9.0  8.9   PROT  7.1  7.0   ALBUMIN  2.5*  2.3*   BILITOT  0.6  0.4   ALKPHOS  118  110   AST  44*  43*   ALT  22  19   ANIONGAP  8  8   EGFRNONAA  31.5*  34.3*     All pertinent labs within the past 24 hours have been reviewed.    Significant Imaging: I have reviewed all pertinent imaging results/findings.    Assessment/Plan:     Trimalleolar fracture of left ankle    Mitzy Perez is a 85 y.o. female with left trimal ankle fracture dislocation, closed reduction 9/9/18  -PT/OT: NWB LLE  - encouraged aggressive Ice and elevation LLE  - NPO overnight for OR this morning  - discussed with patient and family risks of surgery including nonunion, malunion, infection, hardware failure. All questions answered.               Dagmar Wong MD  Orthopedics  [facility]    Attg Note:  I agree with the above assessment and plan.    Dontae Negro MD

## 2018-09-11 NOTE — PT/OT/SLP EVAL
Occupational Therapy   Evaluation    Name: Mitzy Perez  MRN: [id number]  Admitting Diagnosis:  Fall      Recommendations:     Discharge Recommendations: rehabilitation facility  Discharge Equipment Recommendations:     Barriers to discharge:  None    History:     Occupational Profile:  Living Environment: Pt lives with daughter in 1 story house with no steps to enter   Previous level of function: Daughter provided assistance as needed but reports pt able to dress and bathe herself without assistance   Equipment Used at Home:  walker, rolling, wheelchair  Assistance upon Discharge: Pt granddaughter to provide assistance     Past Medical History:   Diagnosis Date    Arthritis     Cataract     Essential hypertension 9/5/2018    Macular degeneration        Past Surgical History:   Procedure Laterality Date    CATARACT EXTRACTION  2003    LEFT EYE       Subjective     Chief Complaint: Pt with poor mobility and near blindness  Patient/Family Comments/goals: return to home     Pain/Comfort:  · Pain Rating 1: 0/10    Patients cultural, spiritual, Episcopal conflicts given the current situation: none stated    Objective:     Communicated with: RN prior to session.  Patient found all lines intact and telemetry upon OT entry to room.    General Precautions: Standard, fall, vision impaired   Orthopedic Precautions:LLE non weight bearing   Braces: N/A     Occupational Performance:    Bed Mobility:    · Pt with max A for bed mobility     Activities of Daily Living:  · Pt with mod A for upper extremity and Dependent with lower extremity self care     Cognitive/Visual Perceptual:  Cognitive/Psychosocial Skills:     -       Oriented to: Person, Place, Time and Situation   -       Follows Commands/attention:Follows two-step commands  -       Communication: clear/fluent  -       Memory: No Deficits noted  -       Safety awareness/insight to disability: intact   -       Mood/Affect/Coping skills/emotional control: Appropriate to  "situation    Physical Exam:  Upper Extremity Range of Motion:     -       Right Upper Extremity: WFL    -       Left Upper Extremity: WFL      Upper Extremity Strength:    -       Right Upper Extremity: WFL  -       Left Upper Extremity: WFL      AMPAC 6 Click ADL:  AMPAC Total Score: 13    Treatment & Education:  Evaluation complete and goals set.  Cont with POC  Pt educated on safety, role of OT, importance of increased participation in self care for gains , expectations for participation, expectations for gains, POC, energy conservation, caregiver strain. White board updated.     Education:    Patient left supine with all lines intact    Assessment:     Mitzy Perez is a 85 y.o. female with a medical diagnosis of Fall. Pt presents supine with Left leg wrapped, elevated and with ice. Pt with macular degeneration and with poor vision with only limited peripheral vision.  Despite poor vision pt was able to perform self care without assistance and with potential for return to prior level of function.   She presents with the following performance deficits affecting function: weakness, impaired endurance, impaired self care skills, impaired functional mobilty, gait instability.      Rehab Prognosis: Good; patient would benefit from acute skilled OT services to address these deficits and reach maximum level of function.         Clinical Decision Makin.  OT Low:  "Pt evaluation falls under low complexity for evaluation coding due to performance deficits noted in 1-3 areas as stated above and 0 co-morbities affecting current functional status. Data obtained from problem focused assessments. No modifications or assistance was required for completion of evaluation. Only brief occupational profile and history review completed."     Plan:     Patient to be seen 3 x/week to address the above listed problems via self-care/home management, therapeutic activities, therapeutic exercises  · Plan of Care Expires: " 10/10/18  · Plan of Care Reviewed with: patient, other (see comments)(grand daughter )    This Plan of care has been discussed with the patient who was involved in its development and understands and is in agreement with the identified goals and treatment plan    GOALS:   Multidisciplinary Problems     Occupational Therapy Goals        Problem: Occupational Therapy Goal    Goal Priority Disciplines Outcome Interventions   Occupational Therapy Goal     OT, PT/OT Ongoing (interventions implemented as appropriate)    Description:  Goals to be met by: 9/30     Patient will increase functional independence with ADLs by performing:    UE Dressing with Set-up Assistance.  LE Dressing with Set-up Assistance.  Grooming while seated with Set-up Assistance.                      Time Tracking:     OT Date of Treatment: 09/10/18  OT Start Time: 1012  OT Stop Time: 1025  OT Total Time (min): 13 min    Billable Minutes:Evaluation 13    Bebe Holloway, OT  9/10/2018

## 2018-09-11 NOTE — ANESTHESIA PROCEDURE NOTES
Saphenous Nerve Single Injection    Patient location during procedure: pre-op   Block not for primary anesthetic.  Reason for block: at surgeon's request and post-op pain management   Post-op Pain Location: Left ankle pain  Start time: 9/11/2018 8:49 AM  Timeout: 9/11/2018 8:48 AM   End time: 9/11/2018 8:56 AM  Staffing  Anesthesiologist: Mario Ann MD  Resident/CRNA: Zhane Kwong MD  Performed: resident/CRNA   Preanesthetic Checklist  Completed: patient identified, site marked, surgical consent, pre-op evaluation, timeout performed, IV checked, risks and benefits discussed and monitors and equipment checked  Peripheral Block  Patient position: supine  Prep: ChloraPrep  Patient monitoring: heart rate, cardiac monitor, continuous pulse ox, continuous capnometry and frequent blood pressure checks  Block type: saphenous  Laterality: left  Injection technique: single shot  Needle  Needle type: Stimuplex   Needle gauge: 21 G  Needle length: 4 in  Needle localization: anatomical landmarks and ultrasound guidance   -ultrasound image captured on disc.  Assessment  Injection assessment: negative aspiration, negative parasthesia and local visualized surrounding nerve  Paresthesia pain: none  Heart rate change: no  Slow fractionated injection: yes  Additional Notes  VSS.  [facility] RN monitoring vitals throughout procedure.  Patient tolerated procedure well.

## 2018-09-11 NOTE — SUBJECTIVE & OBJECTIVE
"Principal Problem: L trimalleolar ankle fracture    Principal Orthopedic Problem: same    Interval History: Patient seen and examined at bedside.  No acute events overnight.  Pain controlled.Denies numbness/tingling of LLE. NPO overnight.       Review of patient's allergies indicates:  No Known Allergies    Current Facility-Administered Medications   Medication    acetaminophen tablet 650 mg    albuterol-ipratropium 2.5 mg-0.5 mg/3 mL nebulizer solution 3 mL    amLODIPine tablet 5 mg    atorvastatin tablet 40 mg    carvedilol tablet 6.25 mg    [START ON 9/11/2018] ceFAZolin injection 2 g    lisinopril tablet 20 mg    ondansetron disintegrating tablet 8 mg    ondansetron injection 4 mg    sodium chloride 0.9% flush 3 mL    traMADol tablet 50 mg     Objective:     Vital Signs (Most Recent):  Temp: 98.7 °F (37.1 °C) (09/10/18 1937)  Pulse: 65 (09/10/18 1937)  Resp: 18 (09/10/18 1937)  BP: (!) 173/74 (09/10/18 1937)  SpO2: (!) 93 % (09/10/18 1937) Vital Signs (24h Range):  Temp:  [98.1 °F (36.7 °C)-98.7 °F (37.1 °C)] 98.7 °F (37.1 °C)  Pulse:  [58-65] 65  Resp:  [16-20] 18  SpO2:  [91 %-95 %] 93 %  BP: (139-189)/(60-77) 173/74        Height: 4' 9" (144.8 cm)  Body mass index is 37.31 kg/m².      Intake/Output Summary (Last 24 hours) at 9/11/2018 0613  Last data filed at 9/11/2018 0348  Gross per 24 hour   Intake 100 ml   Output --   Net 100 ml       Ortho/SPM Exam     MSK:   Splint in place.  SILT DP/SP/BARNEY  Motor intact EHL/FHL  Brisk cap refill  Warm well perfused extremities  palpable    Significant Labs:   CBC:   Recent Labs   Lab  09/10/18   0314  09/11/18   0336   WBC  9.54  9.68   HGB  12.0  10.7*   HCT  35.4*  32.8*   PLT  194  218     CMP:   Recent Labs   Lab  09/10/18   0314  09/11/18   0336   NA  136  136   K  4.3  4.4   CL  106  108   CO2  22*  20*   GLU  104  109   BUN  45*  53*   CREATININE  1.5*  1.4   CALCIUM  9.0  8.9   PROT  7.1  7.0   ALBUMIN  2.5*  2.3*   BILITOT  0.6  0.4   ALKPHOS  118  " 110   AST  44*  43*   ALT  22  19   ANIONGAP  8  8   EGFRNONAA  31.5*  34.3*     All pertinent labs within the past 24 hours have been reviewed.    Significant Imaging: I have reviewed all pertinent imaging results/findings.

## 2018-09-11 NOTE — NURSING
Spoke to Aida Gold regarding the [facility] and living will in pt's chart.  Stated this will be addressed when pt returns from surgery

## 2018-09-11 NOTE — PLAN OF CARE
SW spoke with admissions at [facility].  They said that the Dr wants the pt to transfer tomorrow.  SW will f/u tomorrow.    Fadumo Mayes, [facility] x [phone]

## 2018-09-11 NOTE — PHYSICIAN QUERY
PT Name: Mitzy Perez  MR #: [id number]     CDS/: Amina Bazzi               Contact information: [phone]  This form is a permanent document in the medical record.     Query Date: September 11, 2018    By submitting this query, we are merely seeking further clarification of documentation to reflect the severity of illness of your patient. Please utilize your independent clinical judgment when addressing the question(s) below.    The Medical record reflects the following:     Indicators   Supporting Clinical Findings Location in Medical Record   x AMS, Confusion,  LOC, etc.  presents this evening with her family because of some altered mental status, double vision and visual hallucinations that have worsened over the past day.     Cardiology 9/5 Consult (Judith)   x Acute / Chronic Illness diagnosis was Cerebrovascular accident (CVA), unspecified mechanism. Diagnoses of Confusion, Elevated troponin, Altered mental status, unspecified altered mental status type, and Hypertensive urgency were also pertinent to this visit.     Ed Note 9/5   x Radiology Findings Impression       Examination mildly degraded by patient motion artifact.    Known small multifocal areas of recent infarction are better delineated on recent MRI of 09/05/2018.    No new hemorrhage or major vascular distribution infarct.    Mild chronic microvascular ischemic change throughout the supratentorial white matter.      CT Head 9/5    Electrolyte Imbalance      Medication      Treatment              Other       Encephalopathy- is a general term for any diffuse disease of the brain that alters brain function or structure. Treatment of the cognitive dysfunction varies but is ultimately dependent on the treatment of the underlying condition.    Major Symptoms of Encephalopathy - Decreased level of consciousness, fluctuating alertness/concentration, confusion, agitation, lethargy, somnolence, drowsiness, obtundation, stupor, or coma.          References: National Institutes of Healths (NIH) National [place] of Neurological Disorders and Strokes;  HCPro 2016; Advisory Board     Clinical Guidelines:   These guidelines will set system standards to assist providers in managing, documentation, and coding of encephalopathy. The intent of this document is to serve as a system guideline, not replace the providers clinical judgment:  Provider, please specify the diagnosis or diagnoses associated with above clinical findings.    [   ] Hypertensive Encephalopathy - Signs and/or symptoms of cerebral edema caused by severe         and/or sudden rises in BP  [   ] Metabolic Encephalopathy - Due to electrolye imbalance, metabolic derangements, or infections processes, includes Septic Encephalopthy  [   ] Other Encephalopathy - Includes uremic encephalopathy  [   ] Unspecified Encephalopathy     [   ] Other Neurological Condition-  Includes Post-ictal altered mental status. (please specify condition): _________  [ x  ] Clinically Undetermined  Please document in your progress notes daily for the duration of treatment until resolved, and include in your discharge summary.

## 2018-09-11 NOTE — PT/OT/SLP PROGRESS
Physical Therapy      Patient Name:  Mitzy Perez   MRN:  [id number]    Patient not seen today secondary to Other (Comment)(Pt not seen this day d/t ankle surgery (ORIF) , Pt to be re-evaluated by PT.)    Ulices Soriano PTA

## 2018-09-11 NOTE — SUBJECTIVE & OBJECTIVE
Interval History:  no acute events overnight, no new complaints.  Pt returned from OR, no complications.  DNR updated with POA.    Review of Systems   Constitutional: Positive for fatigue. Negative for chills, diaphoresis and fever.   Eyes: Positive for visual disturbance (chronic; no changes). Negative for photophobia, pain and redness.   Respiratory: Negative for cough, chest tightness, shortness of breath and wheezing.    Cardiovascular: Negative for chest pain, palpitations and leg swelling.   Gastrointestinal: Negative for abdominal distention, abdominal pain, diarrhea, nausea and vomiting.   Musculoskeletal: Positive for myalgias. Negative for back pain and gait problem.        L ankle pain   Neurological: Negative for dizziness, seizures, syncope, speech difficulty, light-headedness, numbness and headaches.   Psychiatric/Behavioral: Negative for agitation, confusion, dysphoric mood and hallucinations. The patient is not nervous/anxious.      Objective:     Vital Signs (Most Recent):  Temp: 97.3 °F (36.3 °C) (09/11/18 1557)  Pulse: (!) 50 (09/11/18 1557)  Resp: 20 (09/11/18 1557)  BP: (!) 106/47 (09/11/18 1557)  SpO2: 95 % (09/11/18 1557) Vital Signs (24h Range):  Temp:  [97.3 °F (36.3 °C)-98.7 °F (37.1 °C)] 97.3 °F (36.3 °C)  Pulse:  [50-71] 50  Resp:  [15-23] 20  SpO2:  [92 %-100 %] 95 %  BP: (106-183)/(47-75) 106/47     Weight: 81 kg (178 lb 9.2 oz)  Body mass index is 38.64 kg/m².    Intake/Output Summary (Last 24 hours) at 9/11/2018 1613  Last data filed at 9/11/2018 1000  Gross per 24 hour   Intake 400 ml   Output --   Net 400 ml      Physical Exam   Constitutional: She is oriented to person, place, and time. She appears well-developed and well-nourished. No distress.   Cardiovascular: Normal rate, regular rhythm, normal heart sounds and intact distal pulses.   Pulmonary/Chest: Effort normal and breath sounds normal. No stridor. No respiratory distress.   Abdominal: Soft. Bowel sounds are normal. She  exhibits no distension. There is no tenderness.   Musculoskeletal:   LLE splinted; CDI; sensation intact; able to move toes   Neurological: She is alert and oriented to person, place, and time. No cranial nerve deficit.   Skin: Skin is warm and dry. She is not diaphoretic. No erythema.   Psychiatric: She has a normal mood and affect. Her behavior is normal.       Significant Labs: All pertinent labs within the past 24 hours have been reviewed.    Significant Imaging: I have reviewed all pertinent imaging results/findings within the past 24 hours.

## 2018-09-11 NOTE — PT/OT/SLP RE-EVAL
Physical Therapy Re-evaluation    Patient Name:  Mitzy Perez   MRN:  [id number]    Recommendations:     Discharge Recommendations:  rehabilitation facility   Discharge Equipment Recommendations: (TBD)   Barriers to discharge: Inaccessible home and Decreased caregiver support    Assessment:     Mitzy Perez is a 85 y.o. female admitted with a medical diagnosis of Fall.  She presents with the following impairments/functional limitations:  weakness, impaired endurance, impaired functional mobilty, gait instability, orthopedic precautions, impaired cardiopulmonary response to activity .Patient tolerated re evaluation  fairly well.  Attempt to stand x 3 trials with max x 2 A from EOB. Pt unable to come to complete stand without breaking NWB LLE precautions at this time. Pt has a hx of stroke and has chronic visual deficits with peripheral vision intact but decrease tracking B. pt stated she understood verbally her WB precautions but was still unable to maintain during attempts. Pt was able to scoot x 4 trials with max X 2 . Pt able to maintain WB precaution during scooting trial only.   Patient will continue to require skilled PT services to address the above impairments to return to prior level of function as independent as possible. Patient would benefit from an inpatient rehabilitation therapy stay to address the impairments listed above in a multidisciplinary, therapy intense setting. Further therapy is required in order to facilitate  patients return to prior level of function as independent as possible, decrease caregiver burden and reduce risk of falls.            Rehab Prognosis:  fair; patient would benefit from acute skilled PT services to address these deficits and reach maximum level of function.      Recent Surgery: Procedure(s) (LRB):  ORIF, ANKLE (Left)  FIXATION, SYNDESMOSIS, ANKLE (Left) Day of Surgery    Plan:     During this hospitalization, patient to be seen 4 x/week to address the above listed  problems via gait training, therapeutic activities, therapeutic exercises, neuromuscular re-education, wheelchair management/training  · Plan of Care Expires:  10/09/18   Plan of Care Reviewed with: patient    Subjective     Communicated with RN prior to session.  Patient found supine upon PT entry to room, agreeable to evaluation.      Chief Complaint: none; pt very pleasant  Patient comments/goals: to get stronger and go to rehab.  Pain/Comfort:  · Pain Rating 1: 0/10  · Pain Rating Post-Intervention 1: 0/10    Patients cultural, spiritual, Hinduism conflicts given the current situation: none stated      Objective:     Patient found with: (telemetry)     General Precautions: Standard, fall(vision impaired)   Orthopedic Precautions:(LLE NWB)   Braces: N/A       Exams:  · Cognitive Exam:  Patient is oriented to Person, Place and Situation  · Fine Motor Coordination: -       Intact  · Gross Motor Coordination:  WFL  · RLE ROM: WFL  · RLE Strength: WFL  · LLE ROM: WFL except knee/ankle not assessed  · LLE Strength: WFL except knee/ankle not assessed     Functional Mobility:  · Bed Mobility:  Rolling Left:  minimum assistance  · Rolling Right: minimum assistance  · Scooting: max x 2 laterally to R with PT blocking R knee and LLE placed on   · Supine to Sit: maximal assistance   · Sit to Supine: maximal assistance  · Transfers:  Sit to Stand:  maximal assistance and of 2  persons with rolling walker and unalbe to come to complete stand without putting weight through LLE. PT with foot under LLE and pt unable to maintain WB precuations at this time during  All attempts  · Balance: static sitting fair + ; pt with intailt post lean; dynamic balance in sitting: fair, pt with post lean durin RLE MMT assessmetn but no LOB   ? Standing balance: unable to assess 2/2 pt not following precautions         AM-PAC 6 CLICK MOBILITY  Total Score:11       Therapeutic Activities and Exercises:  ·  Whiteboard updated in patients room  to current assistance level  · All of patients questions were answered within the scope of PT    Patient education  · Patient educated on the role of PT and POC  · Patient educated on importance  activity while in the hosptial per tolerance for improved endurance and to limit deconditioning   · Patient educated on safe transfers with nursing as appropriate  · Patient educated on energy conservation, pursed lip breathing  · Patient educated on proper transfer mechanics and safety    Patient left HOB elevated with all lines intact, call button in reach and family present.    GOALS:   Multidisciplinary Problems     Physical Therapy Goals        Problem: Physical Therapy Goal    Goal Priority Disciplines Outcome Goal Variances Interventions   Physical Therapy Goal     PT, PT/OT Ongoing (interventions implemented as appropriate)     Description:  Goals to be met by: 9/10/18     Patient will increase functional independence with mobility by performin. Supine to sit with Contact Guard Assistance  2. Sit to supine with Contact Guard Assistance  3. Sit to stand transfer with Moderate Assistance  4. Bed to chair transfer with Maximum Assistance using Rolling Walker or most appropriate device  5. Lower extremity exercise program x 20  reps per handout, with independence                      History:     Past Medical History:   Diagnosis Date    Arthritis     Cataract     Essential hypertension 2018    Macular degeneration        Past Surgical History:   Procedure Laterality Date    CATARACT EXTRACTION      LEFT EYE       Clinical Decision Making:     History  Co-morbidities and personal factors that may impact the plan of care Examination  Body Structures and Functions, activity limitations and participation restrictions that may impact the plan of care Clinical Presentation   Decision Making/ Complexity Score   Co-morbidities:   [] Time since onset of injury / illness / exacerbation  [] Status of current  condition  []Patient's cognitive status and safety concerns    [x] Multiple Medical Problems (see med hx)  Personal Factors:   [x] Patient's age  [] Prior Level of function   [] Patient's home situation (environment and family support)  [] Patient's level of motivation  [x] Expected progression of patient      HISTORY:(criteria)    [] 66160 - no personal factors/history    [x] 77248 - has 1-2 personal factor/comorbidity     [] 75642 - has >3 personal factor/comorbidity     Body Regions:  [] Objective examination findings  [] Head     []  Neck  [] Trunk   [] Upper Extremity  [] Lower Extremity    Body Systems:  [] For communication ability, affect, cognition, language, and learning style: the assessment of the ability to make needs known, consciousness, orientation (person, place, and time), expected emotional /behavioral responses, and learning preferences (eg, learning barriers, education  needs)  [] For the neuromuscular system: a general assessment of gross coordinated movement (eg, balance, gait, locomotion, transfers, and transitions) and motor function  (motor control and motor learning)  [x] For the musculoskeletal system: the assessment of gross symmetry, gross range of motion, gross strength, height, and weight  [] For the integumentary system: the assessment of pliability(texture), presence of scar formation, skin color, and skin integrity  [] For cardiovascular/pulmonary system: the assessment of heart rate, respiratory rate, blood pressure, and edema     Activity limitations:    [] Patient's cognitive status and saf ety concerns          [] Status of current condition      [] Weight bearing restriction  [] Cardiopulmunary Restriction    Participation Restrictions:   [] Goals and goal agreement with the patient     [] Rehab potential (prognosis) and probable outcome      Examination of Body System: (criteria)    [] 14017 - addressing 1-2 elements    [x] 89332 - addressing a total of 3 or more elements      [] 47027 -  Addressing a total of 4 or more elements         Clinical Presentation: (criteria)  Stable - 91297     On examination of body system using standardized tests and measures patient presents with 1-2 elements from any of the following: body structures and functions, activity limitations, and/or participation restrictions.  Leading to a clinical presentation that is considered evolving with changing characteristics                              Clinical Decision Making  (Eval Complexity):  Moderate - 33616     Time Tracking:     PT Received On: 09/11/18  PT Start Time: 1451     PT Stop Time: 1512  PT Total Time (min): 21 min     Billable Minutes: Re-eval x10 min and Therapeutic Activity x 10 min      Michael Kumar, PT  09/11/2018

## 2018-09-11 NOTE — TRANSFER OF CARE
"Anesthesia Transfer of Care Note    Patient: Mitzy Perez    Procedure(s) Performed: Procedure(s) (LRB):  ORIF, ANKLE (Left)  FIXATION, SYNDESMOSIS, ANKLE (Left)    Patient location: PACU    Anesthesia Type: general    Transport from OR: Transported from OR on 6-10 L/min O2 by face mask with adequate spontaneous ventilation    Post pain: adequate analgesia    Post assessment: no apparent anesthetic complications and tolerated procedure well    Post vital signs: stable    Nausea/Vomiting: no nausea/vomiting    Complications: none    Transfer of care protocol was followed      Last vitals:   Visit Vitals  BP (!) 132/59 (BP Location: Right arm, Patient Position: Lying)   Pulse 71   Temp 36.4 °C (97.5 °F) (Axillary)   Resp 16   Ht 4' 9" (1.448 m)   Wt 81 kg (178 lb 9.2 oz)   SpO2 100%   Breastfeeding? No   BMI 38.64 kg/m²     "

## 2018-09-11 NOTE — PLAN OF CARE
CM informed Sindy ([id number])w/physical therapy that the patient has returned from surgery & needs a PT/OT evaluation prior to admission to in-pt rehab. Will continue to follow.

## 2018-09-11 NOTE — ANESTHESIA POSTPROCEDURE EVALUATION
"Anesthesia Post Evaluation    Patient: Mitzy Perez    Procedure(s) Performed: Procedure(s) (LRB):  ORIF, ANKLE (Left)  FIXATION, SYNDESMOSIS, ANKLE (Left)    Final Anesthesia Type: general  Patient location during evaluation: PACU  Patient participation: Yes- Able to Participate  Level of consciousness: awake and alert and oriented  Post-procedure vital signs: reviewed and stable  Pain management: adequate  Airway patency: patent  PONV status at discharge: No PONV  Anesthetic complications: no      Cardiovascular status: blood pressure returned to baseline and hemodynamically stable  Respiratory status: unassisted, room air and spontaneous ventilation  Hydration status: euvolemic  Follow-up not needed.        Visit Vitals  BP (!) 140/55 (BP Location: Right arm, Patient Position: Lying)   Pulse 63   Temp 36.4 °C (97.5 °F) (Axillary)   Resp 15   Ht 4' 9" (1.448 m)   Wt 81 kg (178 lb 9.2 oz)   SpO2 98%   Breastfeeding? No   BMI 38.64 kg/m²       Pain/Belinda Score: Pain Assessment Performed: Yes (9/11/2018 11:00 AM)  Presence of Pain: non-verbal indicators absent (9/11/2018 11:00 AM)  Pain Rating Prior to Med Admin: 5 (9/11/2018 11:21 AM)  Pain Rating Post Med Admin: 0 (9/10/2018  8:03 AM)  Belinda Score: 7 (9/11/2018 11:00 AM)        "

## 2018-09-11 NOTE — OP NOTE
DATE OF PROCEDURE:  09/11/2018    PREOPERATIVE DIAGNOSES:  1.  Left trimalleolar ankle fracture.  2.  Possible left ankle syndesmosis disruption.    POSTOPERATIVE DIAGNOSES:  1.  Left trimalleolar ankle fracture.  2.  Left ankle syndesmosis disruption.    PROCEDURES PERFORMED:  1.  Open treatment of right trimalleolar ankle fracture with internal fixation   of lateral malleolus without fixation of posterior lip, 43692.  2.  Open treatment of left ankle syndesmosis injury with internal fixation,   74864.    SURGEON:  Dontae Negro M.D.    ASSISTANT:  Dagmar Wong.    ANESTHESIA:  General laryngeal mask plus regional.    ESTIMATED BLOOD LOSS:  5 mL.    IV FLUIDS:  1000 mL crystalloid.    IMPLANTS:  Synthes fibular plate.    INDICATIONS FOR PROCEDURE:  The patient is an 85-year-old female who fell from a   standing height twisting injury resulting in a left ankle fracture dislocation   with trimalleolar fracture with a very small avulsion off the tip of the medial   malleolus, a comminuted lateral malleolus fractures and a small posterior   lateral malleolar fracture.  The patient was treated initially with closed   reduction splinting.  Her soft tissues are now amenable to open reduction and   internal fixation.  The risks, benefits, and alternatives of surgery were   discussed with the patient and the patient's family prior to going to the   Operating Room.  Informed consent was obtained.    PROCEDURE IN DETAIL:  The patient was identified in the preoperative holding   area and the site was marked.  Regional analgesia was performed.  The patient   was wheeled into the Operating Room and placed on the operating table in supine   position.  General laryngeal mask anesthesia was induced and preoperative   antibiotics were administered.  Left lower extremity was then placed in a   nonsterile tourniquet and prepped and draped in sterile fashion.  Timeout was   undertaken to confirm patient, site, side, surgery, surgeon  and administration   of preoperative approximately.  All agreed and we proceeded.    The leg was examined and the tourniquet was raised.  A straight lateral incision   was made and carried down to the level of the fibular fracture.  The patient   had comminution.  I was able to pull this out with a clamp and clamped it to get   overall length and alignment.  I then placed a Synthes locking fibular plate   and affixed it with a cortical screw distally to pull the plate to bone and then   placed two cortical screws into the shaft proximally through the plate.  At   this point, I had good length with the comminution of the fracture.  I then   placed two syndesmosis screws through the plate to squeeze the fracture in   better position and to stabilize the syndesmosis, which was attached to all the   comminution.  I placed the locking screws distally and removed the cortical   screw and replaced this with a locking screw.  At this point, I had the   syndesmosis close down well.  There was still some lateral talar tilt possible,   but that was only from a small avulsion on the medial side, which did not want   fixation.  The posterior malleolar fracture fragments were in good position.    The tourniquet was let down and hemostasis was obtained with electrocautery.    Wound was closed with 0 Vicryl sutures in deep tissue, 3-0 Vicryl inverted   sutures in subcutaneous tissue and 3-0 nylon suture in the skin.    All instrument and sponge counts were reported correct at the end of the case.    There were no complications.  A sterile dressing was applied followed by   posterior short-leg splint with stirrups, and the patient was awakened and taken   to the Recovery Room in stable condition.    PLAN FOR THE PATIENT:  Will be sutures out in two weeks and she will go into a   cast until 6 and then begin range of motion, weightbearing at 8 to 10 weeks   postop.      PHILLIP  dd: 09/11/2018 11:05:37 (CDZOE)  td: 09/11/2018 12:24:46  (CDT)  Doc ID   #[id number]  Job ID #[id number]    CC:

## 2018-09-11 NOTE — ASSESSMENT & PLAN NOTE
- Elevated on arrival in setting of pain and missed AM meds  - Resume home regimen: Norvasc 5 mg daily, coreg 6.25 mg BID, lisinopril 20 mg daily  - 9/11 controlled, Continue to monitor

## 2018-09-11 NOTE — PLAN OF CARE
SW spoke with the PA and the plan is for therapy to see the Pt and OT to see the pt after surgery and possibly transfer to [facility] today.  MALIKA spoke with Joey at [facility].  They will review the notes after they are written, speak with their dr, and call the SW back.  SW will f/u in a few hours.    Fadumo Mayes, JEEVAN x [phone]

## 2018-09-11 NOTE — PLAN OF CARE
Problem: Occupational Therapy Goal  Goal: Occupational Therapy Goal  Goals to be met by: 9/30     Patient will increase functional independence with ADLs by performing:    UE Dressing with Set-up Assistance.  LE Dressing with Set-up Assistance.  Grooming while seated with Set-up Assistance.    Outcome: Ongoing (interventions implemented as appropriate)  Evaluation complete and goals set.  Cont with POC  Bebe Holloway OT  9/10/2018

## 2018-09-11 NOTE — PROGRESS NOTES
[facility] Hospital Medicine  Progress Note    Patient Name: Mitzy Perez  MRN: [id number]  Patient Class: IP- Inpatient   Admission Date: 9/8/2018  Length of Stay: 1 days  Attending Physician: Karyna Vaughn MD  Primary Care Provider: Marta Michael MD    [facility] Medicine Team: [facility] HOSP MED F Aida Gold PA-C    Subjective:     Principal Problem:Fall    HPI:  85 year old female with a PMHx of HTN, macular degeneration, and recent CVA (admitted 9/5-9/7) presenting with L ankle pain s/p mechanical fall at home. Pt was just discharged from [facility] yesterday after admission for CVA. During that admission, recommendations were for patient to go to inpatient rehab; family opted for patient to return home with [facility] and - care. Pt arrived home ~7pm when she went to the bathroom. Per granddaughter, pt did not call for assistance. While in the bathroom, patient slipped while reaching to put toilet seat down. Pt denies prodromal symptoms, including chest pain, SOB, lightheadedness, vision/speech changes. She denies LOC and head injury. Family reports they heard her fall and came to her aid immediately. EMS called to transport her to hospital. At the time of my exam, pt only endorses minimal pain to LLE. Denies chest pain, SOB, abdominal pain, N/V/D, fever/chills, vision/speech changes.       In the ED, HDS. Imaging revealed displaced fracture of the lateral malleolus with anterior and medial dislocation of the tibia. Ortho surgery reduced and splinted in the ER; rec NWB LLE; plan for outpatient surgery. Patient admitted for rehab placement.    Hospital Course:  Pt admitted for rehab placement after mechanical fall at home resulting in trimalleolar fracture of L ankle. Ortho surg rec NWB LLE, ice/elevation. PT/OT/SLP consulted. 9/11 Open treatment of right trimalleolar ankle fracture with internal fixation of lateral malleolus without fixation of posterior lip, 30655.  Open treatment of left  ankle syndesmosis injury with internal fixation.  Discharge to [facility].     Interval History:  no acute events overnight, no new complaints.  Pt returned from OR, no complications.  DNR updated with POA.    Review of Systems   Constitutional: Positive for fatigue. Negative for chills, diaphoresis and fever.   Eyes: Positive for visual disturbance (chronic; no changes). Negative for photophobia, pain and redness.   Respiratory: Negative for cough, chest tightness, shortness of breath and wheezing.    Cardiovascular: Negative for chest pain, palpitations and leg swelling.   Gastrointestinal: Negative for abdominal distention, abdominal pain, diarrhea, nausea and vomiting.   Musculoskeletal: Positive for myalgias. Negative for back pain and gait problem.        L ankle pain   Neurological: Negative for dizziness, seizures, syncope, speech difficulty, light-headedness, numbness and headaches.   Psychiatric/Behavioral: Negative for agitation, confusion, dysphoric mood and hallucinations. The patient is not nervous/anxious.      Objective:     Vital Signs (Most Recent):  Temp: 97.3 °F (36.3 °C) (09/11/18 1557)  Pulse: (!) 50 (09/11/18 1557)  Resp: 20 (09/11/18 1557)  BP: (!) 106/47 (09/11/18 1557)  SpO2: 95 % (09/11/18 1557) Vital Signs (24h Range):  Temp:  [97.3 °F (36.3 °C)-98.7 °F (37.1 °C)] 97.3 °F (36.3 °C)  Pulse:  [50-71] 50  Resp:  [15-23] 20  SpO2:  [92 %-100 %] 95 %  BP: (106-183)/(47-75) 106/47     Weight: 81 kg (178 lb 9.2 oz)  Body mass index is 38.64 kg/m².    Intake/Output Summary (Last 24 hours) at 9/11/2018 1613  Last data filed at 9/11/2018 1000  Gross per 24 hour   Intake 400 ml   Output --   Net 400 ml      Physical Exam   Constitutional: She is oriented to person, place, and time. She appears well-developed and well-nourished. No distress.   Cardiovascular: Normal rate, regular rhythm, normal heart sounds and intact distal pulses.   Pulmonary/Chest: Effort normal and breath sounds normal. No  stridor. No respiratory distress.   Abdominal: Soft. Bowel sounds are normal. She exhibits no distension. There is no tenderness.   Musculoskeletal:   LLE splinted; CDI; sensation intact; able to move toes   Neurological: She is alert and oriented to person, place, and time. No cranial nerve deficit.   Skin: Skin is warm and dry. She is not diaphoretic. No erythema.   Psychiatric: She has a normal mood and affect. Her behavior is normal.       Significant Labs: All pertinent labs within the past 24 hours have been reviewed.    Significant Imaging: I have reviewed all pertinent imaging results/findings within the past 24 hours.    Assessment/Plan:      * Fall    Closed displaced fracture of L lateral malleolus  S/p mechanical fall at home  - Discharged from [facility] 9/7 after admission for CVA (9/5-9/7). During that admission, recommendations were for patient to go to inpatient rehab; family opted for patient to return home with [facility] and -[facility]  - Mechanical fall while attempting to go to bathroom without assistance after arriving home  - Imaging revealed displaced fracture of L lateral malleolus with anterior and medial dislocation of the tibia  - Ortho surgery reduced and splinted in the ER; rec NWB LLE and initial plan for outpatient surgery  - Cautious use of opioids in elderly, analgesics PRN  - Family in agreement with patient going to Rehab for continued therapy. Family would like [facility]  - PT/OT/SLP consulted  - Per ortho surg, plan for OR 9/11. NPO at [place]. Held lovenox.  9/11 Open treatment of right trimalleolar ankle fracture with internal fixation   of lateral malleolus without fixation of posterior lip, Open treatment of left ankle syndesmosis injury with internal fixation  - Anticipate discharge to [facility] 9/12 per rehab physician    Pt is intermediate risk for a low risk surgery.  Would recommend continuing ASA and Coreg through surgery. Would also recommend regional anesthesia > general anesthesia  if possible.         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

## 2018-09-11 NOTE — PLAN OF CARE
Problem: Patient Care Overview  Goal: Plan of Care Review  Outcome: Ongoing (interventions implemented as appropriate)  POC reviewed,no falls,skin intact,NPO maintained since MN.

## 2018-09-11 NOTE — PT/OT/SLP RE-EVAL
Occupational Therapy   Re-evaluation    Name: Mitzy Perez  MRN: [id number]  Admitting Diagnosis:  Fall Day of Surgery    Recommendations:     Discharge Recommendations: rehabilitation facility  Discharge Equipment Recommendations:  (TBD at next level of care)  Barriers to discharge:  None    History:     Past Medical History:   Diagnosis Date    Arthritis     Cataract     Essential hypertension 9/5/2018    Macular degeneration        Past Surgical History:   Procedure Laterality Date    CATARACT EXTRACTION  2003    LEFT EYE       Subjective     Chief Complaint: none stated   Patient/Family stated goals: to go to rehab  Communicated with: RN prior to session. Re-evaluation completed with PT.   Pain/Comfort:  · Pain Rating 1: 0/10    Objective:     Patient found with: telemetry    General Precautions: Standard, fall, vision impaired, aspiration(DNR; Delirium)   Orthopedic Precautions:(NWB LLE)   Braces: N/A     Occupational Performance:    Bed Mobility:    · Patient completed Rolling/Turning to Right with maximal assistance  · Patient completed Scooting/Bridging with maximal assistance and in forward plane to EOB   · Max A x2 persons to scoot laterally to R at EOB x3 trials   · Max A x2 persons for drawsheet t/f to HOB while supine  · Patient completed Supine to Sit with maximal assistance  · Patient completed Sit to Supine with maximal assistance    Functional Mobility/Transfers:  · Patient completed Sit <> Stand Transfer with maximal assistance and of 2 persons  with  rolling walker x3 trials from EOB   · Pt required maximal cueing for NWB status of LLE and unable to demo learning--unable to stand fully upright all standing trials due to pt unable to maintain weight bearing precautions    Activities of Daily Living:  · Lower Body Dressing: total assistance to don R sock    Cognitive/Visual Perceptual:  Cognitive/Psychosocial Skills:     -       Oriented to: Person, Place, Time and Situation   -       Follows  Commands/attention:Follows two-step commands  -       Communication: clear/fluent  -       Memory: No Deficits noted  -       Safety awareness/insight to disability: intact   -       Mood/Affect/Coping skills/emotional control: Appropriate to situation  Visual/Perceptual:      -Impaired  tracking and visual field--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 Able to see large objects and people, but no details. Slight peripheral vision to L and R, more on L as compared to R    Physical Exam:  Postural examination/scapula alignment:    -       Rounded shoulders  -       Posterior pelvic tilt  Skin integrity: Visible skin intact  Edema:  None noted  Sensation:    -       Intact BUE  Motor Planning:    -       intact   Dominant hand:    -       R  Upper Extremity Range of Motion:     -       Right Upper Extremity: WFL   -       Left Upper Extremity: WFL    Upper Extremity Strength:    -       Right Upper Extremity: WFL   -       Left Upper Extremity: WFL     Strength:   -       Right Upper Extremity: WFL   -       Left Upper Extremity: WFL    Fine Motor Coordination:    -       Intact    Patient left with bed in chair position with all lines intact, call button in reach and LLE elevated and family  present    [facility] 6 Click:  [facility] Total Score: 13    Treatment & Education:  -Edu for OT role and POC  -Edu for role and frequency of therapy in acute vs rehab  -Edu for importance of OOB activity and impact on healing  -Edu for weight bearing precautions--requires reinforcement  -Whiteboard updated   -Questions and concerns addressed within OT scope of practice   Education:    Assessment:     Mitzy Perez is a 85 y.o. female with a medical diagnosis of Fall.  She presents with decreased functional independence in various areas of her life. She demo good motivation, willingness to participate, and family support. She demo significant decrease in ADL skills as compared to baseline. She demo need for significant assist all standing  trials this date; however, pt unable to maintain weight bearing precautions. This should continue to be reinforced. Pt demo decreased endurance to functional task. Pt would continue to benefit from skilled OT services for maximum functional outcome and safety. Performance deficits affecting function are weakness, impaired endurance, impaired self care skills, impaired functional mobilty, gait instability, impaired balance, decreased coordination, decreased lower extremity function, decreased safety awareness, impaired coordination, impaired cardiopulmonary response to activity, orthopedic precautions.      Rehab Prognosis:  good; patient would benefit from acute skilled OT services to address these deficits and reach maximum level of function.       Plan:     Patient to be seen 3 x/week to address the above listed problems via self-care/home management, therapeutic activities, therapeutic exercises  · Plan of Care Expires: 10/11/18  · Plan of Care Reviewed with: patient    This Plan of care has been discussed with the patient who was involved in its development and understands and is in agreement with the identified goals and treatment plan    GOALS:   Multidisciplinary Problems     Occupational Therapy Goals        Problem: Occupational Therapy Goal    Goal Priority Disciplines Outcome Interventions   Occupational Therapy Goal     OT, PT/OT Ongoing (interventions implemented as appropriate)    Description:  Goals to be met by: 9/25   Patient will increase functional independence with ADLs by performing:    UE Dressing with Supervision while seated, adaptations for visual deficits as needed.  Grooming while seated with set up Assistance, adaptations for visual deficits as needed.  Toileting from bedside commode with Maximum Assistance for hygiene and clothing management.   Supine to sit with Minimal Assistance.  Squat pivot transfers with Maximum Assistance to BSC, maintaining weight bearing precautions.  Pt will  verbalize and demo understanding of weight bearing precautions with min cueing.                        Time Tracking:     OT Date of Treatment: 09/11/18  OT Start Time: 0251  OT Stop Time: 0309  OT Total Time (min): 18 min    Billable Minutes:Mani 8  Therapeutic Activity 10    DALJIT Joshi  9/11/2018

## 2018-09-12 NOTE — PROGRESS NOTES
[facility]  Physical Medicine & Rehab  Progress Note    Patient Name: Mitzy Perez  MRN: [id number]  Admission Date: 9/8/2018  Length of Stay: 2 days  Attending Physician: Karyna Vaughn MD    Subjective:     Principal Problem: Fall    Hospital Course:   9/9/18: SLP recommendation: regular diet and thin liquids. SLP diagnosis of Dysphagia  9/11/18: Participated with therapy. Bed mobility Keith- maxA. Sit to stand maxA x 2ppl w/ RW. LBD totalA.     Interval History 9/12/2018:  Patient is seen for follow-up rehab evaluation and recommendations: No acute events over night. Participating with therapy.      HPI, Past Medical, Family, and Social History remains the same as documented in the initial encounter.    Scheduled Medications:    albuterol-ipratropium  3 mL Nebulization Once    amLODIPine  5 mg Oral Daily    atorvastatin  40 mg Oral Daily    carvedilol  6.25 mg Oral BID    enoxaparin  30 mg Subcutaneous Daily    lisinopril  20 mg Oral Daily    polyethylene glycol  17 g Oral Once    sodium chloride 0.9%  3 mL Intravenous Q8H     Diagnostic Results:   Labs: Reviewed  ECG: Reviewed  CT: Reviewed    PRN Medications: acetaminophen, albuterol-ipratropium, HYDROcodone-acetaminophen, HYDROcodone-acetaminophen, ondansetron, ondansetron, traMADol    Review of Systems   Constitutional: Negative for fatigue and fever.   HENT: Negative for sore throat and trouble swallowing.    Eyes: Positive for visual disturbance.   Respiratory: Negative for cough and shortness of breath.    Cardiovascular: Negative for chest pain and leg swelling.   Gastrointestinal: Negative for abdominal distention and abdominal pain.   Genitourinary: Negative for difficulty urinating.   Musculoskeletal: Positive for gait problem. Negative for back pain.   Skin: Negative for color change.   Neurological: Negative for dizziness and headaches.   Psychiatric/Behavioral: Positive for confusion. Negative for agitation.      Objective:     Vital Signs (Most Recent):  Temp: 97.1 °F (36.2 °C) (09/12/18 1141)  Pulse: (!) 48 (09/12/18 1141)  Resp: 16 (09/12/18 1141)  BP: (!) 113/53 (09/12/18 1141)  SpO2: (!) 94 % (09/12/18 1141)    Vital Signs (24h Range):  Temp:  [97 °F (36.1 °C)-97.8 °F (36.6 °C)] 97.1 °F (36.2 °C)  Pulse:  [48-65] 48  Resp:  [14-20] 16  SpO2:  [92 %-99 %] 94 %  BP: (106-154)/(47-69) 113/53     Physical Exam   Constitutional: She appears well-developed and well-nourished.   Eyes: EOM are normal. Pupils are equal, round, and reactive to light.   Neck: Normal range of motion. Neck supple.   Cardiovascular: Normal rate and regular rhythm.   Pulmonary/Chest: Effort normal and breath sounds normal.   Abdominal: Soft. Normal appearance and bowel sounds are normal. She exhibits no abdominal bruit. There is no rigidity.   Musculoskeletal: Normal range of motion.   Neurological: She is alert.   -  Mental Status: AAOx3. Follows commands.   -  Speech and language: dysarthria present.    -  Vision: difficult with macular degeneration    -  Musculoskeletal: LLE cast intact, wiggling toes   -  Facial movement (CN VII): symmetrical  -  Sensory:  Intact to light touch    Skin: Skin is warm and dry.   Psychiatric: She has a normal mood and affect.     Assessment/Plan:      * Fall    - left trimal ankle fracture with dislocation    Recommendations  -  Encourage mobility, OOB in chair at least 3 hours per day, and early ambulation as appropriate  -  PT/OT evaluate and treat  -  Pain management  -  Monitor for and prevent skin breakdown and pressure ulcers  · Early mobility, repositioning/weight shifting every 20-30 minutes when sitting, turn patient every 2 hours, proper mattress/overlay and chair cushioning, pressure relief/heel protector boots  -  DVT prophylaxis    -  Reviewed discharge options (IP rehab, SNF, HH therapy, and OP therapy)      Trimalleolar fracture of left ankle    - X-ray revealed left trimal ankle fracture with  dislocation.   - s/p 9/11 ORIF, ankle. 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     See hospital course for functional, cognitive/speech/language, and nutrition/swallow status.      Recommendations  -  Encourage mobility, OOB in chair at least 3 hours per day, and early ambulation as appropriate   -  PT/OT evaluate and treat  -  SLP speech and cognitive evaluate and treat  -  Monitor sleep disturbances and establish consistent sleep-wake cycle  -  Monitor for bowel and bladder dysfunction  -  Monitor for shoulder pain and subluxation  -  Monitor for spasticity  -  Monitor for and prevent skin breakdown and pressure ulcers  · Early mobility, repositioning/weight shifting every 20-30 minutes when sitting, turn patient every 2 hours, proper mattress/overlay and chair cushioning, pressure relief/heel protector boots  -  DVT prophylaxis  -  Reviewed discharge options (IP rehab, SNF, HH therapy, and OP therapy)        Recommend Inpatient Rehab.  IRF preference per patient/Right [facility].      Alma Meza NP  Department of Physical Medicine & Rehab   [facility]

## 2018-09-12 NOTE — PLAN OF CARE
MALIKA spoke with stephanie at [facility] and the pt can transfer now.  SW called spd and they will come in 1 - 2 hours.  Nurse will update the pt.    Fadumo Mayes, JEEVAN x [phone]

## 2018-09-12 NOTE — ASSESSMENT & PLAN NOTE
Closed displaced fracture of L lateral malleolus  S/p mechanical fall at home  - Discharged from [facility] 9/7 after admission for CVA (9/5-9/7). During that admission, recommendations were for patient to go to inpatient rehab; family opted for patient to return [facility] with [facility]  - Mechanical fall while attempting to go to bathroom without assistance after arriving home  - Imaging revealed displaced fracture of L lateral malleolus with anterior and medial dislocation of the tibia  - Ortho surgery reduced and splinted in the ER; rec NWB LLE and initial plan for outpatient surgery  - Cautious use of opioids in elderly, analgesics PRN  - Family in agreement with patient going to Rehab for continued therapy. Family would like [facility]  - PT/OT/SLP consulted  - Per ortho surg, plan for OR 9/11. NPO at [facility]. Held lovenox.  9/11 Open treatment of right trimalleolar ankle fracture with internal fixation   of lateral malleolus without fixation of posterior lip, Open treatment of left ankle syndesmosis injury with internal fixation  - Discharge to [facility] 9/12 per rehab physician  - Ortho recs: sutures out in two weeks and she will go into a cast until 6 and then begin range of motion, weightbearing at 8 to 10 weeks postop.

## 2018-09-12 NOTE — SUBJECTIVE & OBJECTIVE
Interval History 9/12/2018:  Patient is seen for follow-up rehab evaluation and recommendations: No acute events over night. Participating with therapy.      HPI, Past Medical, Family, and Social History remains the same as documented in the initial encounter.    Scheduled Medications:    albuterol-ipratropium  3 mL Nebulization Once    amLODIPine  5 mg Oral Daily    atorvastatin  40 mg Oral Daily    carvedilol  6.25 mg Oral BID    enoxaparin  30 mg Subcutaneous Daily    lisinopril  20 mg Oral Daily    polyethylene glycol  17 g Oral Once    sodium chloride 0.9%  3 mL Intravenous Q8H     Diagnostic Results:   Labs: Reviewed  ECG: Reviewed  CT: Reviewed    PRN Medications: acetaminophen, albuterol-ipratropium, HYDROcodone-acetaminophen, HYDROcodone-acetaminophen, ondansetron, ondansetron, traMADol    Review of Systems   Constitutional: Negative for fatigue and fever.   HENT: Negative for sore throat and trouble swallowing.    Eyes: Positive for visual disturbance.   Respiratory: Negative for cough and shortness of breath.    Cardiovascular: Negative for chest pain and leg swelling.   Gastrointestinal: Negative for abdominal distention and abdominal pain.   Genitourinary: Negative for difficulty urinating.   Musculoskeletal: Positive for gait problem. Negative for back pain.   Skin: Negative for color change.   Neurological: Negative for dizziness and headaches.   Psychiatric/Behavioral: Positive for confusion. Negative for agitation.     Objective:     Vital Signs (Most Recent):  Temp: 97.1 °F (36.2 °C) (09/12/18 1141)  Pulse: (!) 48 (09/12/18 1141)  Resp: 16 (09/12/18 1141)  BP: (!) 113/53 (09/12/18 1141)  SpO2: (!) 94 % (09/12/18 1141)    Vital Signs (24h Range):  Temp:  [97 °F (36.1 °C)-97.8 °F (36.6 °C)] 97.1 °F (36.2 °C)  Pulse:  [48-65] 48  Resp:  [14-20] 16  SpO2:  [92 %-99 %] 94 %  BP: (106-154)/(47-69) 113/53     Physical Exam   Constitutional: She appears well-developed and well-nourished.   Eyes:  EOM are normal. Pupils are equal, round, and reactive to light.   Neck: Normal range of motion. Neck supple.   Cardiovascular: Normal rate and regular rhythm.   Pulmonary/Chest: Effort normal and breath sounds normal.   Abdominal: Soft. Normal appearance and bowel sounds are normal. She exhibits no abdominal bruit. There is no rigidity.   Musculoskeletal: Normal range of motion.   Neurological: She is alert.   -  Mental Status: AAOx3. Follows commands.   -  Speech and language: dysarthria present.    -  Vision: difficult with macular degeneration    -  Musculoskeletal: LLE cast intact, wiggling toes   -  Facial movement (CN VII): symmetrical  -  Sensory:  Intact to light touch    Skin: Skin is warm and dry.   Psychiatric: She has a normal mood and affect.     NEUROLOGICAL EXAMINATION:     CRANIAL NERVES     CN III, IV, VI   Pupils are equal, round, and reactive to light.  Extraocular motions are normal.

## 2018-09-12 NOTE — ASSESSMENT & PLAN NOTE
- Elevated on arrival in setting of pain and missed AM meds  - Resume home regimen: Norvasc 5 mg daily, coreg 6.25 mg BID, lisinopril 20 mg daily  - 9/12 controlled, Continue to monitor

## 2018-09-12 NOTE — PLAN OF CARE
09/12/18 1606   Final Note   Assessment Type Final Discharge Note     Patient discharged to [facility] on 9/12/18.

## 2018-09-12 NOTE — PLAN OF CARE
Problem: Fall Risk (Adult)  Goal: Absence of Falls  Patient will demonstrate the desired outcomes by discharge/transition of care.  Outcome: Ongoing (interventions implemented as appropriate)   09/12/18 0359   Fall Risk (Adult)   Absence of Falls making progress toward outcome       Problem: Patient Care Overview  Goal: Plan of Care Review  Outcome: Ongoing (interventions implemented as appropriate)   09/12/18 0359   Coping/Psychosocial   Plan Of Care Reviewed With pat   09/12/18 0359   Coping/Psychosocial   Plan Of Care Reviewed With patient   ient

## 2018-09-12 NOTE — NURSING
Discharge instructions given to patient and family.  Family verbalized understanding and had no further questions.  Patient's IV removed and vital signs within normal limits.  Patient now awaiting wheelchair van for transport to receiving facility.  Will continue to follow up.

## 2018-09-12 NOTE — PLAN OF CARE
Pt has been accepted to [facility].  They will contact the  soon regarding when to set up transport.    Fadumo Mayes, [facility] x [phone]

## 2018-09-12 NOTE — PROGRESS NOTES
"[facility]  Orthopedics  Progress Note    Patient Name: Mitzy Perez  MRN: [id number]  Admission Date: 9/8/2018  Hospital Length of Stay: 2 days  Attending Provider: Karyna Vaughn MD  Primary Care Provider: Marta Michael MD  Follow-up For: Procedure(s) (LRB):  ORIF, ANKLE (Left)  FIXATION, SYNDESMOSIS, ANKLE (Left)    Post-Operative Day: 1 Day Post-Op  Subjective:       Principal Problem: L trimalleolar ankle fracture    Principal Orthopedic Problem: same    Interval History: Patient seen and examined at bedside.  No acute events overnight.  Pain well controlled. Elevated overnight    Review of patient's allergies indicates:  No Known Allergies    Current Facility-Administered Medications   Medication    acetaminophen tablet 650 mg    albuterol-ipratropium 2.5 mg-0.5 mg/3 mL nebulizer solution 3 mL    albuterol-ipratropium 2.5 mg-0.5 mg/3 mL nebulizer solution 3 mL    amLODIPine tablet 5 mg    atorvastatin tablet 40 mg    carvedilol tablet 6.25 mg    enoxaparin injection 30 mg    HYDROcodone-acetaminophen  mg per tablet 1 tablet    HYDROcodone-acetaminophen 5-325 mg per tablet 1 tablet    lisinopril tablet 20 mg    ondansetron disintegrating tablet 8 mg    ondansetron injection 4 mg    sodium chloride 0.9% flush 3 mL    traMADol tablet 50 mg     Objective:     Vital Signs (Most Recent):  Temp: 97.6 °F (36.4 °C) (09/12/18 0732)  Pulse: (!) 48 (09/12/18 0732)  Resp: 14 (09/12/18 0732)  BP: 133/60 (09/12/18 0732)  SpO2: 98 % (09/12/18 0732) Vital Signs (24h Range):  Temp:  [97 °F (36.1 °C)-97.8 °F (36.6 °C)] 97.6 °F (36.4 °C)  Pulse:  [48-71] 48  Resp:  [14-23] 14  SpO2:  [92 %-100 %] 98 %  BP: (106-171)/(47-70) 133/60     Weight: 81 kg (178 lb 9.2 oz)  Height: 4' 9" (144.8 cm)  Body mass index is 38.64 kg/m².      Intake/Output Summary (Last 24 hours) at 9/12/2018 0841  Last data filed at 9/12/2018 0600  Gross per 24 hour   Intake 350 ml   Output --   Net 350 ml "       Ortho/SPM Exam     MSK:   Splint in place.  SILT DP/SP/BARNEY  Brisk cap refill  Warm well perfused extremities  palpable    Significant Labs:   CBC:   Recent Labs   Lab  09/11/18   0336  09/12/18   0403   WBC  9.68  10.23   HGB  10.7*  10.7*   HCT  32.8*  33.5*   PLT  218  181     CMP:   Recent Labs   Lab  09/11/18   0336  09/12/18   0403   NA  136  136   K  4.4  4.3   CL  108  110   CO2  20*  19*   GLU  109  75   BUN  53*  57*   CREATININE  1.4  1.6*   CALCIUM  8.9  8.6*   PROT  7.0  6.2   ALBUMIN  2.3*  2.1*   BILITOT  0.4  0.3   ALKPHOS  110  89   AST  43*  42*   ALT  19  15   ANIONGAP  8  7*   EGFRNONAA  34.3*  29.2*     All pertinent labs within the past 24 hours have been reviewed.    Significant Imaging: I have reviewed all pertinent imaging results/findings.    Assessment/Plan:     Trimalleolar fracture of left ankle    Mitzy Perez is a 85 y.o. female with left trimal ankle fracture dislocation, closed reduction 9/9/18 s/p ORIF 9/11/18  -PT/OT: NWB LLE  - encouraged elevation LLE  - pain control  - dispo: pending pt              Dagmar Wong MD  Orthopedics  [facility]    Att Note:  I agree with the above assessment and plan.    Dontae Negro MD

## 2018-09-12 NOTE — ASSESSMENT & PLAN NOTE
HLD  - Admitted 9/5-9/7 for acute CVA  - Continue home ASA and lipitor 40 mg daily  - PT/OT/SLP; rehab

## 2018-09-12 NOTE — ASSESSMENT & PLAN NOTE
Mitzy Perez is a 85 y.o. female with left trimal ankle fracture dislocation, closed reduction 9/9/18 s/p ORIF 9/11/18  -PT/OT: ROXANNE LLE  - encouraged elevation LLE  - pain control  - dispo: pending pt

## 2018-09-12 NOTE — PLAN OF CARE
Problem: Physical Therapy Goal  Goal: Physical Therapy Goal  Goals to be met by: 9/10/18     Patient will increase functional independence with mobility by performin. Supine to sit with Contact Guard Assistance  2. Sit to supine with Contact Guard Assistance  3. Sit to stand transfer with Moderate Assistance  4. Bed to chair transfer with Maximum Assistance using Rolling Walker or most appropriate device  5. Lower extremity exercise program x 20  reps per handout, with independence     Outcome: Ongoing (interventions implemented as appropriate)  Goals remain appropriate.

## 2018-09-12 NOTE — PROGRESS NOTES
Gave report to Karyna at [facility]. Pts granddaughter is at bedside and involved with care. JOVANNI, ADT nurse provided pt and caretaker with discharge instructions. Pt was provided with pain med before leaving. Lunch being served. Cleared by ST. Luna removed by ADT nurse. NAD noted. waiting for transportation.

## 2018-09-12 NOTE — PLAN OF CARE
Problem: SLP Goal  Goal: SLP Goal  Speech Language Pathology Goals  Goals expected to be met by 9/16/18    1.  Pt will tolerate regular consistency diet w/ thin liquids w/ no overt signs of aspiration.  2.  Pt will complete Speech Language Cognitive evaluation to determine the need for additional goals. MET  3. Pt will recall 3/3 speech strategies   4 pt will complete dysarthria task with 90% intelligibility and min a  5. Pt will complete problem solving task with 80% accy and min cues           Pt awake/alert and cooperative for ST session this date.  Planned for discharge to rehab today.  Recommend continue ST upon discharge.      ROBERT Dior, CCC-SLP  Speech Language Pathologist  [phone]  9/12/2018         Statement Selected

## 2018-09-12 NOTE — PT/OT/SLP PROGRESS
"Physical Therapy Treatment    Patient Name:  Mitzy Perez   MRN:  [id number]    Recommendations:     Discharge Recommendations:  rehabilitation facility   Discharge Equipment Recommendations:   TBD  Barriers to discharge: Inaccessible home and Decreased caregiver support    Assessment:     Mitzy Perez is a 85 y.o. female admitted with a medical diagnosis of Fall.  She presents with the following impairments/functional limitations:  weakness, impaired endurance, impaired self care skills, impaired functional mobilty, gait instability, impaired balance, decreased coordination, decreased lower extremity function, decreased safety awareness, impaired coordination, impaired cardiopulmonary response to activity, orthopedic precautions. Pt tolerated session well with focus on bed mobility, EOB sitting balance, and therex. Pt progressing fairly and remains limited with functional mobility d/t poor safety awareness and pt unable to maintain LLE NWB. Pt will continue to benefit from therapy services to improve impairments listed above.     Rehab Prognosis:  Fair ; patient would benefit from acute skilled PT services to address these deficits and reach maximum level of function.      Recent Surgery: Procedure(s) (LRB):  ORIF, ANKLE (Left)  FIXATION, SYNDESMOSIS, ANKLE (Left) 1 Day Post-Op    Plan:     During this hospitalization, patient to be seen 4 x/week to address the above listed problems via gait training, therapeutic activities, therapeutic exercises, neuromuscular re-education, wheelchair management/training  · Plan of Care Expires:  10/09/18   Plan of Care Reviewed with: patient, family    Subjective     Communicated with NSG prior to session.  Patient found supine upon PTA entry to room, agreeable to treatment.      Chief Complaint: no c/o  Patient comments/goals: "I feel okay."   Pain/Comfort:  · Pain Rating 1: 0/10  · Pain Rating Post-Intervention 1: 0/10    Patients cultural, spiritual, Judaism conflicts " given the current situation: none stated    Objective:     Patient found with: telemetry     General Precautions: Standard, aspiration, fall, vision impaired   Orthopedic Precautions:LLE non weight bearing   Braces: N/A     Functional Mobility:  · Bed Mobility:     · Rolling Left:  contact guard assistance  · Rolling Right: contact guard assistance  · Scooting: maximal assistance  · Supine to Sit: moderate assistance  · Sit to Supine: maximal assistance  · Transfers:   · Scooting along EOB: Max A with pt unsafely attempting to WB on LLE to scoot  · Balance: Pt sits at EOB with SBA for static balance. Pt with occasional cues to correct L lateral lean.       AM-PAC 6 CLICK MOBILITY  Turning over in bed (including adjusting bedclothes, sheets and blankets)?: 3  Sitting down on and standing up from a chair with arms (e.g., wheelchair, bedside commode, etc.): 2  Moving from lying on back to sitting on the side of the bed?: 2  Moving to and from a bed to a chair (including a wheelchair)?: 2  Need to walk in hospital room?: 1  Climbing 3-5 steps with a railing?: 1  Basic Mobility Total Score: 11       Therapeutic Activities and Exercises:  Pt assisted with functional mobility as noted above.   Pt with increased time for decreased assistance for bed mobility.   Pt scoots along EOB initially attempting to WB through LLE. Pt ultimately requires Max A to scoot along EOB and assure safety with NWB LLE.   Pt performs seated BLE therex: AP, GS, LAQ, Seated Marching, Hip ABD/ADD x 30 reps.   Pt with no evidence of learning for education and safety prompts.       Patient left supine with L leg elevated with call button in reach, bed alarm on and NSG notified.    GOALS:   Multidisciplinary Problems     Physical Therapy Goals        Problem: Physical Therapy Goal    Goal Priority Disciplines Outcome Goal Variances Interventions   Physical Therapy Goal     PT, PT/OT Ongoing (interventions implemented as appropriate)     Description:   Goals to be met by: 9/10/18     Patient will increase functional independence with mobility by performin. Supine to sit with Contact Guard Assistance  2. Sit to supine with Contact Guard Assistance  3. Sit to stand transfer with Moderate Assistance  4. Bed to chair transfer with Maximum Assistance using Rolling Walker or most appropriate device  5. Lower extremity exercise program x 20  reps per handout, with independence                      Time Tracking:     PT Received On: 18  PT Start Time: 1054     PT Stop Time: 1124  PT Total Time (min): 30 min     Billable Minutes: Therapeutic Activity 18 and Therapeutic Exercise 12    Treatment Type: Treatment  PT/PTA: PTA     PTA Visit Number: 1     Ulices Soriano PTA  2018

## 2018-09-12 NOTE — NURSING
IM-[facility] notified of patient diminished breathing while eating dinner.  Pt was alert and on 1L of oxygen.  Pt family and pt requested respiratory treatment.  Pt did not have PRN orders for therapist.

## 2018-09-12 NOTE — PT/OT/SLP PROGRESS
"Speech Language Pathology Treatment    Patient Name:  Mitzy Perez   MRN:  [id number]  Admitting Diagnosis: Fall    Recommendations:                 General Recommendations:  Dysphagia therapy, Speech/language therapy and Cognitive-linguistic therapy  Diet recommendations:  Regular, Liquid Diet Level: Thin   Aspiration Precautions: Alternating bites/sips, Monitor for s/s of aspiration, Small bites/sips and Standard aspiration precautions   General Precautions: Standard, aspiration, fall, vision impaired  Communication strategies:  provide increased time to answer     Subjective     "slurred" (family stated regarding pt's speech)    Pain/Comfort:  · Pain Rating 1: 0/10  · Pain Rating Post-Intervention 1: 0/10    Objective:     Has the patient been evaluated by SLP for swallowing?   Yes  Keep patient NPO? No   Current Respiratory Status: room air      Pt seen this afternoon for continued speech tx and monitoring of diet tolerance.  Mid-session, nursing arrived for discharge prep as pt scheduled for discharge to rehab today.  Pt was alert/cooperative.   Speech remains dysarthric.  Speech intelligibility strategies were reviewed and demonstrated.  Encouraged use of strategies to improve overall clarity of verbal output.  Pt demonstrated fair ability to implement in short utterances with fair to good intelligibility.  Ms. Perez was seated upright for intake of lunch meal.  Observed pt with intake of pork, potatoes and thin liquid via straw (approximately 4 oz).  No overt pocketing or anterior bolus loss observed.  No overt s/s of aspiration detected.  Granddaughter reported one episode of coughing with solid intake last night.  Education provided regarding swallow precautions/compensatory strategies to reduce risk for difficulty.  ST also reviewed s/s of aspiration to monitor for.  Session ended as nursing arrived to remove IV for transfer.    Recommend continue ST upon transfer to rehab.    Assessment:     Mitzy" Chris is a 85 y.o. female with an SLP diagnosis of Dysphagia, Dysarthria and Cognitive-Linguistic Impairment.     Goals:   Multidisciplinary Problems     SLP Goals        Problem: SLP Goal    Goal Priority Disciplines Outcome   SLP Goal     SLP Ongoing (interventions implemented as appropriate)   Description:  Speech Language Pathology Goals  Goals expected to be met by 9/16/18    1.  Pt will tolerate regular consistency diet w/ thin liquids w/ no overt signs of aspiration.  2.  Pt will complete Speech Language Cognitive evaluation to determine the need for additional goals. MET  3. Pt will recall 3/3 speech strategies   4 pt will complete dysarthria task with 90% intelligibility and min a  5. Pt will complete problem solving task with 80% accy and min cues                            Plan:     · Patient to be seen:  4 x/week   · Plan of Care expires:  10/08/18  · Plan of Care reviewed with:  patient, family   · SLP Follow-Up:  Yes       Discharge recommendations:  rehabilitation facility       Time Tracking:     SLP Treatment Date:   09/12/18  Speech Start Time:  1306  Speech Stop Time:  1322     Speech Total Time (min):  16 min    Billable Minutes: Speech Therapy Individual 8 and Treatment Swallowing Dysfunction 8    ROBERT Dior, CCC-SLP  Speech Language Pathologist  [phone]  9/12/2018

## 2018-09-12 NOTE — DISCHARGE SUMMARY
[facility] Hospital Medicine  Discharge Summary      Patient Name: Mitzy Perez  MRN: [id number]  Admission Date: 9/8/2018  Hospital Length of Stay: 2 days  Discharge Date and Time:  09/12/2018 1:10 PM  Attending Physician: Karyna Vaughn MD   Discharging Provider: Aida Gold PA-C  Primary Care Provider: Marta Michael MD  [facility] Medicine Team: [facility] HOSP MED F Aida Gold PA-C    HPI:   85 year old female with a PMHx of HTN, macular degeneration, and recent CVA (admitted 9/5-9/7) presenting with L ankle pain s/p mechanical fall at home. Pt was just discharged from [facility] yesterday after admission for CVA. During that admission, recommendations were for patient to go to inpatient rehab; family opted for patient to return home with [facility] and -7 care. Pt arrived home ~7pm when she went to the bathroom. Per granddaughter, pt did not call for assistance. While in the bathroom, patient slipped while reaching to put toilet seat down. Pt denies prodromal symptoms, including chest pain, SOB, lightheadedness, vision/speech changes. She denies LOC and head injury. Family reports they heard her fall and came to her aid immediately. EMS called to transport her to hospital. At the time of my exam, pt only endorses minimal pain to LLE. Denies chest pain, SOB, abdominal pain, N/V/D, fever/chills, vision/speech changes.       In the ED, HDS. Imaging revealed displaced fracture of the lateral malleolus with anterior and medial dislocation of the tibia. Ortho surgery reduced and splinted in the ER; rec NWB LLE; plan for outpatient surgery. Patient admitted for rehab placement.    Procedure(s) (LRB):  ORIF, ANKLE (Left)  FIXATION, SYNDESMOSIS, ANKLE (Left)      Hospital Course:   Pt admitted for rehab placement after mechanical fall at home resulting in trimalleolar fracture of L ankle. Ortho surg rec NWB LLE, ice/elevation. PT/OT/SLP consulted. 9/11 Open treatment of right trimalleolar ankle  fracture with internal fixation of lateral malleolus without fixation of posterior lip.  Open treatment of left ankle syndesmosis injury with internal fixation.  Discharge to [facility]. Ortho recs upon discharge NWB LLE, sutures out in two weeks and she will go into a cast until 6 and then begin range of motion, weightbearing at 8 to 10 weeks postop.         Consults:   Consults (From admission, onward)        Status Ordering Provider     Inpatient consult to Orthopedic Surgery  Once     Provider:  (Not yet assigned)    Completed DENY SALEH     Inpatient consult to Physical Medicine Rehab  Once     Provider:  (Not yet assigned)    Completed ANTONIO DUQUE     Inpatient consult to Physical Medicine Rehab  Once     Provider:  (Not yet assigned)    Completed ANTONIO DUQUE          * Fall    Closed displaced fracture of L lateral malleolus  S/p mechanical fall at home  - Discharged from [facility] [place] 9/7 after admission for CVA (9/5-9/7). During that admission, recommendations were for patient to go to inpatient rehab; family opted for patient to return home with [facility] and -7 [facility]  - Mechanical fall while attempting to go to bathroom without assistance after arriving home  - Imaging revealed displaced fracture of L lateral malleolus with anterior and medial dislocation of the tibia  - Ortho surgery reduced and splinted in the ER; rec NWB LLE and initial plan for outpatient surgery  - Cautious use of opioids in elderly, analgesics PRN  - Family in agreement with patient going to Rehab for continued therapy. Family would like [facility]  - PT/OT/SLP consulted  - Per ortho surg, plan for OR 9/11. NPO at [facility]. Held lovenox.  9/11 Open treatment of right trimalleolar ankle fracture with internal fixation   of lateral malleolus without fixation of posterior lip, Open treatment of left ankle syndesmosis injury with internal fixation  - Discharge to [facility] 9/12 per rehab physician  - Ortho recs: sutures out in two weeks  and she will go into a cast until 6 and then begin range of motion, weightbearing at 8 to 10 weeks postop.                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    HLD  - Admitted 9/5-9/7 for acute CVA  - Continue home ASA and lipitor 40 mg daily  - PT/OT/SLP; rehab        Essential hypertension    - Elevated on arrival in setting of pain and missed AM meds  - Resume home regimen: Norvasc 5 mg daily, coreg 6.25 mg BID, lisinopril 20 mg daily  - 9/12 controlled, Continue to monitor          Final Active Diagnoses:    Diagnosis Date Noted POA    PRINCIPAL PROBLEM:  Fall [W19.XXXA] 09/08/2018 Yes    Trimalleolar fracture of left ankle [S82.852A] 09/08/2018 Yes    Acute ischemic multifocal multiple vascular territories stroke [I63.8] 09/05/2018 Yes    Essential hypertension [I10] 09/05/2018 Yes    Closed displaced fracture of lateral malleolus of left fibula [S82.62XA]  Yes    Mixed hyperlipidemia [E78.2] 09/05/2018 Yes      Problems Resolved During this Admission:    Diagnosis Date Noted Date Resolved POA    Left trimalleolar fracture, closed, initial encounter [S82.852A] 09/08/2018 09/08/2018 Yes       Discharged Condition: good    Disposition: Rehab Facility    Follow Up:  Follow-up Information     Marta Michael MD On 9/14/2018.    Specialty:  Internal Medicine  Why:  Dr. Michael's nurse to call Radha Rutledge with appointment date & time.   Contact information:  [address]  [facility] [address]  [phone]             Jennifer Ravi PA-C On 9/25/2018.    Specialty:  Orthopedic Surgery  Why:  at 9:30 AM  Contact information:  [address]  [facility] [address]  [phone]                 Patient Instructions:      Other restrictions (specify):   Order Comments: Non-weight bearing LLE until 8 weeks post-op     Notify your health care provider if you experience any of the following:  temperature >100.4      Notify your health care provider if you experience any of the following:  increased confusion or weakness     Notify your health care provider if you experience any of the following:  severe uncontrolled pain       Significant Diagnostic Studies: Labs: All labs within the past 24 hours have been reviewed    Pending Diagnostic Studies:     None         Medications:  Reconciled Home Medications:      Medication List      START taking these medications    HYDROcodone-acetaminophen 5-325 mg per tablet  Commonly known as:  NORCO  Take 1 tablet by mouth every 6 (six) hours as needed for Pain.     traMADol 50 mg tablet  Commonly known as:  ULTRAM  Take 1 tablet (50 mg total) by mouth every 6 (six) hours as needed for Pain (unrelieved by tylenol).        CONTINUE taking these medications    amLODIPine 5 MG tablet  Commonly known as:  NORVASC  Take 1 tablet (5 mg total) by mouth once daily.     aspirin 81 MG EC tablet  Commonly known as:  ECOTRIN  Take 81 mg by mouth every evening.     atorvastatin 40 MG tablet  Commonly known as:  LIPITOR  Take 1 tablet (40 mg total) by mouth once daily.     carvedilol 6.25 MG tablet  Commonly known as:  COREG  Take 1 tablet (6.25 mg total) by mouth 2 (two) times daily.     fish oil-omega-3 fatty acids 300-1,000 mg capsule  Take 1 capsule by mouth once daily.     ICAPS ORAL  Take 1 tablet by mouth once daily.     lisinopril 20 MG tablet  Commonly known as:  PRINIVIL,ZESTRIL  Take 1 tablet (20 mg total) by mouth once daily.            Indwelling Lines/Drains at time of discharge:   Lines/Drains/Airways     Epidural Line                 Perineural Analgesia/Anesthesia Assessment (using dermatomes) Epidural 09/11/18 0849 1 day                Time spent on the discharge of patient: > 45 minutes  Patient was seen and examined on the date of discharge and determined to be suitable for discharge.         Aida Gold PA-C  Department of Hospital Medicine  [facility]

## 2018-09-12 NOTE — SUBJECTIVE & OBJECTIVE
"  Principal Problem: L trimalleolar ankle fracture    Principal Orthopedic Problem: same    Interval History: Patient seen and examined at bedside.  No acute events overnight.  Pain well controlled. Elevated overnight    Review of patient's allergies indicates:  No Known Allergies    Current Facility-Administered Medications   Medication    acetaminophen tablet 650 mg    albuterol-ipratropium 2.5 mg-0.5 mg/3 mL nebulizer solution 3 mL    albuterol-ipratropium 2.5 mg-0.5 mg/3 mL nebulizer solution 3 mL    amLODIPine tablet 5 mg    atorvastatin tablet 40 mg    carvedilol tablet 6.25 mg    enoxaparin injection 30 mg    HYDROcodone-acetaminophen  mg per tablet 1 tablet    HYDROcodone-acetaminophen 5-325 mg per tablet 1 tablet    lisinopril tablet 20 mg    ondansetron disintegrating tablet 8 mg    ondansetron injection 4 mg    sodium chloride 0.9% flush 3 mL    traMADol tablet 50 mg     Objective:     Vital Signs (Most Recent):  Temp: 97.6 °F (36.4 °C) (09/12/18 0732)  Pulse: (!) 48 (09/12/18 0732)  Resp: 14 (09/12/18 0732)  BP: 133/60 (09/12/18 0732)  SpO2: 98 % (09/12/18 0732) Vital Signs (24h Range):  Temp:  [97 °F (36.1 °C)-97.8 °F (36.6 °C)] 97.6 °F (36.4 °C)  Pulse:  [48-71] 48  Resp:  [14-23] 14  SpO2:  [92 %-100 %] 98 %  BP: (106-171)/(47-70) 133/60     Weight: 81 kg (178 lb 9.2 oz)  Height: 4' 9" (144.8 cm)  Body mass index is 38.64 kg/m².      Intake/Output Summary (Last 24 hours) at 9/12/2018 0841  Last data filed at 9/12/2018 0600  Gross per 24 hour   Intake 350 ml   Output --   Net 350 ml       Ortho/SPM Exam     MSK:   Splint in place.  SILT DP/SP/BARNEY  Brisk cap refill  Warm well perfused extremities  palpable    Significant Labs:   CBC:   Recent Labs   Lab  09/11/18   0336  09/12/18   0403   WBC  9.68  10.23   HGB  10.7*  10.7*   HCT  32.8*  33.5*   PLT  218  181     CMP:   Recent Labs   Lab  09/11/18   0336  09/12/18   0403   NA  136  136   K  4.4  4.3   CL  108  110   CO2  20*  19* "   GLU  109  75   BUN  53*  57*   CREATININE  1.4  1.6*   CALCIUM  8.9  8.6*   PROT  7.0  6.2   ALBUMIN  2.3*  2.1*   BILITOT  0.4  0.3   ALKPHOS  110  89   AST  43*  42*   ALT  19  15   ANIONGAP  8  7*   EGFRNONAA  34.3*  29.2*     All pertinent labs within the past 24 hours have been reviewed.    Significant Imaging: I have reviewed all pertinent imaging results/findings.

## 2018-09-18 PROBLEM — D62 ACUTE BLOOD LOSS ANEMIA: Status: ACTIVE | Noted: 2018-01-01

## 2018-09-18 PROBLEM — S82.852D CLOSED TRIMALLEOLAR FRACTURE OF LEFT ANKLE WITH ROUTINE HEALING: Status: ACTIVE | Noted: 2018-01-01

## 2018-09-18 PROBLEM — K92.2 UPPER GI BLEED: Status: ACTIVE | Noted: 2018-01-01

## 2018-09-18 PROBLEM — S91.052A: Status: RESOLVED | Noted: 2017-01-01 | Resolved: 2018-01-01

## 2018-09-18 PROBLEM — S81.852A CAT BITE OF LEFT LOWER LEG WITH INFECTION: Status: RESOLVED | Noted: 2017-01-01 | Resolved: 2018-01-01

## 2018-09-18 PROBLEM — L08.9 CAT BITE OF LEFT LOWER LEG WITH INFECTION: Status: RESOLVED | Noted: 2017-01-01 | Resolved: 2018-01-01
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
## 2018-09-18 PROBLEM — E66.01 OBESITIES, MORBID: Status: ACTIVE | Noted: 2018-01-01

## 2018-09-18 NOTE — SUBJECTIVE & OBJECTIVE
Past Medical History:   Diagnosis Date    Arthritis     Cataract     Essential hypertension 9/5/2018    Macular degeneration        Past Surgical History:   Procedure Laterality Date    CATARACT EXTRACTION  2003    LEFT EYE    FIXATION OF SYNDESMOSIS OF ANKLE Left 9/11/2018    Procedure: FIXATION, SYNDESMOSIS, ANKLE;  Surgeon: Dontae Negro MD;  Location: [facility] OR [address] [place] [address];  Service: Orthopedics;  Laterality: Left;    FIXATION, SYNDESMOSIS, ANKLE Left 9/11/2018    Performed by Dontae Negro MD at [facility] OR [address] [place] [address]    OPEN REDUCTION AND INTERNAL FIXATION (ORIF) OF INJURY OF ANKLE Left 9/11/2018    Procedure: ORIF, ANKLE;  Surgeon: Dontae Negro MD;  Location: [facility] OR [address] [place] [address];  Service: Orthopedics;  Laterality: Left;    ORIF, ANKLE Left 9/11/2018    Performed by Dontae Negro MD at [facility] OR [address] [place] [address]       Review of patient's allergies indicates:  No Known Allergies  Family History     Problem Relation (Age of Onset)    Retinal detachment Sister    Thyroid disease Sister        Tobacco Use    Smoking status: Never Smoker    Smokeless tobacco: Never Used   Substance and Sexual Activity    Alcohol use: No    Drug use: Not on file    Sexual activity: Not on file     Review of Systems   Unable to perform ROS: Mental status change     Objective:     Vital Signs (Most Recent):  Temp: 97.9 °F (36.6 °C) (09/18/18 0722)  Pulse: 60 (09/18/18 0722)  Resp: 17 (09/18/18 0722)  BP: (!) 121/57 (09/18/18 0722)  SpO2: 95 % (09/18/18 0722) Vital Signs (24h Range):  Temp:  [97.5 °F (36.4 °C)-98.5 °F (36.9 °C)] 97.9 °F (36.6 °C)  Pulse:  [60-67] 60  Resp:  [17-18] 17  SpO2:  [95 %-99 %] 95 %  BP: (120-169)/(44-72) 121/57     Weight: 80.7 kg (178 lb) (09/17/18 2058)  Body mass index is 38.52 kg/m².      Intake/Output Summary (Last 24 hours) at 9/18/2018 0934  Last data filed at 9/18/2018 0536  Gross per 24 hour   Intake 1100 ml   Output --   Net 1100 ml       Lines/Drains/Airways     Epidural Line                  Perineural Analgesia/Anesthesia Assessment (using dermatomes) Epidural 09/11/18 0849 7 days          Peripheral Intravenous Line                 Peripheral IV - Single Lumen 09/17/18 2232 Anterior;Left Antecubital less than 1 day         Peripheral IV - Single Lumen 09/17/18 2240 Anterior;Proximal;Right Forearm less than 1 day                Physical Exam   Constitutional: No distress.   HENT:   Head: Normocephalic and atraumatic.   Eyes: No scleral icterus.   Cardiovascular: Normal rate and regular rhythm.   Pulmonary/Chest: Effort normal and breath sounds normal.   Abdominal: Soft. Bowel sounds are normal. She exhibits no distension and no mass. There is no tenderness. There is no rebound and no guarding. No hernia.   Musculoskeletal: She exhibits edema.   Left lower extremity with a bandage   Neurological:   Patient opens her eyes to painful stimuli which family states is her usual, she has been having waxing and waning mentation.   Skin: She is not diaphoretic.       Significant Labs:  CBC:   Recent Labs   Lab  09/17/18 2146 09/18/18   0344   WBC  12.15  12.36   HGB  8.5*  5.7*   HCT  27.4*  18.6*   PLT  174  179     CMP:   Recent Labs   Lab  09/17/18 2146 09/18/18   0344   GLU  94  78   CALCIUM  8.8  7.9*   ALBUMIN  1.8*   --    PROT  5.5*   --    NA  141  143   K  4.8  4.8   CO2  19*  21*   CL  115*  117*   BUN  71*  63*   CREATININE  1.2  1.1   ALKPHOS  159*   --    ALT  21   --    AST  49*   --    BILITOT  0.4   --      Coagulation:   Recent Labs   Lab  09/17/18 2146   INR  1.1   APTT  <21.0       Significant Imaging:  Imaging results within the past 24 hours have been reviewed.

## 2018-09-18 NOTE — ANESTHESIA PREPROCEDURE EVALUATION
09/18/2018  Mitzy Perez is a 85 y.o., female.  Patient Active Problem List   Diagnosis    History of blurred vision - Both Eyes    Senile nuclear sclerosis - Right Eye    ARMD (age related macular degeneration) - Both Eyes    Pseudophakia, left eye    Better eye: profound vision impairment; lesser eye: near-total vision impairment    Immunization deficiency    Subacute vaginitis    Elevated troponin    Acute ischemic multifocal multiple vascular territories stroke    Essential hypertension    Mixed hyperlipidemia    Asymptomatic stenosis of right carotid artery    Visual hallucination    Fall    Closed trimalleolar fracture of left ankle with routine healing    Closed displaced fracture of lateral malleolus of left fibula    Upper GI bleed    Acute blood loss anemia    Obesities, morbid     Past Surgical History:   Procedure Laterality Date    CATARACT EXTRACTION  2003    LEFT EYE    FIXATION OF SYNDESMOSIS OF ANKLE Left 9/11/2018    Procedure: FIXATION, SYNDESMOSIS, ANKLE;  Surgeon: Dontae Negro MD;  Location: [facility] OR [address] [place] [address];  Service: Orthopedics;  Laterality: Left;    FIXATION, SYNDESMOSIS, ANKLE Left 9/11/2018    Performed by Dontae Negro MD at [facility] OR [address] [place] [address]    OPEN REDUCTION AND INTERNAL FIXATION (ORIF) OF INJURY OF ANKLE Left 9/11/2018    Procedure: ORIF, ANKLE;  Surgeon: Dontae Negro MD;  Location: [facility] OR [address] [place] [address];  Service: Orthopedics;  Laterality: Left;    ORIF, ANKLE Left 9/11/2018    Performed by Dontae Negro MD at [facility] OR [address] [place] [address]       Anesthesia Evaluation    I have reviewed the Patient Summary Reports.    I have reviewed the Nursing Notes.   I have reviewed the Medications.     Review of Systems      Physical Exam  General:  Well nourished    Airway/Jaw/Neck:  Airway Findings: Mouth Opening: Small, but > 3cm Tongue: Normal  General Airway  Assessment: Adult  Mallampati: II  Improves to II with phonation.  Jaw/Neck Findings:  Limited Ability to Prognath  Neck ROM: Normal ROM     Eyes/Ears/Nose:  Eyes/Ears/Nose Findings:    Dental:  Dental Findings: In tact   Chest/Lungs:  Chest/Lungs Findings: Clear to auscultation, Normal Respiratory Rate     Heart/Vascular:  Heart Findings: Rate: Normal  Rhythm: Regular Rhythm  Sounds: Normal     Abdomen:  Abdomen Findings:  Normal     Musculoskeletal:  Musculoskeletal Findings:    Skin:  Skin Findings:     Mental Status:  Mental Status Findings:  Cooperative, Alert and Oriented         Anesthesia Plan  Type of Anesthesia, risks & benefits discussed:  Anesthesia Type:  general, MAC  Patient's Preference:   Intra-op Monitoring Plan:   Intra-op Monitoring Plan Comments:   Post Op Pain Control Plan:   Post Op Pain Control Plan Comments:   Induction:   IV  Beta Blocker:  Patient is not currently on a Beta-Blocker (No further documentation required).       Informed Consent: Patient understands risks and agrees with Anesthesia plan.  Questions answered. Anesthesia consent signed with patient.  ASA Score: 2     Day of Surgery Review of History & Physical:    H&P update referred to the surgeon.         Ready For Surgery From Anesthesia Perspective.

## 2018-09-18 NOTE — H&P
[facility] Medicine  History & Physical    Patient Name: Mitzy Perez  MRN: [id number]  Admission Date: 9/17/2018  Attending Physician: Jeronimo Gregg MD   Primary Care Provider: Marta Michael MD    [facility] Medicine Team: [facility] MED 3 Rao Vee MD     Patient information was obtained from patient, relative(s), past medical records and ER records.     Subjective:     Principal Problem:<principal problem not specified>    Chief Complaint:   Chief Complaint   Patient presents with    Hematemesis     From [facility]        HPI: Mrs. Mitzy Perez is an 85 year old female with a PMHx of HTN, macular degeneration, recent trimalleolar ankle fracture and recent CVA (admitted 9/5-9/7) presenting from [facility] for a new onset of coffee ground emesis. Per her family, the patient developed abdominal pain Saturday (3 days ago) which was thought to be due to gas and constipation. She describes the pain as epigastric location which was sharp, constant, non-radating, 7/10 in intensity. It was associated with severe fatigue, decreased appetite and abdominal distension. She had not had a bowel movement since Saturday so the SNF had been giving the patient stool softeners. This AM, the patient had 1 episode of coffee ground emesis. A cbc was obtained at the [facility] showing a hemoglobin of 10.9 (baseline 12). She was sent to the ED for evaluation. She denies denies chest pain, shortness of breath, and lightheadedness. Her only anticoagulation at the [facility] was aspirin 81mg and Lovenox 40mg daily.    Of note, the patient recently had a CVA where she was admitted to the hospital. She was discharged home and within a few hours of being at home, the patient had a mechanical fall resulting in a Trimalleolar fracture requiringsurgical repair. She was discharged to a SNF where her family reports she has been participating with therapy but falls asleep shortly after her sessions.     In the ED, the patient  received 1L bolus and a CT abdomen was performed showing Bowel wall thickening within duodenum, proximal jejunum, and ascending colon with mild mesenteric stranding and small volume ascites suggestive of enterocolitis. She was started on ceftriaxone and flagyl. Her hemoglobin was found to be 8.5. She has not had any further episodes of emesis. She has had 1 BM in the ED which was black in color.        Past Medical History:   Diagnosis Date    Arthritis     Cataract     Essential hypertension 9/5/2018    Macular degeneration        Past Surgical History:   Procedure Laterality Date    CATARACT EXTRACTION  2003    LEFT EYE    FIXATION OF SYNDESMOSIS OF ANKLE Left 9/11/2018    Procedure: FIXATION, SYNDESMOSIS, ANKLE;  Surgeon: Dontae Negro MD;  Location: [facility] OR [address] [place] [address];  Service: Orthopedics;  Laterality: Left;    FIXATION, SYNDESMOSIS, ANKLE Left 9/11/2018    Performed by Dontae Negro MD at [facility] OR [address] [place] [address]    OPEN REDUCTION AND INTERNAL FIXATION (ORIF) OF INJURY OF ANKLE Left 9/11/2018    Procedure: ORIF, ANKLE;  Surgeon: Dontae Negro MD;  Location: [facility] OR [address] [place] [address];  Service: Orthopedics;  Laterality: Left;    ORIF, ANKLE Left 9/11/2018    Performed by Dontae Negro MD at [facility] OR [address] [place] [address]       Review of patient's allergies indicates:  No Known Allergies    Current Facility-Administered Medications on File Prior to Encounter   Medication    [DISCONTINUED] 0.9%  NaCl infusion    [DISCONTINUED] acetaminophen tablet    [DISCONTINUED] aspirin chewable tablet    [DISCONTINUED] carvedilol tablet    [DISCONTINUED] GENERIC EXTERNAL MEDICATION    [DISCONTINUED] GENERIC EXTERNAL MEDICATION    [DISCONTINUED] lisinopril tablet    [DISCONTINUED] lisinopril tablet     Current Outpatient Medications on File Prior to Encounter   Medication Sig    amLODIPine (NORVASC) 5 MG tablet Take 1 tablet (5 mg total) by mouth once daily.    aspirin (ECOTRIN) 81 MG EC tablet Take 81 mg by mouth every  evening.     atorvastatin (LIPITOR) 40 MG tablet Take 1 tablet (40 mg total) by mouth once daily.    carvedilol (COREG) 6.25 MG tablet Take 1 tablet (6.25 mg total) by mouth 2 (two) times daily.    lisinopril (PRINIVIL,ZESTRIL) 20 MG tablet Take 1 tablet (20 mg total) by mouth once daily.    BETA-CAROTENE,A, W-C & E/MIN (ICAPS ORAL) Take 1 tablet by mouth once daily.    fish oil-omega-3 fatty acids 300-1,000 mg capsule Take 1 capsule by mouth once daily.     HYDROcodone-acetaminophen (NORCO) 5-325 mg per tablet Take 1 tablet by mouth every 6 (six) hours as needed for Pain.    traMADol (ULTRAM) 50 mg tablet Take 1 tablet (50 mg total) by mouth every 6 (six) hours as needed for Pain (unrelieved by tylenol).     Family History     Problem Relation (Age of Onset)    Retinal detachment Sister    Thyroid disease Sister        Tobacco Use    Smoking status: Never Smoker    Smokeless tobacco: Never Used   Substance and Sexual Activity    Alcohol use: No    Drug use: Not on file    Sexual activity: Not on file     Review of Systems   Constitutional: Positive for fatigue. Negative for activity change, chills and fever.   HENT: Negative for congestion, postnasal drip, rhinorrhea and sinus pressure.    Eyes: Negative for discharge, redness and visual disturbance.   Respiratory: Negative for cough and shortness of breath.    Cardiovascular: Negative for chest pain and palpitations.   Gastrointestinal: Positive for abdominal distention, abdominal pain, blood in stool and constipation. Negative for diarrhea, nausea and vomiting.   Endocrine: Negative for cold intolerance and heat intolerance.   Genitourinary: Negative for dysuria.   Musculoskeletal: Negative for arthralgias and back pain.   Skin: Negative for color change and pallor.   Neurological: Positive for weakness. Negative for dizziness, seizures, speech difficulty and headaches.   Psychiatric/Behavioral: Negative for agitation, sleep disturbance and suicidal  ideas. The patient is not nervous/anxious.      Objective:     Vital Signs (Most Recent):  Temp: 98.5 °F (36.9 °C) (09/17/18 2058)  Pulse: 62 (09/18/18 0224)  Resp: 18 (09/18/18 0224)  BP: (!) 169/72 (09/18/18 0224)  SpO2: 99 % (09/18/18 0224) Vital Signs (24h Range):  Temp:  [98.5 °F (36.9 °C)] 98.5 °F (36.9 °C)  Pulse:  [60-67] 62  Resp:  [17-18] 18  SpO2:  [96 %-99 %] 99 %  BP: (139-169)/(44-72) 169/72     Weight: 80.7 kg (178 lb)  Body mass index is 38.52 kg/m².    Physical Exam   Constitutional: She is oriented to person, place, and time. She appears well-developed and well-nourished.   Patient is lethargic   Eyes: Pupils are equal, round, and reactive to light.   Patient is blind   Neck: Normal range of motion. Neck supple.   Cardiovascular: Normal rate, regular rhythm and normal heart sounds.   No murmur heard.  Pulmonary/Chest: Effort normal and breath sounds normal. No stridor. No respiratory distress. She has no wheezes.   Abdominal: Soft. Bowel sounds are normal. She exhibits distension. There is tenderness (mid epigastric region). There is no guarding.   Musculoskeletal: Normal range of motion. She exhibits no edema or deformity.   Neurological: She is alert and oriented to person, place, and time. Coordination normal.   Skin: Skin is warm and dry. Capillary refill takes less than 2 seconds. No erythema.         CRANIAL NERVES     CN III, IV, VI   Pupils are equal, round, and reactive to light.     Significant Labs:   BMP:   Recent Labs   Lab  09/17/18 2146   GLU  94   NA  141   K  4.8   CL  115*   CO2  19*   BUN  71*   CREATININE  1.2   CALCIUM  8.8     CBC:   Recent Labs   Lab  09/17/18 2146   WBC  12.15   HGB  8.5*   HCT  27.4*   PLT  174       Significant Imaging: CT: I have reviewed all pertinent results/findings within the past 24 hours and my personal findings are:  Bowel wall thickening within duodenum, proximal jejunum, and ascending colon with mild mesenteric stranding and small volume  ascites.  Findings are favored to represent a nonspecific enterocolitis     Assessment/Plan:     Upper GI bleed    Patient presenting with a 3 day history of abdominal pain. Ddx include enterocolitis vs peptic ulcer disease vs esophagitis vs MS tear vs gastroenteritis. Acute drop in hemoglobin from 10 to 8.5 (baseline 12). Remote history of decreased appetite, abdominal pain. No changes in diet. Only anticoagulation is ASA and Ppx dose lovenox.     Plan:   - Started patient on Ceftriaxone 1g Daily and Flagyl q8.   - IV Protonix  - NPO for possible scope  - Will trend CBC q8.   - Patient consented for blood and placed in chart.   - Will transfuse with Hemoglobin <7 or if patient becomes symptomatic.   - Holding beta blocker in the setting of acute bleed.   - GI consult.         Trimalleolar fracture of left ankle    - Recent hx of a fall causing trimalliolar fracture.   - Repaired by ortho.   - PT/OT eval after stabilization.         Mixed hyperlipidemia    - Holding home statin.   - Essential meds for possible scope tomorrow.         Essential hypertension    - Holding home coreg due to setting of GI bleed.   - Will continue home Amlodipine to manage mild HTN on admission.         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   - Holding ASA due to GI bleed.   - Continue once H/H stable.           VTE Risk Mitigation (From admission, onward)        Ordered     IP VTE HIGH RISK PATIENT  Once      09/18/18 0202        Rao Vee MD  Department of Hospital Medicine   [facility]

## 2018-09-18 NOTE — PROGRESS NOTES
Picked patient up from x-ray.  NG tube appears to be dislodged by ~ 6 inches.  Tube readvanced.  Dr. Mackay notified and said he will order a new x-ray for confirmation.

## 2018-09-18 NOTE — HPI
Mrs. Mitzy Perez is an 85 year old female with a PMHx of HTN, macular degeneration, recent trimalleolar ankle fracture and recent CVA (admitted 9/5-9/7) presenting from SNF for a new onset of coffee ground emesis. Per her family, the patient developed abdominal pain Saturday (3 days ago) which was thought to be due to gas and constipation. She describes the pain as epigastric location which was sharp, constant, non-radating, 7/10 in intensity. It was associated with severe fatigue, decreased appetite and abdominal distension. She had not had a bowel movement since Saturday so the SNF had been giving the patient stool softeners. This AM, the patient had 1 episode of coffee ground emesis. A cbc was obtained at the SNF showing a hemoglobin of 10.9 (baseline 12). She was sent to the ED for evaluation. She denies denies chest pain, shortness of breath, and lightheadedness. Her only anticoagulation at the SNF was aspirin 81mg and Lovenox 40mg daily.    Of note, the patient recently had a CVA where she was admitted to the hospital. She was discharged home and within a few hours of being at home, the patient had a mechanical fall resulting in a Trimalleolar fracture requiringsurgical repair. She was discharged to a SNF where her family reports she has been participating with therapy but falls asleep shortly after her sessions.     In the ED, the patient received 1L bolus and a CT abdomen was performed showing Bowel wall thickening within duodenum, proximal jejunum, and ascending colon with mild mesenteric stranding and small volume ascites suggestive of enterocolitis. She was started on ceftriaxone and flagyl. Her hemoglobin was found to be 8.5. She has not had any further episodes of emesis. She has had 1 BM in the ED which was black in color.

## 2018-09-18 NOTE — ED NOTES
Patient and patient's family offered semi-private room.   Requesting a private room  Nursing Supervisor notified

## 2018-09-18 NOTE — ED NOTES
Pt resting comfortably and independently repositioned in stretcher with bed locked in lowest position for safety. NAD noted at this time. Respirations even and unlabored and visible chest rise noted.  Patient offered bathroom assistance and denies need at this time. Pt instructed to call if assistance is needed. Pt on continuous cardiac, BP, and O2 monitoring. Call light within reach. Family at bedside. No needs at this time. Will continue to monitor.     Blood transfusion in progress  Patient tolerating well  No complications noted

## 2018-09-18 NOTE — [employer] PATIENT INSTRUCTIONS
Discharge Summary/Instructions after an Endoscopic Procedure  Patient Name: Mitzy Perez  Patient MRN: [id number]  Patient YOB: 1933 Tuesday, September 18, 2018  Hayder Pradhan MD  RESTRICTIONS:  During your procedure today, you received medications for sedation.  These   medications may affect your judgment, balance and coordination.  Therefore,   for 24 hours, you have the following restrictions:   - DO NOT drive a car, operate machinery, make legal/financial decisions,   sign important papers or drink alcohol.    ACTIVITY:  Today: no heavy lifting, straining or running due to procedural   sedation/anesthesia.  The following day: return to full activity including work.  DIET:  Eat and drink normally unless instructed otherwise.     TREATMENT FOR COMMON SIDE EFFECTS:  - Mild abdominal pain, nausea, belching, bloating or excessive gas:  rest,   eat lightly and use a heating pad.  - Sore Throat: treat with throat lozenges and/or gargle with warm salt   water.  - Because air was used during the procedure, expelling large amounts of air   from your rectum or belching is normal.  - If a bowel prep was taken, you may not have a bowel movement for 1-3 days.    This is normal.  SYMPTOMS TO WATCH FOR AND REPORT TO YOUR PHYSICIAN:  1. Abdominal pain or bloating, other than gas cramps.  2. Chest pain.  3. Back pain.  4. Signs of infection such as: chills or fever occurring within 24 hours   after the procedure.  5. Rectal bleeding, which would show as bright red, maroon, or black stools.   (A tablespoon of blood from the rectum is not serious, especially if   hemorrhoids are present.)  6. Vomiting.  7. Weakness or dizziness.  GO DIRECTLY TO THE NEAREST EMERGENCY ROOM IF YOU HAVE ANY OF THE FOLLOWING:      Difficulty breathing              Chills and/or fever over 101 F   Persistent vomiting and/or vomiting blood   Severe abdominal pain   Severe chest pain   Black, tarry stools   Bleeding- more than one  tablespoon   Any other symptom or condition that you feel may need urgent attention  Your doctor recommends these additional instructions:  If any biopsies were taken, your doctors clinic will contact you in 1 to 2   weeks with any results.  - Return patient to [facility] for ongoing care.   - NPO.   - Continue present medications. IV PPI drip for 72 hours.  - Consult general surgery to have following in case she develops signs of   perforation.   - Cipro (ciprofloxacin) 400 mg IV q 12 hr.   - Flagyl (metronidazole) 500 mg IV loading dose followed by 500 mg IV q 6   hr.  For questions, problems or results please call your physician - Hayder Pradhan MD at Work:  [phone].  [facility], EMERGENCY ROOM PHONE NUMBER: [phone]  IF A COMPLICATION OR EMERGENCY SITUATION ARISES AND YOU ARE UNABLE TO REACH   YOUR PHYSICIAN - GO DIRECTLY TO THE EMERGENCY ROOM.  Hayder Pradhan MD  9/18/2018 4:08:26 PM  This report has been verified and signed electronically.  [employer]

## 2018-09-18 NOTE — ED PROVIDER NOTES
Encounter Date: 9/17/2018       History     Chief Complaint   Patient presents with    Hematemesis     From [facility]     The history is provided by the patient, medical records and a relative.   GI Problem   The current episode started two days ago. The problem has not changed since onset.The abdominal pain is located in the epigastric region.   The other symptoms of the illness include fatigue and vomiting. The other symptoms of the illness do not include fever, shortness of breath, nausea or dysuria.   The vomiting began today. Vomiting occurred once.   Additional symptoms associated with the illness include constipation. Symptoms associated with the illness do not include diaphoresis or hematuria.     84 yo woman with HTN, h/o CVA, and h/o left trimalleolar fx s/p surgery currently in [facility] transferred from [facility] for abdominal pain, new, epigastric, sharp, started 2 days ago, constant, associated with decreased appetite, fatigue, constipation, abdominal distension, and one episode of coffee-ground emesis just prior to arrival. Denies chest pain, shortness of breath, and lightheadedness. Receiving aspirin and Lovenox in SNF. Baseline blind.    Review of patient's allergies indicates:  No Known Allergies  Past Medical History:   Diagnosis Date    Arthritis     Cataract     Essential hypertension 9/5/2018    Macular degeneration      Past Surgical History:   Procedure Laterality Date    CATARACT EXTRACTION  2003    LEFT EYE    FIXATION OF SYNDESMOSIS OF ANKLE Left 9/11/2018    Procedure: FIXATION, SYNDESMOSIS, ANKLE;  Surgeon: Dontae Negro MD;  Location: [facility] OR [address] [place] [address];  Service: Orthopedics;  Laterality: Left;    FIXATION, SYNDESMOSIS, ANKLE Left 9/11/2018    Performed by Dontae Negro MD at [facility] OR [address] [place] [address]    OPEN REDUCTION AND INTERNAL FIXATION (ORIF) OF INJURY OF ANKLE Left 9/11/2018    Procedure: ORIF, ANKLE;  Surgeon: Dontae Negro MD;  Location: [facility] OR [address] [place] [address];  Service:  Orthopedics;  Laterality: Left;    ORIF, ANKLE Left 9/11/2018    Performed by Dontae Negro MD at [facility] OR [address] [place] [address]     Family History   Problem Relation Age of Onset    Retinal detachment Sister     Thyroid disease Sister     Amblyopia Neg Hx     Blindness Neg Hx     Cancer Neg Hx     Cataracts Neg Hx     Diabetes Neg Hx     Glaucoma Neg Hx     Hypertension Neg Hx     Macular degeneration Neg Hx     Strabismus Neg Hx     Stroke Neg Hx      Social History     Tobacco Use    Smoking status: Never Smoker    Smokeless tobacco: Never Used   Substance Use Topics    Alcohol use: No    Drug use: Not on file     Review of Systems   Constitutional: Positive for appetite change and fatigue. Negative for diaphoresis and fever.   HENT: Negative for drooling and facial swelling.    Eyes: Negative for discharge and redness.   Respiratory: Negative for shortness of breath and stridor.    Cardiovascular: Negative for chest pain and leg swelling.   Gastrointestinal: Positive for abdominal distention, abdominal pain, constipation and vomiting. Negative for nausea.   Genitourinary: Negative for dysuria and hematuria.   Musculoskeletal: Negative for joint swelling and neck stiffness.   Skin: Negative for rash and wound.   Neurological: Negative for facial asymmetry, speech difficulty and light-headedness.   Psychiatric/Behavioral: Negative for agitation and confusion.       Physical Exam     Initial Vitals [09/17/18 2058]   BP Pulse Resp Temp SpO2   (!) 139/44 60 17 98.5 °F (36.9 °C) 96 %      MAP       --         Physical Exam    Nursing note and vitals reviewed.  Constitutional: She appears well-developed and well-nourished. She is not diaphoretic. No distress.   HENT:   Head: Normocephalic and atraumatic.   Right Ear: External ear normal.   Left Ear: External ear normal.   Nose: Nose normal.   Mouth/Throat: Oropharynx is clear and moist. Mucous membranes are dry.   Eyes: Conjunctivae are normal. Right eye exhibits  no discharge. Left eye exhibits no discharge. No scleral icterus.   Neck: Neck supple. No thyromegaly present. No tracheal deviation present. No JVD present.   Cardiovascular: Normal rate, regular rhythm and intact distal pulses. Exam reveals no gallop and no friction rub.    Murmur heard.   Systolic murmur is present with a grade of 2/6.  Pulmonary/Chest: Breath sounds normal. No stridor. No respiratory distress. She has no wheezes. She has no rhonchi. She has no rales. She exhibits no tenderness.   Abdominal: Soft. She exhibits no distension. Bowel sounds are increased. There is no tenderness. There is no rebound and no guarding.   Genitourinary: Rectal exam shows guaiac positive stool. Rectal exam shows anal tone normal. Guaiac positive stool. : Acceptable.  Genitourinary Comments: Gross melena   Musculoskeletal: Normal range of motion. She exhibits edema (1+ pitting to RLE; LLE obscured by casting).   Cast in place left lower leg; toes pink with normal ROM   Lymphadenopathy:     She has no cervical adenopathy.   Neurological: She is alert and oriented to person, place, and time.   Skin: Skin is warm and dry. Ecchymosis (lower abdomen) noted.         ED Course   Procedures  Labs Reviewed   CBC W/ AUTO DIFFERENTIAL - Abnormal; Notable for the following components:       Result Value    RBC 2.87 (*)     Hemoglobin 8.5 (*)     Hematocrit 27.4 (*)     MCHC 31.0 (*)     RDW 16.8 (*)     Immature Granulocytes 0.8 (*)     Gran # (ANC) 8.9 (*)     Immature Grans (Abs) 0.10 (*)     Mono # 1.3 (*)     Gran% 73.4 (*)     Lymph% 11.5 (*)     All other components within normal limits   COMPREHENSIVE METABOLIC PANEL - Abnormal; Notable for the following components:    Chloride 115 (*)     CO2 19 (*)     BUN, Bld 71 (*)     Total Protein 5.5 (*)     Albumin 1.8 (*)     Alkaline Phosphatase 159 (*)     AST 49 (*)     Anion Gap 7 (*)     eGFR if  47.6 (*)     eGFR if non  41.3 (*)      All other components within normal limits   CULTURE, BLOOD   CULTURE, BLOOD   PROTIME-INR   APTT   LIPASE   LACTIC ACID, PLASMA   TYPE & SCREEN          Imaging Results          CT Abdomen Pelvis With Contrast (Final result)     Abnormal  Result time 09/18/18 00:07:47    Final result by Reynaldo Hardy MD (09/18/18 00:07:47)                 Impression:      1. Bowel wall thickening within duodenum, proximal jejunum, and ascending colon with mild mesenteric stranding and small volume ascites.  Findings are favored to represent a nonspecific enterocolitis which may be due to inflammatory, infectious, or ischemic etiology.  2. Atherosclerosis of the coronary arteries, aorta, and branch vessels.  Including the celiac axis, SMA and MEMO origins  3. Cholelithiasis without evidence for cholecystitis.  4. Diverticulosis coli.  5. Additional findings as detailed above.  This report was flagged in Epic as abnormal.    Electronically signed by resident: Bj Matson  Date:    09/17/2018  Time:    23:06    Electronically signed by: Reynaldo Hardy MD  Date:    09/18/2018  Time:    00:07             Narrative:    EXAMINATION:  CT ABDOMEN PELVIS WITH CONTRAST    CLINICAL HISTORY:  Abdominal distension;    TECHNIQUE:  The patient was surveyed from the lung bases through the pelvis after the administration of 75 cc Omni 350 IV contrast. The data was reconstructed for coronal, sagittal, and axial images.    COMPARISON:  None.    FINDINGS:  There is left basilar atelectasis and volume loss.  No pleural effusion is seen.    The heart is not enlarged and demonstrates calcification of the aortic valve and mitral valve.  There is coronary artery atherosclerosis.  No pericardial effusion.    The liver is normal in size and attenuation.  No focal hepatic abnormality is seen. The gallbladder contains stones.  No intrahepatic or extrahepatic biliary ductal dilatation is identified.  The portal vein, splenic vein, and SMV are  patent.    A small hiatal hernia is present.  The stomach, spleen, pancreas, and adrenal glands are unremarkable.    The kidneys are small in size bilaterally.  They concentrate and excrete contrast appropriately.  No hydronephrosis is seen.  There is a small amount of right perinephric fluid.  The ureters appear normal in course and caliber. The urinary bladder is unremarkable. The uterus is unremarkable.    The visualized loops of small and large bowel show no evidence of obstruction.  There is bowel wall thickening within the duodenum and proximal jejunum as well as the ascending colon.  The appendix is unremarkable.  There is scattered diverticulosis without evidence for diverticulitis.    The mild mesenteric stranding is present throughout the upper abdomen and along the ascending colon.  There is small volume ascites throughout the abdomen and pelvis.  No intraperitoneal free air is identified.  There is no evidence of lymph node enlargement in the abdomen or pelvis.  The abdominal aorta is normal in course and caliber with significant atherosclerotic calcifications which extend into the branch vessels.    The osseous structures demonstrate degenerative changes.  No acute fracture or osseous destructive process. There is a small fat containing umbilical hernia.  Foci of air and increased attenuation within the soft tissues of the anterior abdominal wall likely represent injection sites.                                       APC / Resident Notes:   HO-IV MDM  Ddx: upper GI bleed, lower GI bleed, ulcer, gastritis, obstruction, malignancy, diverticulitis, AVM, polyp.    GI bleeding and obstruction work-up including CT a/p with IV contrast, abdominal labs, orthostatics, and pantoprazole 80 mg IV.    Srinivasa Malik, PGY4 10:09 PM 09/17/2018       Orthostatic negative. CBC with WBC 12k, anemia below prior at 8.5 and 27.4. Coags wnl. Lactate 1.3. CMP with bicarb 19, BUN 71, albumin 1.8, AST 49.    CT a/p with IV  contrast with enterocolitis. US with cholelithiasis without cholecystitis.    Giving ceftriaxone and metronidazole IV for enterocolitis.    Admitting patient to internal medicine for upper GI bleed.    Srinivasa Malik, PGY4 1:28 AM 09/18/2018          Attending Attestation:   Physician Attestation Statement for Resident:  As the supervising MD   Physician Attestation Statement: I have personally seen and examined this patient.   I agree with the above history. -:   As the supervising MD I agree with the above PE.    As the supervising MD I agree with the above treatment, course, plan, and disposition.                       Clinical Impression:   The primary encounter diagnosis was Upper GI bleed. Diagnoses of ARMD (age related macular degeneration) - Both Eyes, Essential hypertension, Mixed hyperlipidemia, Epigastric pain, Hematemesis without nausea, Enterocolitis, Calculus of gallbladder without cholecystitis without obstruction, Diverticulosis of large intestine with hemorrhage, Umbilical hernia without obstruction and without gangrene, Atherosclerosis of aorta, and Atherosclerosis of native coronary artery of native heart without angina pectoris were also pertinent to this visit.      Disposition:   Disposition: Admitted  Condition: Stable                        Srinivasa Malik MD  Resident  09/18/18 0128

## 2018-09-18 NOTE — PROGRESS NOTES
Received word that NG tube might be in trach, not in esophagus.  Patient's saturations beginning to drop to 85%, was stable before now.  NG tube from left nare removed.  New NG tube placed into right nare.  Patient tolerated well.  Awaiting x-ray confirmation of placement.

## 2018-09-18 NOTE — ASSESSMENT & PLAN NOTE
Patient presenting with a 3 day history of abdominal pain. Ddx include enterocolitis vs peptic ulcer disease vs esophagitis vs MS tear vs gastroenteritis. Acute drop in hemoglobin from 10 to 8.5 (baseline 12). Remote history of decreased appetite, abdominal pain. No changes in diet. Only anticoagulation is ASA and Ppx dose lovenox.     Plan:   - Started patient on Ceftriaxone 1g Daily and Flagyl q8.   - IV Protonix  - NPO for possible scope  - Will trend CBC q8.   - Patient consented for blood and placed in chart.   - Holding beta blocker in the setting of acute bleed.   - GI consult.

## 2018-09-18 NOTE — INTERVAL H&P NOTE
Pre-Procedure H and P Addendum    Patient seen and examined.  History and exam unchanged from prior history and physical.      Procedure: EGD  Indication: Upper GI bleeding  ASA Class: per anesthesiology  Airway: normal  Neck Mobility: full range of motion  Mallampatti score: per anesthesia  History of anesthesia problems: no  Family history of anesthesia problems: no  Anesthesia Plan: MAC    Anesthesia/Surgery risks, benefits and alternative options discussed and understood by patient/family.          Active Hospital Problems    Diagnosis  POA    *Acute blood loss anemia [D62]  Yes    Upper GI bleed [K92.2]  Yes    Obesities, morbid [E66.01]  Yes    Closed trimalleolar fracture of left ankle with routine healing [S82.852D]  Not Applicable    Mixed hyperlipidemia [E78.2]  Yes    Essential hypertension [I10]  Yes    Acute ischemic multifocal multiple vascular territories stroke [I63.8]  Yes      Resolved Hospital Problems   No resolved problems to display.

## 2018-09-18 NOTE — ASSESSMENT & PLAN NOTE
- Recent hx of a fall causing trimalliolar fracture.   - Repaired by ortho.   - PT/OT eval after stabilization.

## 2018-09-18 NOTE — MEDICAL/APP STUDENT
"HISTORY & PHYSICAL  Hospital Medicine    Team: [facility]OS[facility] MED 3    PRESENTING HISTORY     Chief Complaint/Reason for Admission:  Hematemesis     History of Present Illness:  Ms. Mitzy Perez is a 85 y.o. female who presented with vomiting 1x with coffee colored blood. Onset of symptoms was abrupt starting Monday night (9/17/18). Patient's family endorses 1x vomiting without blood on Friday (8/14/18), tight/"ants biting" epigastricabdominal pain since Saturday (9/15/18), and worsening abdominal distention and constipation for the last two weeks. Patient denied subsequent nausea or vomiting and stated that her abdominal pain felt better after the episode of hematemesis.  Patient's     Patient's family has also observed "black/tar-like" blood in the patient's stools since arriving at the hospital.        Review of Systems:  Visual: clinically blind    Respiratory: no cough or shortness of breath  Cardiovascular: no chest pain or palpitations  Gastrointestinal: positive for constipation, vomiting and abdominal pain, hematochezia, hematemesis  Genitourinary: no hematuria or dysuria  Musculoskeletal: positive for left tibial/fibular fracture   Behavioral/Psych: positive for decreased energy    PAST HISTORY:     Past Medical History:   Diagnosis Date    Arthritis     Cataract     Essential hypertension 9/5/2018    Macular degeneration        Past Surgical History:   Procedure Laterality Date    CATARACT EXTRACTION  2003    LEFT EYE    FIXATION OF SYNDESMOSIS OF ANKLE Left 9/11/2018    Procedure: FIXATION, SYNDESMOSIS, ANKLE;  Surgeon: Dontae Negro MD;  Location: [facility] OR [address] [place] [address];  Service: Orthopedics;  Laterality: Left;    FIXATION, SYNDESMOSIS, ANKLE Left 9/11/2018    Performed by Dontae Negro MD at [facility] OR [address] [place] [address]    OPEN REDUCTION AND INTERNAL FIXATION (ORIF) OF INJURY OF ANKLE Left 9/11/2018    Procedure: ORIF, ANKLE;  Surgeon: Dontae Negro MD;  Location: [facility] OR [address] [place] [address];  Service: Orthopedics;  " Laterality: Left;    ORIF, ANKLE Left 9/11/2018    Performed by Dontae Negro MD at [facility] OR [address] [place] [address]       Family History   Problem Relation Age of Onset    Retinal detachment Sister     Thyroid disease Sister     Amblyopia Neg Hx     Blindness Neg Hx     Cancer Neg Hx     Cataracts Neg Hx     Diabetes Neg Hx     Glaucoma Neg Hx     Hypertension Neg Hx     Macular degeneration Neg Hx     Strabismus Neg Hx     Stroke Neg Hx        Social History     Socioeconomic History    Marital status:      Spouse name: None    Number of children: None    Years of education: None    Highest education level: None   Social Needs    Financial resource strain: None    Food insecurity - worry: None    Food insecurity - inability: None    Transportation needs - medical: None    Transportation needs - non-medical: None   Occupational History    None   Tobacco Use    Smoking status: Never Smoker    Smokeless tobacco: Never Used   Substance and Sexual Activity    Alcohol use: No    Drug use: None    Sexual activity: None   Other Topics Concern    None   Social History Narrative    None       MEDICATIONS & ALLERGIES:     Current Facility-Administered Medications on File Prior to Encounter   Medication Dose Route Frequency Provider Last Rate Last Dose    [DISCONTINUED] 0.9%  NaCl infusion  50 mL/hr Intravenous  Generic External Data Provider        [DISCONTINUED] acetaminophen tablet  650 mg Oral  Generic External Data Provider        [DISCONTINUED] aspirin chewable tablet  81 mg Oral  Generic External Data Provider        [DISCONTINUED] carvedilol tablet  3.125 mg Oral  Generic External Data Provider        [DISCONTINUED] GENERIC EXTERNAL MEDICATION  80 mg Oral  Generic External Data Provider        [DISCONTINUED] GENERIC EXTERNAL MEDICATION  120 mg Oral  Generic External Data Provider        [DISCONTINUED] lisinopril tablet  40 mg Oral  Generic External Data Provider        [DISCONTINUED]  lisinopril tablet  20 mg Oral  Generic External Data Provider         Current Outpatient Medications on File Prior to Encounter   Medication Sig Dispense Refill    amLODIPine (NORVASC) 5 MG tablet Take 1 tablet (5 mg total) by mouth once daily. 30 tablet 11    aspirin (ECOTRIN) 81 MG EC tablet Take 81 mg by mouth every evening.       atorvastatin (LIPITOR) 40 MG tablet Take 1 tablet (40 mg total) by mouth once daily. 90 tablet 3    carvedilol (COREG) 6.25 MG tablet Take 1 tablet (6.25 mg total) by mouth 2 (two) times daily. 60 tablet 11    lisinopril (PRINIVIL,ZESTRIL) 20 MG tablet Take 1 tablet (20 mg total) by mouth once daily. 90 tablet 3    BETA-CAROTENE,A, W-C & E/MIN (ICAPS ORAL) Take 1 tablet by mouth once daily.      fish oil-omega-3 fatty acids 300-1,000 mg capsule Take 1 capsule by mouth once daily.       HYDROcodone-acetaminophen (NORCO) 5-325 mg per tablet Take 1 tablet by mouth every 6 (six) hours as needed for Pain. 30 tablet 0    traMADol (ULTRAM) 50 mg tablet Take 1 tablet (50 mg total) by mouth every 6 (six) hours as needed for Pain (unrelieved by tylenol).          Review of patient's allergies indicates:  No Known Allergies    OBJECTIVE:     Vital Signs:  Temp:  [97.5 °F (36.4 °C)-98.5 °F (36.9 °C)] 97.9 °F (36.6 °C)  Pulse:  [60-67] 60  Resp:  [17-18] 17  SpO2:  [95 %-99 %] 95 %  BP: (120-169)/(44-72) 121/57  Body mass index is 38.52 kg/m².     Physical Exam:  Physical Exam   Constitutional: She is sleeping.   Pulmonary/Chest: Effort normal and breath sounds normal. No accessory muscle usage or stridor. No respiratory distress. She has no wheezes.   Abdominal: Soft. Bowel sounds are normal. She exhibits no mass. There is no tenderness.   MSK: Left foot in cast   Cardo: not performed         Laboratory  Lab Results   Component Value Date    WBC 12.36 09/18/2018    HGB 5.7 (LL) 09/18/2018    HCT 18.6 (LL) 09/18/2018    MCV 98 09/18/2018     09/18/2018     Recent Labs   Lab   09/18/18   0344   GLU  78   NA  143   K  4.8   CL  117*   CO2  21*   BUN  63*   CREATININE  1.1   CALCIUM  7.9*   MG  1.9     Lab Results   Component Value Date    INR 1.1 09/17/2018    INR 1.1 09/08/2018    INR 1.1 09/06/2018     Lab Results   Component Value Date    HGBA1C 5.0 09/08/2018     No results for input(s): POCTGLUCOSE in the last 72 hours.    Diagnostic Results:      ASSESSMENT & PLAN:     Current Problems List:  Active Hospital Problems    Diagnosis  POA    *Upper GI bleed [K92.2]  Yes    Trimalleolar fracture of left ankle [S82.852A]  Yes    Mixed hyperlipidemia [E78.2]  Yes    Essential hypertension [I10]  Yes    Acute ischemic multifocal multiple vascular territories stroke [I63.8]  Yes      Resolved Hospital Problems   No resolved problems to display.           Problem Assessment & Treatment Plan:    1. Hematemesis   - EsophagoGastroDuodenoscopy scheduled   - Lower GI scopy / Colonoscopy   - Keep Nil by mouth   - Anti-emetics PRN     2. Hypertension   - Amlodipine   - Lisinopril    3. Left ankle Fracture   - Analgesics PRN            Signing Physician:  Reid Ramon

## 2018-09-18 NOTE — ASSESSMENT & PLAN NOTE
- Holding home coreg due to setting of GI bleed.   - Will continue home Amlodipine to manage mild HTN on admission.

## 2018-09-18 NOTE — NURSING TRANSFER
Nursing Transfer Note      9/18/2018     Transfer To: x-ray    Transfer via stretcher    Transfer with 3L to O2, NG tube    Transported by RN    Medicines sent: cipro infusing    Chart send with patient: Yes    Notified: daughter

## 2018-09-18 NOTE — TREATMENT PLAN
Treatment Plan  09/18/2018  4:29 PM    EGD with impression/recs below.    Impression:             - LA Grade C reflux esophagitis.  - Normal gastric body.  - Gastric ulcers with a clean ulcer base (Juan Carlos Class III).  - Gastritis.  - Duodenitis.  - One duodenal ulcer with two areas of nonbleeding visible vessel (Juan Carlos Class IIa). Ulcer is very deep. Treated with bipolar cautery. Defect occured  following treatment and unable to determine if this was transmural. Unable to close defect due to fibrosis and inflamed bowel wall. High-risk ulcer for perforation.  - Acquired duodenal stenosis.  - No specimens collected.    Recommendation:        - [facility] patient to [facility] for [facility] care.  - NPO.  - Continue present medications. IV PPI drip for 72 hours.  - Consult general surgery to have following in case she develops signs of perforation.  - Cipro (ciprofloxacin) 400 mg IV q 12 hr.  - Flagyl (metronidazole) 500 mg IV loading dose followed by 500 mg IV q 6 hr.    Lana Hsieh M.D.  Gastroenterology Fellow, PGY-V  Pager: [phone]  [facility]

## 2018-09-18 NOTE — ED NOTES
Pt soiled with urine; cleaned.  New brief and linens provided.  Pt repositioned on stretcher for comfort.  Pt remains on continuous cardiac and pulse ox monitoring with non-invasive blood pressure to cycle every 30 minutes.  VS stable; NSR noted. Bed locked in lowest position; side rails up and locked x 2; call light, bedside table, and personal belongings within reach.  Pt instructed to alert nurse for assistance and before attempting to get out of bed; verbalizes understanding.  Pt denies needs or complaints at this time.  Family members remain at bedside; will continue to monitor.

## 2018-09-18 NOTE — TRANSFER OF CARE
"Anesthesia Transfer of Care Note    Patient: Mitzy Perez    Procedure(s) Performed: Procedure(s) (LRB):  EGD (ESOPHAGOGASTRODUODENOSCOPY) (N/A)    Patient location: [facility]    Anesthesia Type: general    Transport from OR: Transported from OR on 2-3 L/min O2 by NC with adequate spontaneous ventilation    Post pain: adequate analgesia    Post assessment: no apparent anesthetic complications and tolerated procedure well    Post vital signs: stable    Level of consciousness: awake, alert and oriented    Nausea/Vomiting: no nausea/vomiting    Complications: none    Transfer of care protocol was followed      Last vitals:   Visit Vitals  BP (!) 152/53   Pulse 64   Temp 36.9 °C (98.4 °F)   Resp 16   Ht 4' 9" (1.448 m)   Wt 80.7 kg (178 lb)   SpO2 99%   Breastfeeding? No   BMI 38.52 kg/m²     "

## 2018-09-18 NOTE — ED NOTES
"Pt arrives from [facility] for evaluation of vomiting. Pt had a stroke 9/5 and was discharged to [facility] but returned to ER s/p fall where she was diagnosed with a left tib/fib fracture. Pt has cast on left leg. Pt's family reports pt suddenly sat up and vomited dark brown liquid, pt's family brought in sample, emesis appears dark brown liquid. Per family, pt vomited once and was complain of abdominal pain. Pt denies abdominal pain at this time, pt denies chest pain, SOB, fever. Pt's only current complaint is nausea. Per family, pt has no deficits post stroke. Pt is AAX4. Pt is chronically blind bilaterally. Pt is currently incontinent of BM and urine "since the fracture" reports the family.   "

## 2018-09-18 NOTE — H&P (VIEW-ONLY)
[facility]  Gastroenterology  Consult Note    Patient Name: Mitzy Perez  MRN: [id number]  Admission Date: 9/17/2018  Hospital Length of Stay: 0 days  Code Status: Prior   Attending Provider: Jeronimo Gregg MD   Consulting Provider: Lana Hsieh MD  Primary Care Physician: Marta Michael MD  Principal Problem:Acute blood loss anemia    Inpatient consult to Gastroenterology  Consult performed by: Lana Hsieh MD  Consult ordered by: Rao Vee MD        Subjective:     HPI:  85 year old female with a history of HTN and recent CVA on who GI is being consulted for a suspected GI bleed in the setting of coffee ground emesis and melena.    History provided by daughter.  She reports that for the last couple of days patient has been having issues with abdominal pain and her bowel movements.   On Thursday and Saturday daughter reported 2 tarry blood movements (look like black volcano lava). On Saturday she also began to complain of epigastric pain which was sharp/constant/non-radiating in nature. On Sunday she continued to complain of epigastric pain and had 2 more BM however daughter is unsure if they were tarry as she did not see the bowel movements.   Then yesterday she had an episode of coffee ground emesis. The family took a picture of the emesis and it was indeed coffee ground emesis.   Upon presenting to the ED she was HD stable but hemoglobin was down trending (had also been checked at the SNF and it also down trending) with a black BM documented here at [facility].   She also had a CT abdomen which showed:  --Bowel wall thickening within duodenum, proximal jejunum, and ascending colon with mild mesenteric stranding and small volume ascites.    --Findings are favored to represent a nonspecific enterocolitis which may be due to inflammatory, infectious, or ischemic etiology.    Of note patient had an ischemic stroke on 9/5 and a left ankle fracture on 9/8. Furthermore she has been on ASA  and Lovenox at her SNF with last dose yesterday (unsure if she was on a PPI). No prior EGD/colonoscopy and no prior episodes similar to this one.    Past Medical History:   Diagnosis Date    Arthritis     Cataract     Essential hypertension 9/5/2018    Macular degeneration        Past Surgical History:   Procedure Laterality Date    CATARACT EXTRACTION  2003    LEFT EYE    FIXATION OF SYNDESMOSIS OF ANKLE Left 9/11/2018    Procedure: FIXATION, SYNDESMOSIS, ANKLE;  Surgeon: Dontae Negro MD;  Location: [facility] OR [address] [place] [address];  Service: Orthopedics;  Laterality: Left;    FIXATION, SYNDESMOSIS, ANKLE Left 9/11/2018    Performed by Dontae Negro MD at [facility] OR [address] [place] [address]    OPEN REDUCTION AND INTERNAL FIXATION (ORIF) OF INJURY OF ANKLE Left 9/11/2018    Procedure: ORIF, ANKLE;  Surgeon: Dontae Negro MD;  Location: [facility] OR [address] [place] [address];  Service: Orthopedics;  Laterality: Left;    ORIF, ANKLE Left 9/11/2018    Performed by Dontae Negro MD at [facility] OR [address] [place] [address]       Review of patient's allergies indicates:  No Known Allergies  Family History     Problem Relation (Age of Onset)    Retinal detachment Sister    Thyroid disease Sister        Tobacco Use    Smoking status: Never Smoker    Smokeless tobacco: Never Used   Substance and Sexual Activity    Alcohol use: No    Drug use: Not on file    Sexual activity: Not on file     Review of Systems   Unable to perform ROS: Mental status change     Objective:     Vital Signs (Most Recent):  Temp: 97.9 °F (36.6 °C) (09/18/18 0722)  Pulse: 60 (09/18/18 0722)  Resp: 17 (09/18/18 0722)  BP: (!) 121/57 (09/18/18 0722)  SpO2: 95 % (09/18/18 0722) Vital Signs (24h Range):  Temp:  [97.5 °F (36.4 °C)-98.5 °F (36.9 °C)] 97.9 °F (36.6 °C)  Pulse:  [60-67] 60  Resp:  [17-18] 17  SpO2:  [95 %-99 %] 95 %  BP: (120-169)/(44-72) 121/57     Weight: 80.7 kg (178 lb) (09/17/18 2058)  Body mass index is 38.52 kg/m².      Intake/Output Summary (Last 24 hours) at 9/18/2018 0965  Last data  filed at 9/18/2018 0536  Gross per 24 hour   Intake 1100 ml   Output --   Net 1100 ml       Lines/Drains/Airways     Epidural Line                 Perineural Analgesia/Anesthesia Assessment (using dermatomes) Epidural 09/11/18 0849 7 days          Peripheral Intravenous Line                 Peripheral IV - Single Lumen 09/17/18 2232 Anterior;Left Antecubital less than 1 day         Peripheral IV - Single Lumen 09/17/18 2240 Anterior;Proximal;Right Forearm less than 1 day                Physical Exam   Constitutional: No distress.   HENT:   Head: Normocephalic and atraumatic.   Eyes: No scleral icterus.   Cardiovascular: Normal rate and regular rhythm.   Pulmonary/Chest: Effort normal and breath sounds normal.   Abdominal: Soft. Bowel sounds are normal. She exhibits no distension and no mass. There is no tenderness. There is no rebound and no guarding. No hernia.   Musculoskeletal: She exhibits edema.   Left lower extremity with a bandage   Neurological:   Patient opens her eyes to painful stimuli which family states is her usual, she has been having waxing and waning mentation.   Skin: She is not diaphoretic.       Significant Labs:  CBC:   Recent Labs   Lab  09/17/18 2146 09/18/18   0344   WBC  12.15  12.36   HGB  8.5*  5.7*   HCT  27.4*  18.6*   PLT  174  179     CMP:   Recent Labs   Lab  09/17/18 2146 09/18/18   0344   GLU  94  78   CALCIUM  8.8  7.9*   ALBUMIN  1.8*   --    PROT  5.5*   --    NA  141  143   K  4.8  4.8   CO2  19*  21*   CL  115*  117*   BUN  71*  63*   CREATININE  1.2  1.1   ALKPHOS  159*   --    ALT  21   --    AST  49*   --    BILITOT  0.4   --      Coagulation:   Recent Labs   Lab  09/17/18 2146   INR  1.1   APTT  <21.0       Significant Imaging:  Imaging results within the past 24 hours have been reviewed.    Assessment/Plan:     Upper GI bleed    85 year old female with a history of HTN and recent CVA on who GI is being consulted for a suspected GI bleed in the setting of coffee  ground emesis and melena. Based on her complaints of coffee ground emesis with black stool in the setting of down trending Hgb we would like to proceed with an EGD today to evaluate for an upper source of bleeding (ddx esophagitis vs gastritis vs PUD).    Recommendations:  -Keep NPO  -Maintain IV access with 2 large bore IVs  -Intravascular resuscitation/support with IVFs   -Serial H/H's and pRBCs transfusion as indicated  -Protonix 80mg IV bolus x 1 then gtt at 8mg/hr  -Discontinue all NSAIDs and Heparin products  -Please correct any coagulopathy with platelets and FFP to a goal of platelets >50K and INR <2.0  -Plan for EGD today  -Please promptly notify GI team if there is significant change in patient's clinical status              Thank you for your consult. I will follow-up with patient. Please contact us if you have any additional questions.    Lana Hsieh M.D.  Gastroenterology Fellow, PGY-V  Pager: [phone]  [facility]

## 2018-09-18 NOTE — HPI
85 year old female with a history of HTN and recent CVA on who GI is being consulted for a suspected GI bleed in the setting of coffee ground emesis and melena.    History provided by daughter.  She reports that for the last couple of days patient has been having issues with abdominal pain and her bowel movements.   On Thursday and Saturday daughter reported 2 tarry blood movements (look like black volcano lava). On Saturday she also began to complain of epigastric pain which was sharp/constant/non-radiating in nature. On Sunday she continued to complain of epigastric pain and had 2 more BM however daughter is unsure if they were tarry as she did not see the bowel movements.   Then yesterday she had an episode of coffee ground emesis. The family took a picture of the emesis and it was indeed coffee ground emesis.   Upon presenting to the ED she was HD stable but hemoglobin was down trending (had also been checked at the SNF and it also down trending) with a black BM documented here at [facility].   She also had a CT abdomen which showed:  --Bowel wall thickening within duodenum, proximal jejunum, and ascending colon with mild mesenteric stranding and small volume ascites.    --Findings are favored to represent a nonspecific enterocolitis which may be due to inflammatory, infectious, or ischemic etiology.    Of note patient had an ischemic stroke on 9/5 and a left ankle fracture on 9/8. Furthermore she has been on ASA and Lovenox at her SNF with last dose yesterday (unsure if she was on a PPI). No prior EGD/colonoscopy and no prior episodes similar to this one.

## 2018-09-18 NOTE — CONSULTS
[facility]  Gastroenterology  Consult Note    Patient Name: Mitzy Perez  MRN: [id number]  Admission Date: 9/17/2018  Hospital Length of Stay: 0 days  Code Status: Prior   Attending Provider: Jeronimo Gregg MD   Consulting Provider: Lana Hsieh MD  Primary Care Physician: Marta Michael MD  Principal Problem:Acute blood loss anemia    Inpatient consult to Gastroenterology  Consult performed by: Lana Hsieh MD  Consult ordered by: Rao Vee MD        Subjective:     HPI:  85 year old female with a history of HTN and recent CVA on who GI is being consulted for a suspected GI bleed in the setting of coffee ground emesis and melena.    History provided by daughter.  She reports that for the last couple of days patient has been having issues with abdominal pain and her bowel movements.   On Thursday and Saturday daughter reported 2 tarry blood movements (look like black volcano lava). On Saturday she also began to complain of epigastric pain which was sharp/constant/non-radiating in nature. On Sunday she continued to complain of epigastric pain and had 2 more BM however daughter is unsure if they were tarry as she did not see the bowel movements.   Then yesterday she had an episode of coffee ground emesis. The family took a picture of the emesis and it was indeed coffee ground emesis.   Upon presenting to the ED she was HD stable but hemoglobin was down trending (had also been checked at the SNF and it also down trending) with a black BM [place] [facility]   She also had a CT abdomen which showed:  --Bowel wall thickening within duodenum, proximal jejunum, and ascending colon with mild mesenteric stranding and small volume ascites.    --Findings are favored to represent a nonspecific enterocolitis which may be due to inflammatory, infectious, or ischemic etiology.    Of note patient had an ischemic stroke on 9/5 and a left ankle fracture on 9/8. Furthermore she has been on ASA  and Lovenox at her SNF with last dose yesterday (unsure if she was on a PPI). No prior EGD/colonoscopy and no prior episodes similar to this one.    Past Medical History:   Diagnosis Date    Arthritis     Cataract     Essential hypertension 9/5/2018    Macular degeneration        Past Surgical History:   Procedure Laterality Date    CATARACT EXTRACTION  2003    LEFT EYE    FIXATION OF SYNDESMOSIS OF ANKLE Left 9/11/2018    Procedure: FIXATION, SYNDESMOSIS, ANKLE;  Surgeon: Dontae Negro MD;  Location: [facility] OR [address] [place] [address];  Service: Orthopedics;  Laterality: Left;    FIXATION, SYNDESMOSIS, ANKLE Left 9/11/2018    Performed by Dontae Negro MD at [facility] OR [address] [place] [address]    OPEN REDUCTION AND INTERNAL FIXATION (ORIF) OF INJURY OF ANKLE Left 9/11/2018    Procedure: ORIF, ANKLE;  Surgeon: Dontae Negro MD;  Location: [facility] OR [address] [place] [address];  Service: Orthopedics;  Laterality: Left;    ORIF, ANKLE Left 9/11/2018    Performed by Dontae Negro MD at [facility] OR [address] [place] [address]       Review of patient's allergies indicates:  No Known Allergies  Family History     Problem Relation (Age of Onset)    Retinal detachment Sister    Thyroid disease Sister        Tobacco Use    Smoking status: Never Smoker    Smokeless tobacco: Never Used   Substance and Sexual Activity    Alcohol use: No    Drug use: Not on file    Sexual activity: Not on file     Review of Systems   Unable to perform ROS: Mental status change     Objective:     Vital Signs (Most Recent):  Temp: 97.9 °F (36.6 °C) (09/18/18 0722)  Pulse: 60 (09/18/18 0722)  Resp: 17 (09/18/18 0722)  BP: (!) 121/57 (09/18/18 0722)  SpO2: 95 % (09/18/18 0722) Vital Signs (24h Range):  Temp:  [97.5 °F (36.4 °C)-98.5 °F (36.9 °C)] 97.9 °F (36.6 °C)  Pulse:  [60-67] 60  Resp:  [17-18] 17  SpO2:  [95 %-99 %] 95 %  BP: (120-169)/(44-72) 121/57     Weight: 80.7 kg (178 lb) (09/17/18 2058)  Body mass index is 38.52 kg/m².      Intake/Output Summary (Last 24 hours) at 9/18/2018 0972  Last data  filed at 9/18/2018 0536  Gross per 24 hour   Intake 1100 ml   Output --   Net 1100 ml       Lines/Drains/Airways     Epidural Line                 Perineural Analgesia/Anesthesia Assessment (using dermatomes) Epidural 09/11/18 0849 7 days          Peripheral Intravenous Line                 Peripheral IV - Single Lumen 09/17/18 2232 Anterior;Left Antecubital less than 1 day         Peripheral IV - Single Lumen 09/17/18 2240 Anterior;Proximal;Right Forearm less than 1 day                Physical Exam   Constitutional: No distress.   HENT:   Head: Normocephalic and atraumatic.   Eyes: No scleral icterus.   Cardiovascular: Normal rate and regular rhythm.   Pulmonary/Chest: Effort normal and breath sounds normal.   Abdominal: Soft. Bowel sounds are normal. She exhibits no distension and no mass. There is no tenderness. There is no rebound and no guarding. No hernia.   Musculoskeletal: She exhibits edema.   Left lower extremity with a bandage   Neurological:   Patient opens her eyes to painful stimuli which family states is her usual, she has been having waxing and waning mentation.   Skin: She is not diaphoretic.       Significant Labs:  CBC:   Recent Labs   Lab  09/17/18 2146 09/18/18   0344   WBC  12.15  12.36   HGB  8.5*  5.7*   HCT  27.4*  18.6*   PLT  174  179     CMP:   Recent Labs   Lab  09/17/18 2146 09/18/18   0344   GLU  94  78   CALCIUM  8.8  7.9*   ALBUMIN  1.8*   --    PROT  5.5*   --    NA  141  143   K  4.8  4.8   CO2  19*  21*   CL  115*  117*   BUN  71*  63*   CREATININE  1.2  1.1   ALKPHOS  159*   --    ALT  21   --    AST  49*   --    BILITOT  0.4   --      Coagulation:   Recent Labs   Lab  09/17/18 2146   INR  1.1   APTT  <21.0       Significant Imaging:  Imaging results within the past 24 hours have been reviewed.    Assessment/Plan:     Upper GI bleed    85 year old female with a history of HTN and recent CVA on who GI is being consulted for a suspected GI bleed in the setting of coffee  ground emesis and melena. Based on her complaints of coffee ground emesis with black stool in the setting of down trending Hgb we would like to proceed with an EGD today to evaluate for an upper source of bleeding (ddx esophagitis vs gastritis vs PUD).    Recommendations:  -Keep NPO  -Maintain IV access with 2 large bore IVs  -Intravascular resuscitation/support with IVFs   -Serial H/H's and pRBCs transfusion as indicated  -Protonix 80mg IV bolus x 1 then gtt at 8mg/hr  -Discontinue all NSAIDs and Heparin products  -Please correct any coagulopathy with platelets and FFP to a goal of platelets >50K and INR <2.0  -Plan for EGD today  -Please promptly notify GI team if there is significant change in patient's clinical status              Thank you for your consult. I will follow-up with patient. Please contact us if you have any additional questions.    Lana Hsieh M.D.  Gastroenterology Fellow, PGY-V  Pager: [phone]  [facility]

## 2018-09-18 NOTE — CONSULTS
[facility]  Attending: Edward  Resident: Thompson   CC: Bleeding duodenal ulcer/ rule out perforation    HPI: Mitzy Perez is a pleasant 85 y.o. woman, who is currently in a nursing home after a stroke 1 mo ago, s/p L ankle fracture repair last week, who is admitted to the medicine team for hematemesis.    She was taken to endoscopy today for UGI bleed.  Hb had dropped 2.5 points yesterday, and responded well to 2 PRBC.    A large ulcer was noted, and stopped with cautery.  We are asked to follow along in case of full thickness defect to the duodenum.    In the recovery room, she has some abdominal pain currently, worse in the RUQ.    Past Medical History:   Diagnosis Date    Arthritis     Cataract     Essential hypertension 9/5/2018    Macular degeneration        Past Surgical History:   Procedure Laterality Date    CATARACT EXTRACTION  2003    LEFT EYE    FIXATION OF SYNDESMOSIS OF ANKLE Left 9/11/2018    Procedure: FIXATION, SYNDESMOSIS, ANKLE;  Surgeon: Dontae Negro MD;  Location: [facility] OR [address] [place] [address];  Service: Orthopedics;  Laterality: Left;    FIXATION, SYNDESMOSIS, ANKLE Left 9/11/2018    Performed by Dontae Negro MD at [facility] OR [address] [place] [address]    OPEN REDUCTION AND INTERNAL FIXATION (ORIF) OF INJURY OF ANKLE Left 9/11/2018    Procedure: ORIF, ANKLE;  Surgeon: Dontae Negro MD;  Location: [facility] OR [address] [place] [address];  Service: Orthopedics;  Laterality: Left;    ORIF, ANKLE Left 9/11/2018    Performed by D[facility]ae Negro MD at [facility] OR [address] [place] [address]       Family History   Problem Relation Age of Onset    Retinal detachment Sister     Thyroid disease Sister     Amblyopia Neg Hx     Blindness Neg Hx     Cancer Neg Hx     Cataracts Neg Hx     Diabetes Neg Hx     Glaucoma Neg Hx     Hypertension Neg Hx     Macular degeneration Neg Hx     Strabismus Neg Hx     Stroke Neg Hx        Social History     Socioeconomic History    Marital status:      Spouse name: Not on file    Number of children: Not on  file    Years of education: Not on file    Highest education level: Not on file   Social Needs    Financial resource strain: Not on file    Food insecurity - worry: Not on file    Food insecurity - inability: Not on file    Transportation needs - medical: Not on file    Transportation needs - non-medical: Not on file   Occupational History    Not on file   Tobacco Use    Smoking status: Never Smoker    Smokeless tobacco: Never Used   Substance and Sexual Activity    Alcohol use: No    Drug use: Not on file    Sexual activity: Not on file   Other Topics Concern    Not on file   Social History Narrative    Not on file       No current facility-administered medications on file prior to encounter.      Current Outpatient Medications on File Prior to Encounter   Medication Sig Dispense Refill    amLODIPine (NORVASC) 5 MG tablet Take 1 tablet (5 mg total) by mouth once daily. 30 tablet 11    aspirin (ECOTRIN) 81 MG EC tablet Take 81 mg by mouth every evening.       atorvastatin (LIPITOR) 40 MG tablet Take 1 tablet (40 mg total) by mouth once daily. 90 tablet 3    carvedilol (COREG) 6.25 MG tablet Take 1 tablet (6.25 mg total) by mouth 2 (two) times daily. 60 tablet 11    lisinopril (PRINIVIL,ZESTRIL) 20 MG tablet Take 1 tablet (20 mg total) by mouth once daily. 90 tablet 3    BETA-CAROTENE,A, W-C & E/MIN (ICAPS ORAL) Take 1 tablet by mouth once daily.      fish oil-omega-3 fatty acids 300-1,000 mg capsule Take 1 capsule by mouth once daily.       HYDROcodone-acetaminophen (NORCO) 5-325 mg per tablet Take 1 tablet by mouth every 6 (six) hours as needed for Pain. 30 tablet 0    traMADol (ULTRAM) 50 mg tablet Take 1 tablet (50 mg total) by mouth every 6 (six) hours as needed for Pain (unrelieved by tylenol).         Review of patient's allergies indicates:  No Known Allergies    ROS: Constitutional: no fever or chills, pain controlled   Respiratory: no cough or shortness of breath   Cardiovascular: no  chest pain or palpitations   Genitourinary: no hematuria or dysuria   Hematologic/Lymphatic: no easy bruising or lymphadenopathy   Musculoskeletal: no arthralgias or myalgias   Neurological: no seizures or tremors     Phys:  Vitals:    09/18/18 1337 09/18/18 1412 09/18/18 1546 09/18/18 1600   BP: (!) 151/69 (!) 152/53 (!) 142/62 (!) 174/70   BP Location:   Left arm Left arm   Patient Position:   Lying Lying   Pulse: 64 64 65 63   Resp:  16 (!) 21 (!) 21   Temp:  98.4 °F (36.9 °C) 98.1 °F (36.7 °C)    TempSrc:   Temporal    SpO2: 97% 99% 99% 100%   Weight:       Height:          Phys:   Gen: NAD   HEENT: NCAT, trachea midline  Pulm: Unlabored  Abd: Soft, moderate ttp RUQ. No rebound, guarding.   Extremities: no cyanosis or edema, or clubbing  Skin: Skin color, texture, turgor normal. No rashes or lesions     A/P 85 year old woman, with bleeding duodenal ulcer s/p endoscopic therapy, concern for perforation    Possible perforation:  Agree with NPO, IVF, IV abx  Asked nurses to place NGT to decompress this area  If perforation is present, it would likely be posterior, and there would be a large potential for non operative management  X ray flat and erect now- to rule out free air    UGI bleed:  Agree with protonix, consider carafate  Serial CBC  Large bore IV access  No coagulopathy is present    Will follow with you    Tommy Mackay MD  General Surgery, PGY-5  [phone]

## 2018-09-18 NOTE — CARE UPDATE
Patient's repeat hemoglobin of 5.7 from 8.5 earlier today. Patient already consented for blood. Will transfuse 2 units. Hemodynamically stable at this time.       Rao Vee MD (PGY2)  Internal Medicine

## 2018-09-18 NOTE — PROGRESS NOTES
Patient began to have severe abdominal pain while Dr. Mackay was in slot with her. Received orders for 0.5 mg dilaudid, now, stat NG tube placement, and stat abdominal x-rays.  Dilaudid given.  Dr. Pradhan to bedside.  Updated him of above.    16 [place] NG tube placed by Sravani Hunter RN. Patient tolerated procedure decently, but her BP did begin to rise during placement.  Minimal clear secretions obtained during placement of NG tube.  Patient said still in 8/10 pain p NG placement.  Gave another 0.5 mg dilaudid, per Dr. Mackay's order.     3904 vitals not obtained because patient being unhooked for transport to x-ray for abdominal x-ray and confirmation of tube placement.

## 2018-09-18 NOTE — ASSESSMENT & PLAN NOTE
85 year old female with a history of HTN and recent CVA on who GI is being consulted for a suspected GI bleed in the setting of coffee ground emesis and melena. Based on her complaints of coffee ground emesis with black stool in the setting of down trending Hgb we would like to proceed with an EGD today to evaluate for an upper source of bleeding (ddx esophagitis vs gastritis vs PUD).    Recommendations:  -Keep NPO  -Maintain IV access with 2 large bore IVs  -Intravascular resuscitation/support with IVFs   -Serial H/H's and pRBCs transfusion as indicated  -Protonix 80mg IV bolus x 1 then gtt at 8mg/hr  -Discontinue all NSAIDs and Heparin products  -Please correct any coagulopathy with platelets and FFP to a goal of platelets >50K and INR <2.0  -Plan for EGD today  -Please promptly notify GI team if there is significant change in patient's clinical status

## 2018-09-18 NOTE — SUBJECTIVE & OBJECTIVE
Past Medical History:   Diagnosis Date    Arthritis     Cataract     Essential hypertension 9/5/2018    Macular degeneration        Past Surgical History:   Procedure Laterality Date    CATARACT EXTRACTION  2003    LEFT EYE    FIXATION OF SYNDESMOSIS OF ANKLE Left 9/11/2018    Procedure: FIXATION, SYNDESMOSIS, ANKLE;  Surgeon: Dontae Negro MD;  Location: [address];  Service: Orthopedics;  Laterality: Left;    FIXATION, SYNDESMOSIS, ANKLE Left 9/11/2018    Performed by Dontae Negro MD at [facility] OR [address] [place] [address]    OPEN REDUCTION AND INTERNAL FIXATION (ORIF) OF INJURY OF ANKLE Left 9/11/2018    Procedure: ORIF, ANKLE;  Surgeon: Dontae Negro MD;  Location: [facility] OR [address] [place] [address];  Service: Orthopedics;  Laterality: Left;    ORIF, ANKLE Left 9/11/2018    Performed by Dontae Negro MD at [facility] OR [address] [place] [address]       Review of patient's allergies indicates:  No Known Allergies    Current Facility-Administered Medications on File Prior to Encounter   Medication    [DISCONTINUED] 0.9%  NaCl infusion    [DISCONTINUED] acetaminophen tablet    [DISCONTINUED] aspirin chewable tablet    [DISCONTINUED] carvedilol tablet    [DISCONTINUED] GENERIC EXTERNAL MEDICATION    [DISCONTINUED] GENERIC EXTERNAL MEDICATION    [DISCONTINUED] lisinopril tablet    [DISCONTINUED] lisinopril tablet     Current Outpatient Medications on File Prior to Encounter   Medication Sig    amLODIPine (NORVASC) 5 MG tablet Take 1 tablet (5 mg total) by mouth once daily.    aspirin (ECOTRIN) 81 MG EC tablet Take 81 mg by mouth every evening.     atorvastatin (LIPITOR) 40 MG tablet Take 1 tablet (40 mg total) by mouth once daily.    carvedilol (COREG) 6.25 MG tablet Take 1 tablet (6.25 mg total) by mouth 2 (two) times daily.    lisinopril (PRINIVIL,ZESTRIL) 20 MG tablet Take 1 tablet (20 mg total) by mouth once daily.    BETA-CAROTENE,A, W-C & E/MIN (ICAPS ORAL) Take 1 tablet by mouth once daily.    fish oil-omega-3 fatty acids  300-1,000 mg capsule Take 1 capsule by mouth once daily.     HYDROcodone-acetaminophen (NORCO) 5-325 mg per tablet Take 1 tablet by mouth every 6 (six) hours as needed for Pain.    traMADol (ULTRAM) 50 mg tablet Take 1 tablet (50 mg total) by mouth every 6 (six) hours as needed for Pain (unrelieved by tylenol).     Family History     Problem Relation (Age of Onset)    Retinal detachment Sister    Thyroid disease Sister        Tobacco Use    Smoking status: Never Smoker    Smokeless tobacco: Never Used   Substance and Sexual Activity    Alcohol use: No    Drug use: Not on file    Sexual activity: Not on file     Review of Systems   Constitutional: Positive for fatigue. Negative for activity change, chills and fever.   HENT: Negative for congestion, postnasal drip, rhinorrhea and sinus pressure.    Eyes: Negative for discharge, redness and visual disturbance.   Respiratory: Negative for cough and shortness of breath.    Cardiovascular: Negative for chest pain and palpitations.   Gastrointestinal: Positive for abdominal distention, abdominal pain, blood in stool and constipation. Negative for diarrhea, nausea and vomiting.   Endocrine: Negative for cold intolerance and heat intolerance.   Genitourinary: Negative for dysuria.   Musculoskeletal: Negative for arthralgias and back pain.   Skin: Negative for color change and pallor.   Neurological: Positive for weakness. Negative for dizziness, seizures, speech difficulty and headaches.   Psychiatric/Behavioral: Negative for agitation, sleep disturbance and suicidal ideas. The patient is not nervous/anxious.      Objective:     Vital Signs (Most Recent):  Temp: 98.5 °F (36.9 °C) (09/17/18 2058)  Pulse: 62 (09/18/18 0224)  Resp: 18 (09/18/18 0224)  BP: (!) 169/72 (09/18/18 0224)  SpO2: 99 % (09/18/18 0224) Vital Signs (24h Range):  Temp:  [98.5 °F (36.9 °C)] 98.5 °F (36.9 °C)  Pulse:  [60-67] 62  Resp:  [17-18] 18  SpO2:  [96 %-99 %] 99 %  BP: (139-169)/(44-72) 169/72      Weight: 80.7 kg (178 lb)  Body mass index is 38.52 kg/m².    Physical Exam   Constitutional: She is oriented to person, place, and time. She appears well-developed and well-nourished.   Patient is lethargic   Eyes: Pupils are equal, round, and reactive to light.   Patient is blind   Neck: Normal range of motion. Neck supple.   Cardiovascular: Normal rate, regular rhythm and normal heart sounds.   No murmur heard.  Pulmonary/Chest: Effort normal and breath sounds normal. No stridor. No respiratory distress. She has no wheezes.   Abdominal: Soft. Bowel sounds are normal. She exhibits distension. There is tenderness (mid epigastric region). There is no guarding.   Musculoskeletal: Normal range of motion. She exhibits no edema or deformity.   Neurological: She is alert and oriented to person, place, and time. Coordination normal.   Skin: Skin is warm and dry. Capillary refill takes less than 2 seconds. No erythema.         CRANIAL NERVES     CN III, IV, VI   Pupils are equal, round, and reactive to light.     Significant Labs:   BMP:   Recent Labs   Lab  09/17/18   2146   GLU  94   NA  141   K  4.8   CL  115*   CO2  19*   BUN  71*   CREATININE  1.2   CALCIUM  8.8     CBC:   Recent Labs   Lab  09/17/18   2146   WBC  12.15   HGB  8.5*   HCT  27.4*   PLT  174       Significant Imaging: CT: I have reviewed all pertinent results/findings within the past 24 hours and my personal findings are:  Bowel wall thickening within duodenum, proximal jejunum, and ascending colon with mild mesenteric stranding and small volume ascites.  Findings are favored to represent a nonspecific enterocolitis

## 2018-09-18 NOTE — NURSING TRANSFER
Nursing Transfer Note      9/18/2018     Transfer To: [facility]    Transfer via stretcher    Transfer with 3L to O2, NG tube (but appears dislodged)    Transported by RN    Medicines sent: cipro    Chart send with patient: Yes    Notified: RN aware

## 2018-09-19 PROBLEM — A41.9 SEPSIS: Status: ACTIVE | Noted: 2018-01-01

## 2018-09-19 PROBLEM — Z75.8 DISCHARGE PLANNING ISSUES: Status: ACTIVE | Noted: 2018-01-01

## 2018-09-19 PROBLEM — K27.4 PEPTIC ULCER DISEASE WITH HEMORRHAGE: Status: ACTIVE | Noted: 2018-01-01

## 2018-09-19 PROBLEM — N17.9 ACUTE RENAL FAILURE: Status: ACTIVE | Noted: 2018-01-01

## 2018-09-19 PROBLEM — Z86.73 H/O ISCHEMIC MULTIFOCAL MULTIPLE VASCULAR TERRITORIES STROKE: Status: ACTIVE | Noted: 2018-01-01

## 2018-09-19 NOTE — PT/OT/SLP EVAL
Occupational Therapy   Evaluation    Name: Mitzy Perez  MRN: [id number]  Admitting Diagnosis:  Acute blood loss anemia 1 Day Post-Op    Recommendations:     Discharge Recommendations: nursing facility, skilled  Discharge Equipment Recommendations:  (TBD pending progress in therapy)  Barriers to discharge:  None    History:     Occupational Profile:  Living Environment: Patient lives in a H with daughter; no steps to enter.   Previous level of function: I in alll ADLs. Patient no longer drives.  Roles and Routines: None stated  Equipment Used at Home:  none  Assistance upon Discharge: Daughter can provide 24/hr Assistance    Past Medical History:   Diagnosis Date    Arthritis     Cataract     Essential hypertension 9/5/2018    Macular degeneration        Past Surgical History:   Procedure Laterality Date    CATARACT EXTRACTION  2003    LEFT EYE    FIXATION OF SYNDESMOSIS OF ANKLE Left 9/11/2018    Procedure: FIXATION, SYNDESMOSIS, ANKLE;  Surgeon: Dontae Negro MD;  Location: [facility] OR [address] [place] [address];  Service: Orthopedics;  Laterality: Left;    FIXATION, SYNDESMOSIS, ANKLE Left 9/11/2018    Performed by Dontae Negro MD at [facility] OR [address] [place] [address]    OPEN REDUCTION AND INTERNAL FIXATION (ORIF) OF INJURY OF ANKLE Left 9/11/2018    Procedure: ORIF, ANKLE;  Surgeon: Dontae Negro MD;  Location: [facility] OR [address] [place] [address];  Service: Orthopedics;  Laterality: Left;    ORIF, ANKLE Left 9/11/2018    Performed by Dontae Negro MD at [facility] OR [address] [place] [address]       Subjective     Chief Complaint: Pain and weakness  Patient/Family Comments/goals: Patient agreeable to OT session.     Pain/Comfort:  · Location 1: abdomen  · Pain Addressed 1: Pre-medicate for activity, Other (see comments), Reposition(JUDSON Cerrato administered pain meds )    Patients cultural, spiritual, Scientologist conflicts given the current situation: N/A    Objective:     Communicated with: JUDSON Cerrato prior to session.  Patient found all lines intact and call button in  reach and telemetry, peripheral IV, NG tube upon OT entry to room with granddaughter present.     General Precautions: Standard, fall, other (see comments)(NWB LLE)   Orthopedic Precautions:LLE non weight bearing   Braces: N/A     Occupational Performance    Bed Mobility:    · Patient completed Rolling/Turning to Right with maximal assistance  · Patient completed Supine to Sit with maximal assistance    Functional Mobility/Transfers:  · Patient completed Sit <> Stand Transfer with maximal assistance  with  no assistive device and therapist facilitation   · Patient completed Bed <> Chair Transfer using Stand Pivot technique with maximal assistance with no assistive device and therapist facilitation  · Functional Mobility: No functional mobility assessed this date 2/2 patient's lethargic state and decreased orientation    Activities of Daily Living:  · Grooming: moderate assistance for hand over hand faciliation for thoroughness of task    Cognitive/Visual Perceptual:  Cognitive/Psychosocial Skills:     -       Oriented to: Person   -       Follows Commands/attention:Follows one-step commands and inconsistently   -       Communication: clear/fluent  -       Memory: Patient poor historian this date  -       Safety awareness/insight to disability: impaired   -       Mood/Affect/Coping skills/emotional control: Lethargic  Visual/Perceptual:      -Impaired (Mac.Degeneration)     Physical Exam:  Balance: -       dynamic sitting with BUE support- SPV  Skin integrity: Visible skin intact  Edema:  Mild LLE  Sensation:    -       Intact BUE Light touch  Upper Extremity Range of Motion:     -       Right Upper Extremity: WNL  -       Left Upper Extremity: WNL   Upper Extremity Strength:    -       Right Upper Extremity: WNL 3  -       Left Upper Extremity: WNL 3   Strength:    -       Right Upper Extremity: WNL 3  -       Left Upper Extremity: WNL 3  Fine Motor Coordination:    -       Intact    AMPAC 6 Click ADL:  AMPAC  "Total Score: 13    Treatment & Education:  Patient educated on the following:  - OT POC  - Importance of OOB activity  - Self care independence  Education:    Patient left up in chair with all lines intact and call button in reach    Assessment:     Mitzy Perez is a 85 y.o. female with a medical diagnosis of Acute blood loss anemia. Patient's activity tolerance this date was limited 2/2 patient's weakness and pain. Patient's current state is affecting her ability to safely complete her ADLs. Patient will continue to benefit from skilled OT to increase her functional independence.      She presents with the following performance deficits affecting function: weakness, impaired endurance, impaired self care skills, visual deficits, decreased lower extremity function, orthopedic precautions, impaired functional mobilty, impaired cognition, decreased safety awareness, impaired coordination, gait instability, pain, impaired balance.      Rehab Prognosis: Fair; patient would benefit from acute skilled OT services to address these deficits and reach maximum level of function.         Clinical Decision Makin.  OT Mod:  "Pt evaluation falls under moderate complexity for evaluation coding due to identification of 3-5 performance deficits noted as stated above. Eval required Min/Mod assistance to complete on this date and detailed assessment(s) were utilized. Moreover, an expanded review of history and occupational profile obtained with additional review of cognitive, physical and psychosocial hx."     Plan:     Patient to be seen 4 x/week to address the above listed problems via self-care/home management, therapeutic activities, therapeutic exercises  · Plan of Care Expires: 10/18/18  · Plan of Care Reviewed with: patient    This Plan of care has been discussed with the patient who was involved in its development and understands and is in agreement with the identified goals and treatment plan    GOALS: "   Multidisciplinary Problems     Occupational Therapy Goals        Problem: Occupational Therapy Goal    Goal Priority Disciplines Outcome Interventions   Occupational Therapy Goal     OT, PT/OT Ongoing (interventions implemented as appropriate)    Description:  Goals to be met by: 9/26/18    Patient will increase functional independence with ADLs by performing:    UE Dressing with Minimal Assistance.  LE Dressing with Minimal Assistance.  Grooming while standing with Minimal Assistance.  Toileting from bedside commode with Minimal Assistance for hygiene and clothing management.   Sitting at edge of bed x10 minutes with Contact Guard Assistance while completing a functional activity.   Supine to sit with Minimal Assistance.  Stand pivot transfers with Minimal Assistance.  Toilet transfer to bedside commode with Minimal Assistance.                      Time Tracking:     OT Date of Treatment: 09/19/18  OT Start Time: 1130  OT Stop Time: 1142  OT Total Time (min): 12 min    Billable Minutes:Evaluation 12    Jaki Watt OT  9/19/2018

## 2018-09-19 NOTE — MEDICAL/APP STUDENT
Progress Note  Hospital Medicine    Patient Name: Mitzy Perez  YOB: 1933    Admit Date: 9/17/2018                     LOS: 1    SUBJECTIVE:     Reason for Admission:  Acute blood loss anemia  See H&P for detailed presentating history and ROS.      Interval history:   Ms. Perez was sleeping in bed this morning and overnight history was obtained from her grand-daughter at bedside.  She reported that the patient slept through the night apart from waking up to complain of abdominal pain and discomfort that was relieved with dilaudid.    An EsophagoGastroDudenoscopy was performed to investigate for cause of GI bleeding.     Impression from Dr. Hayder Pradhan:              - LA Grade C reflux esophagitis.       - Normal gastric body.        - Gastric ulcers with a clean ulcer base (Juan Carlos Class III).      - Gastritis.     - Duodenitis.     - One duodenal ulcer with two areas of nonbleeding visible vessel (Juan Carlos Class IIa). Ulcer is very deep. Treated with   bipolar cautery. Defect occured following treatment and unable to determine if this  was transmural. Unable to close   defect due to fibrosis and inflamed bowel wall. High-risk ulcer for perforation.     - Acquired duodenal stenosis.    - No specimens collected.      OBJECTIVE:     Vital Signs Range (Last 24H):  Temp:  [97.3 °F (36.3 °C)-98.8 °F (37.1 °C)]   Pulse:  [59-68]   Resp:  [16-28]   BP: (114-185)/()   SpO2:  [94 %-100 %] Body mass index is 38.52 kg/m².  Wt Readings from Last 1 Encounters:   09/17/18 2058 80.7 kg (178 lb)       I & O (Last 24H):    Intake/Output Summary (Last 24 hours) at 9/19/2018 0759  Last data filed at 9/19/2018 0200  Gross per 24 hour   Intake 1100 ml   Output 0 ml   Net 1100 ml       Physical Exam:  Physical Exam   Constitutional: She appears listless. She is sleeping. She appears ill.   Cardiovascular: Normal rate, regular rhythm and normal heart sounds.   Pulmonary/Chest: Breath sounds normal. No  accessory muscle usage (Belly Breathing ).   Abdominal: She exhibits distension. Bowel sounds are absent. There is generalized tenderness.   Neurological: She appears listless. She is disoriented.   Pt is sleeping and only aroused by speaking loudly.  She will respond to questions but does not know the day/month/year.  She is able to say her name when prompted.           Diagnostic Results:  Lab Results   Component Value Date    WBC 28.19 (H) 09/19/2018    HGB 9.9 (L) 09/19/2018    HCT 30.7 (L) 09/19/2018    MCV 96 09/19/2018     09/19/2018     Recent Labs   Lab  09/19/18   0447   GLU  94   NA  142   K  5.1   CL  114*   CO2  21*   BUN  74*   CREATININE  2.0*   CALCIUM  8.5*     Lab Results   Component Value Date    INR 1.1 09/17/2018    INR 1.1 09/08/2018    INR 1.1 09/06/2018     Lab Results   Component Value Date    HGBA1C 5.0 09/08/2018     No results for input(s): POCTGLUCOSE in the last 72 hours.    ASSESSMENT/PLAN:     Active Hospital Problems    Diagnosis  POA    *Acute blood loss anemia [D62]  Yes    Upper GI bleed [K92.2]  Yes    Obesities, morbid [E66.01]  Yes    Closed trimalleolar fracture of left ankle with routine healing [S82.852D]  Not Applicable    Mixed hyperlipidemia [E78.2]  Yes    Essential hypertension [I10]  Yes    Acute ischemic multifocal multiple vascular territories stroke [I63.8]  Yes      Resolved Hospital Problems   No resolved problems to display.       PROBLEMS ADDRESSED TODAY     1. Possible Duodenal Perforation  - Abdominal Xray flat and erect  - Ciprofloxacin 400mg and Metronidazole 500mg   - Consult general surgery   - NG Tube on intermittent suction   - Keep Nil by Mouth    2. Sepsis  - Serial physical exams to monitor condition   - CBC     3. Acute Kidney Injury  - Monitor urine output   - BMP    DISCHARGE  - Unknown  - Status dependent on presence of possible perforation         TIME SPENT: I spent 20 minutes evaluating, treating, counseling, and coordinating care  for the patient.    Signing Physician:  Reid Ramon

## 2018-09-19 NOTE — PROGRESS NOTES
Pharmacist Renal Dose Adjustment Note    Mitzy Perez is a 85 y.o. female being treated with the medication Ciprofloxacin     Patient Data:    Vital Signs (Most Recent):  Temp: 97.7 °F (36.5 °C) (09/19/18 0830)  Pulse: 61 (09/19/18 0830)  Resp: 16 (09/19/18 0830)  BP: (!) 141/58 (09/19/18 0830)  SpO2: 95 % (09/19/18 0830)   Vital Signs (72h Range):  Temp:  [97.3 °F (36.3 °C)-98.8 °F (37.1 °C)]   Pulse:  [59-68]   Resp:  [16-28]   BP: (114-185)/()   SpO2:  [94 %-100 %]      Recent Labs   Lab  09/17/18   2146  09/18/18   0344  09/19/18   0447   CREATININE  1.2  1.1  2.0*     Serum creatinine: 2 mg/dL (H) 09/19/18 0447  Estimated creatinine clearance: 26.2 mL/min (A)  Patient with JAMES     Medication: Ciprofloxacin 400 mg IV BID will be changed to medication: Ciprofloxacin 400 mg QD    Pharmacist's Name: Geovanna Childress  Pharmacist's Extension: [phone]

## 2018-09-19 NOTE — HOSPITAL COURSE
Patient admitted to [facility] on 9/18 for acute blood loss anemia 2/2 to upper GIB.  She responded well to 2 U PRBC w/ Hgb increase from 5.7 to >10.  On EGD she was found to have a large duodenal ulcer with concern for possible perforation.  General surgery was consulted on 9/18 and per their assessment she was more likely to have a posterior perforation which would most likely be managed non-operatively.  That afternoon she was also placed on IV cipro & flagyl to cover for gram negative and anaerobic infections.  Despite empiric coverage her sepsis presented itself overnight with a doubling of her white count by the following day on 9/19.  She was switched to Zosyn with improvement in her sepsis noted with decreased lekocytosis.  Her sepsis on 9/18 was complicated by her becoming functionally anephric w/ increased SrCr at 2.0 9/19 am from 1.1 on admission.  Despite 1 L LR bolus her SrCr worsened to 2.6 by 9/19 afternoon c/b hyperkalemia at 5.6 from 5.0 the day before.  CT abdomen confirmed the suspected perforation.  After extensive conversation with the patient's family, the decision was made to pursue comfort care.  The patient passed away 9/21/2018 12:45 AM.

## 2018-09-19 NOTE — PLAN OF CARE
Problem: Patient Care Overview  Goal: Plan of Care Review  Outcome: Ongoing (interventions implemented as appropriate)  Pt oriented to self only. Arouses to voice. VSS. No falls/injury as fall precautions remain in place and hourly rounding being done. Family remains at bedside at all times. No s/s of skin breakdown noted, incontinence care being done and pt placed on waffle mattress. Moon boot applied to right foot. LLE in soft cast and elevated on pillow. Pain remains an issue for pt. PRN meds given. Antibiotics given. Urine sample sent down. NGT remains in place with no output so far this shift. Pt remains NPO per order. Bed in lowest position. Call light within reach. Will continue to monitor.

## 2018-09-19 NOTE — PROGRESS NOTES
Antimicrobial Stewardship Review    : 1933    Estimated Creatinine Clearance: 26.2 mL/min (A) (based on SCr of 2 mg/dL (H)).    Wt Readings from Last 1 Encounters:   18 80.7 kg (178 lb)       Review of patient's allergies indicates:  No Known Allergies    This patient has been identified as being at moderate to high risk for development of a hospital acquired infection (ZIYAD). Based on review of the information provided within the electronic medical record at this time, the Antimicrobial Stewardship Team recommends the following changes to the medication record to aid in mitigating ZIYAD risk during this admission:    Consultation with the infections disease service for additional infectious work up while on antibiotic therapy empirically in the setting of GI bleed with leukocytosis.         Disclaimer: Recommendations provided by the Antimicrobial Stewardship Team do not constitute formal consultation by the Infectious Diseases service. Decisions made are based on readily retrievable data from the electronic medical record at the time of review only. Final treatment decisions rest with the primary team however, the ID service is available for consultation if clinically warranted. Please page [phone] - BUGS for additional questions or advice.

## 2018-09-19 NOTE — PROGRESS NOTES
Per Dr. Mackay's request, Dr. Inman to bedside to asses NG tube placement.  Dr. Inman confirmed that placement is in stomach.  Said to advance to 50 cm then okay to use.  Said no repeat x-ray needed.    Tube advanced to 50 cm. Well-tolerated by patient. Hooked up to intermittent suction.

## 2018-09-19 NOTE — PLAN OF CARE
Problem: Fall Risk (Adult)  Intervention: Patient Rounds  Pt resting in bed, family at bedside. NGT to LIS with no output. Patient states adequate pain control with current pain med reg. States understanding of plan of care. Denies needs at present. Safety and fall precautions maintained.

## 2018-09-19 NOTE — SUBJECTIVE & OBJECTIVE
Interval History:  Please see hospital course.  This morning patient c/o severe abdominal pain.  General surgery placed her on NGT for gastric decompression initially on intermittent suction with minimal output that was changed to continuous suction later that afternoon with improved output noted.    Review of Systems   Constitutional: Positive for fatigue. Negative for chills and fever.   Respiratory: Negative for cough and shortness of breath.    Cardiovascular: Negative for chest pain and palpitations.   Gastrointestinal: Positive for abdominal distention, abdominal pain and nausea. Negative for constipation, diarrhea and vomiting.     Objective:     Vital Signs (Most Recent):  Temp: 96 °F (35.6 °C) (09/19/18 1619)  Pulse: 64 (09/19/18 1619)  Resp: 16 (09/19/18 1619)  BP: (!) 126/58 (09/19/18 1619)  SpO2: 97 % (09/19/18 1619) Vital Signs (24h Range):  Temp:  [96 °F (35.6 °C)-98.8 °F (37.1 °C)] 96 °F (35.6 °C)  Pulse:  [59-68] 64  Resp:  [16-18] 16  SpO2:  [95 %-98 %] 97 %  BP: (114-165)/(54-70) 126/58     Weight: 80.7 kg (178 lb)  Body mass index is 38.52 kg/m².    Intake/Output Summary (Last 24 hours) at 9/19/2018 1843  Last data filed at 9/19/2018 1637  Gross per 24 hour   Intake 2200 ml   Output 0 ml   Net 2200 ml      Physical Exam   Constitutional: She is oriented to person, place, and time. No distress.   Cardiovascular: Normal rate, regular rhythm and intact distal pulses. Exam reveals no gallop and no friction rub.   No murmur heard.  Pulmonary/Chest: Breath sounds normal. No respiratory distress. She has no wheezes. She has no rales.   Abdominal: Soft. She exhibits distension. Bowel sounds are decreased. There is generalized tenderness (tender to light palpation). There is no rigidity and no guarding.   Neurological: She is alert and oriented to person, place, and time.       Significant Labs:   Blood Culture:   Recent Labs   Lab  09/18/18   0130  09/18/18   0132   LABBLOO  No Growth to date  No Growth  to date  No Growth to date  No Growth to date     BMP:   Recent Labs   Lab  09/18/18   0344   09/19/18   1526   GLU  78   < >  89   NA  143   < >  141   K  4.8   < >  5.6*   CL  117*   < >  114*   CO2  21*   < >  17*   BUN  63*   < >  83*   CREATININE  1.1   < >  2.6*   CALCIUM  7.9*   < >  9.0   MG  1.9   --    --     < > = values in this interval not displayed.     CBC:   Recent Labs   Lab  09/19/18   0447  09/19/18   0849  09/19/18   1526   WBC  28.19*  32.67*  26.70*   HGB  9.9*  9.9*  10.8*   HCT  30.7*  31.0*  32.9*   PLT  228  204  187     Cardiac Markers: No results for input(s): CKMB, MYOGLOBIN, BNP, TROPISTAT in the last 48 hours.  Lactic Acid:   Recent Labs   Lab  09/17/18   2147   LACTATE  1.3     FENa = 0.28%    Significant Imaging: Abd XR w/o evidence of free air of flat & erect     EGD:  Impression:   - LA Grade C reflux esophagitis.                        - Normal gastric body.                        - Gastric ulcers with a clean ulcer base (Juan Carlos                         Class III).                        - Gastritis.                        - Duodenitis.                        - One duodenal ulcer with two areas of nonbleeding                         visible vessel (Juan Carlos Class IIa). Ulcer is very                         deep. Treated with bipolar cautery. Defect occured                         following treatment and unable to determine if this                         was transmural. Unable to close defect due to                         fibrosis and inflamed bowel wall. High-risk ulcer                         for perforation.                        - Acquired duodenal stenosis.

## 2018-09-19 NOTE — PLAN OF CARE
09/18/18 1134   Discharge Assessment   Assessment Type Discharge Planning Assessment   Confirmed/corrected address and phone number on facesheet? Yes   Assessment information obtained from? Patient;Caregiver;Medical Record;Other  (patient known to me)   Expected Length of Stay (days) 5   Communicated expected length of stay with patient/caregiver no   Prior to hospitilization cognitive status: Alert/Oriented   Prior to hospitalization functional status: Assistive Equipment;Needs Assistance   Current cognitive status: Alert/Oriented   Current Functional Status: Assistive Equipment;Needs Assistance   Facility Arrived From: [facility]   Lives With child(paty), adult;grandchild(paty)  (daughter Radha and granddaughter Ashley)   Able to Return to Prior Arrangements yes   Is patient able to care for self after discharge? No   Who are your caregiver(s) and their phone number(s)? daughter and granddaughter   Patient's perception of discharge disposition rehab facility  (back to [facility])   Readmission Within The Last 30 Days current reason for admission unrelated to previous admission   If yes, most recent facility name: [facility]   Patient currently being followed by outpatient case management? No   Patient currently receives any other outside agency services? No   Equipment Currently Used at Home walker, rolling;wheelchair   Do you have any problems affording any of your prescribed medications? No   Is the patient taking medications as prescribed? yes   Does the patient have transportation home? Yes   Transportation Available family or friend will provide   Does the patient receive services at the [facility]? No   Discharge Plan A Rehab   Discharge Plan B Skilled Nursing Facility   Patient/Family In Agreement With Plan yes   Readmission Questionnaire   At the time of your discharge, did someone talk to you about what your health problems were? Yes   At the time of discharge, did someone talk to you about what to  watch out for regarding worsening of your health problem? Yes   At the time of discharge, did someone talk to you about what to do if you experienced worsening of your health problem? Yes   At the time of discharge, did someone talk to you about which medication to take when you left the hospital and which ones to stop taking? Yes   At the time of discharge, did someone talk to you about when and where to follow up with a doctor after you left the hospital? Yes   What do you believe caused you to be sick enough to be re-admitted? GIB

## 2018-09-19 NOTE — PT/OT/SLP EVAL
Physical Therapy Evaluation    Patient Name:  Mitzy Perez   MRN:  [id number]    Recommendations:     Discharge Recommendations:  nursing facility, skilled   Discharge Equipment Recommendations: (TBD at SNF)   Barriers to discharge: None    Assessment:     Mitzy Perez is a 85 y.o. female admitted with a medical diagnosis of Acute blood loss anemia.  She presents with the below impairments/functional limitations.  Pt tolerated evaluation well today.  Pt is a good candidate for skilled PT services to address the below deficits and to increase functional independence.      Rehab Prognosis: good; patient would benefit from acute skilled PT services to address these deficits and reach maximum level of function.      Rehab Identified impairments/functional limitations:   weakness, impaired endurance, impaired functional mobilty, gait instability, impaired balance, decreased lower extremity function, decreased ROM, edema, orthopedic precautions, impaired skin, impaired cognition, pain, decreased safety awareness    Recent Surgery: Procedure(s) (LRB):  EGD (ESOPHAGOGASTRODUODENOSCOPY) (N/A) 1 Day Post-Op    Plan:     During this hospitalization, patient to be seen 4 x/week to address the above listed problems via gait training, therapeutic exercises, therapeutic activities, neuromuscular re-education  · Plan of Care Expires:  10/19/18   Plan of Care Reviewed with: patient, daughter    Subjective     Communicated with RN prior to session.  Patient found supine upon PT entry to room, agreeable to evaluation.      Chief Complaint: none stated  Patient comments/goals: none stated    Pain/Comfort:  · Pain Rating 1: (did not rate)  · Location 1: abdomen    Patients cultural, spiritual, Taoism conflicts given the current situation: none noted    Living Environment:  Patient lives in a [facility] with daughter; no steps to enter.   Previous level of function: I in alll ADLs. Patient no longer drives.  Roles and Routines: None  stated  Equipment Used at Home:  none  Assistance upon Discharge: Daughter can provide 24/hr Assistance    Objective:     Patient found with: telemetry, peripheral IV, NG tube     General Precautions: Standard, fall   Orthopedic Precautions:LLE non weight bearing   Braces: (soft cast on LLE)     Exams:    Cognitive/Psychosocial Skills:     -       Oriented to: Person   -       Follows Commands/attention:Easily distracted and Follows one-step commands  -       Communication: dysarthria  -       Safety awareness/insight to disability: impaired     Physical Exams:  · Gross Motor Coordination:  WFL  · Sensation:    · -       Intact  · Skin Integrity/Edema:      · -       Skin integrity: Visible skin intact  · -       Edema: Mild BLE  · RLE ROM: WFL  · RLE Strength: WFL  · LLE ROM: WFL except ankle not tested 2/2 cast  · LLE Strength: WFL except ankle not tested 2/2 cast    Functional Mobility:    Bed Mobility:    · Patient completed Rolling/Turning to Right with maximal assistance  · Patient completed Scooting/Bridging with maximal assistance  · Patient completed Supine to Sit with maximal assistance     Transfers:  · Sit <> Stand: Maximum Assistance with No Assistive Device from EOB   Bed/Chair: Stand Pivot with Maximum Assistance with No Assistive Device    AM-PAC 6 CLICK MOBILITY  Total Score:10     Therapeutic Activities and Exercises:  · PT evaluation performed.  Pt educated on:  PT role and POC  Benefits and importance of OOB activity  To call for assistance to get out of bed   Importance of participation in therapy     Pt safe to transfer with RN staff    Patient left up in chair with all lines intact, call button in reach and RN notified.    GOALS:   Multidisciplinary Problems     Physical Therapy Goals        Problem: Physical Therapy Goal    Goal Priority Disciplines Outcome Goal Variances Interventions   Physical Therapy Goal     PT, PT/OT Ongoing (interventions implemented as appropriate)     Description:   Goals to be met by: 9/29/2018    Patient will increase functional independence with mobility by performing:    -Supine to sit with Contact Guard Assistance  -Sit to supine with Contact Guard Assistance  -Sit to stand transfer with Contact Guard Assistance  -Bed to chair transfer with Contact Guard Assistance using RW  -Gait  x 50 feet with Minimal Assistance using RW.   -Lower extremity exercise program x 30 reps per handout, with independence                     History:     Past Medical History:   Diagnosis Date    Arthritis     Cataract     Essential hypertension 9/5/2018    Macular degeneration        Past Surgical History:   Procedure Laterality Date    CATARACT EXTRACTION  2003    LEFT EYE    EGD (ESOPHAGOGASTRODUODENOSCOPY) N/A 9/18/2018    Performed by Hayder Pradhan MD at [facility] ([address] [place] [address])    ESOPHAGOGASTRODUODENOSCOPY N/A 9/18/2018    Procedure: EGD (ESOPHAGOGASTRODUODENOSCOPY);  Surgeon: Hayder Pradhan MD;  Location: [address]);  Service: Endoscopy;  Laterality: N/A;    FIXATION OF SYNDESMOSIS OF ANKLE Left 9/11/2018    Procedure: FIXATION, SYNDESMOSIS, ANKLE;  Surgeon: Dontae Negro MD;  Location: [facility] OR [address] [place] [address];  Service: Orthopedics;  Laterality: Left;    FIXATION, SYNDESMOSIS, ANKLE Left 9/11/2018    Performed by Dontae Negro MD at [facility] OR [address] [place] [address]    OPEN REDUCTION AND INTERNAL FIXATION (ORIF) OF INJURY OF ANKLE Left 9/11/2018    Procedure: ORIF, ANKLE;  Surgeon: Dontae Negro MD;  Location: [facility] OR [address] [place] [address];  Service: Orthopedics;  Laterality: Left;    ORIF, ANKLE Left 9/11/2018    Performed by Dontae Negro MD at [facility] OR [address] [place] [address]       Time Tracking:     PT Received On: 09/19/18  PT Start Time: 1130     PT Stop Time: 1142  PT Total Time (min): 12 min     Billable Minutes: Evaluation 12      Carloz Aden, PT, DPT  9/19/2018   [phone]

## 2018-09-19 NOTE — PLAN OF CARE
Problem: Occupational Therapy Goal  Goal: Occupational Therapy Goal  Goals to be met by: 9/26/18    Patient will increase functional independence with ADLs by performing:    UE Dressing with Minimal Assistance.  LE Dressing with Minimal Assistance.  Grooming while standing with Minimal Assistance.  Toileting from bedside commode with Minimal Assistance for hygiene and clothing management.   Sitting at edge of bed x10 minutes with Contact Guard Assistance while completing a functional activity.   Supine to sit with Minimal Assistance.  Stand pivot transfers with Minimal Assistance.  Toilet transfer to bedside commode with Minimal Assistance.    Outcome: Ongoing (interventions implemented as appropriate)  POC implemented    Comments: Jaki Watt OTR/L

## 2018-09-19 NOTE — NURSING
No output from NGT noted. Notified Dr. Perez - asked if NGT should be advanced or if imaging needed to be performed. No new orders at this time per MD.

## 2018-09-19 NOTE — NURSING TRANSFER
Nursing Transfer Note      9/18/2018     Transfer To: [facility] from [facility]    Transfer via stretcher    Transfer with 3L to O2, NG tube in place at 50 cm    Transported by PCT x2    Medicines sent: flagyl sent with chart    Chart send with patient: Yes    Notified: daughter     General

## 2018-09-19 NOTE — PLAN OF CARE
Problem: Physical Therapy Goal  Goal: Physical Therapy Goal  Goals to be met by: 9/29/2018    Patient will increase functional independence with mobility by performing:    -Supine to sit with Contact Guard Assistance  -Sit to supine with Contact Guard Assistance  -Sit to stand transfer with Contact Guard Assistance  -Bed to chair transfer with Contact Guard Assistance using RW  -Gait  x 50 feet with Minimal Assistance using RW.   -Lower extremity exercise program x 30 reps per handout, with independence   Outcome: Ongoing (interventions implemented as appropriate)  PT eval complete and POC initiated.

## 2018-09-20 PROBLEM — Z51.5 PALLIATIVE CARE ENCOUNTER: Status: ACTIVE | Noted: 2018-01-01

## 2018-09-20 PROBLEM — Z51.5 COMFORT MEASURES ONLY STATUS: Status: ACTIVE | Noted: 2018-01-01

## 2018-09-20 NOTE — PLAN OF CARE
Problem: Pressure Ulcer Risk (Parrish Scale) (Adult,Obstetrics,Pediatric)  Intervention: Turn/Reposition Often  Pt resting in bed, family at bedside. Pt awakens to voice. NPO, NGT to LIS. Patient states adequate pain control with current pain med reg. States understanding of plan of care. Denies needs at present. Safety and fall precautions maintained.

## 2018-09-20 NOTE — PROGRESS NOTES
[facility]  Gastroenterology  Progress Note    Patient Name: Mitzy Perez  MRN: [id number]  Admission Date: 9/17/2018  Hospital Length of Stay: 1 days  Code Status: DNR   Attending Provider: Flores Sandoval MD  Consulting Provider: Lana Hsieh MD  Primary Care Physician: Marta Michael MD  Principal Problem: Acute blood loss anemia      Subjective:     Interval History: Patient seen this morning with grand-daughter at bedside. Patient's main complaint is ongoing abdominal pain. No bloody output from NGT and no further BM.    Review of Systems   Constitutional: Negative for activity change, appetite change, chills, diaphoresis, fatigue and fever.   HENT: Negative for trouble swallowing and voice change.    Eyes: Negative.    Respiratory: Negative.    Cardiovascular: Negative.    Gastrointestinal: Positive for abdominal pain. Negative for abdominal distention, anal bleeding, blood in stool, constipation, diarrhea, nausea, rectal pain and vomiting.   Genitourinary: Negative.    Neurological: Negative.    Hematological: Negative.      Objective:     Vital Signs (Most Recent):  Temp: 97 °F (36.1 °C) (09/19/18 2000)  Pulse: 64 (09/19/18 2000)  Resp: (!) 22 (09/19/18 2000)  BP: (!) 145/61 (09/19/18 2000)  SpO2: 95 % (09/19/18 2000) Vital Signs (24h Range):  Temp:  [96 °F (35.6 °C)-97.7 °F (36.5 °C)] 97 °F (36.1 °C)  Pulse:  [59-64] 64  Resp:  [16-22] 22  SpO2:  [95 %-97 %] 95 %  BP: (114-145)/(54-65) 145/61     Weight: 80.7 kg (178 lb) (09/17/18 2058)  Body mass index is 38.52 kg/m².      Intake/Output Summary (Last 24 hours) at 9/19/2018 2134  Last data filed at 9/19/2018 1637  Gross per 24 hour   Intake 2200 ml   Output 0 ml   Net 2200 ml       Lines/Drains/Airways     Drain                 NG/OG Tube 09/18/18 1820 nasogastric 16 Fr. Left nostril 1 day          Epidural Line                 Perineural Analgesia/Anesthesia Assessment (using dermatomes) Epidural 09/11/18 0849 8 days           Peripheral Intravenous Line                 Peripheral IV - Single Lumen 09/17/18 2232 Anterior;Left Antecubital 1 day         Peripheral IV - Single Lumen 09/17/18 2240 Anterior;Proximal;Right Forearm 1 day                Physical Exam   Constitutional: She is oriented to person, place, and time. No distress.   HENT:   Head: Normocephalic and atraumatic.   Eyes: No scleral icterus.   Cardiovascular: Normal rate and regular rhythm.   Pulmonary/Chest:   On supplemental oxygen with decreased breath sounds at the bases   Abdominal: Bowel sounds are normal. She exhibits no distension and no mass. There is tenderness. There is no rebound and no guarding. No hernia.   NGT in place with minimal output   Musculoskeletal: She exhibits edema.   Left lower extremity with bandage in place   Neurological: She is alert and oriented to person, place, and time.   Skin: She is not diaphoretic.       Significant Labs:  CBC:   Recent Labs   Lab  09/19/18   0447  09/19/18   0849  09/19/18   1526   WBC  28.19*  32.67*  26.70*   HGB  9.9*  9.9*  10.8*   HCT  30.7*  31.0*  32.9*   PLT  228  204  187     CMP:   Recent Labs   Lab  09/17/18   2146   09/19/18   1526   GLU  94   < >  89   CALCIUM  8.8   < >  9.0   ALBUMIN  1.8*   --    --    PROT  5.5*   --    --    NA  141   < >  141   K  4.8   < >  5.6*   CO2  19*   < >  17*   CL  115*   < >  114*   BUN  71*   < >  83*   CREATININE  1.2   < >  2.6*   ALKPHOS  159*   --    --    ALT  21   --    --    AST  49*   --    --    BILITOT  0.4   --    --     < > = values in this interval not displayed.     Coagulation:   Recent Labs   Lab  09/17/18   2146   INR  1.1   APTT  <21.0         Significant Imaging:  Imaging results within the past 24 hours have been reviewed.    Assessment/Plan:     Peptic ulcer disease with hemorrhage    85 year old female with a history of HTN and recent CVA with a large duodenal ulcer with visible vessel on EGD yesterday. No further evidence of bleeding however continue  abdominal pain with worsening leucocytosis. Possible perforation could account for symptoms however repeat abdominal plain films have been unremarkable. Apart from recommendations below would recommend rule out any concurrent PNA, UTI, etc (in order words concurrent infection on top of possible perforation).     Recommendations:  --NPO with NGT in place  --Serial abdominal exam  --PPI gtt for 72 hours  --Antibiotics  --General surgery recs appreciated              Thank you for your consult. I will follow-up with patient. Please contact us if you have any additional questions.    Lana Hsieh M.D.  Gastroenterology Fellow, PGY-V  Pager: [phone]  [facility]

## 2018-09-20 NOTE — PROGRESS NOTES
CT scan shows free air, and contrast accumulating in RUQ- consistent with noncontained perforation.    Had a discussion her family with Medicine attending, Dr. Sandoval.  The family states that the patient would not want any heroic efforts, and would not want surgery.  She will be made comfort care    Tommy Mackay MD

## 2018-09-20 NOTE — SUBJECTIVE & OBJECTIVE
Past Medical History:   Diagnosis Date    Arthritis     Cataract     Essential hypertension 9/5/2018    Macular degeneration        Past Surgical History:   Procedure Laterality Date    CATARACT EXTRACTION  2003    LEFT EYE    EGD (ESOPHAGOGASTRODUODENOSCOPY) N/A 9/18/2018    Performed by Hayder Pradhan MD at [facility] ([address] [place] [address])    ESOPHAGOGASTRODUODENOSCOPY N/A 9/18/2018    Procedure: EGD (ESOPHAGOGASTRODUODENOSCOPY);  Surgeon: Hayder Pradhan MD;  Location: [facility] ([address] [place] [address]);  Service: Endoscopy;  Laterality: N/A;    FIXATION OF SYNDESMOSIS OF ANKLE Left 9/11/2018    Procedure: FIXATION, SYNDESMOSIS, ANKLE;  Surgeon: Dontae Negro MD;  Location: [facility] OR [address] [place] [address];  Service: Orthopedics;  Laterality: Left;    FIXATION, SYNDESMOSIS, ANKLE Left 9/11/2018    Performed by Dontae Negro MD at [facility] OR [address] [place] [address]    OPEN REDUCTION AND INTERNAL FIXATION (ORIF) OF INJURY OF ANKLE Left 9/11/2018    Procedure: ORIF, ANKLE;  Surgeon: Dontae Negro MD;  Location: [facility] OR [address] [place] [address];  Service: Orthopedics;  Laterality: Left;    ORIF, ANKLE Left 9/11/2018    Performed by Dontae Negro MD at [facility] OR [address] [place] [address]       Review of patient's allergies indicates:  No Known Allergies  Current Facility-Administered Medications   Medication Frequency    0.9%  NaCl infusion (for blood administration) Q24H PRN    acetaminophen tablet 650 mg Q4H PRN    albuterol nebulizer solution 2.5 mg Q4H PRN    benzocaine 20 % oral spray Once    dextrose 50% injection 12.5 g PRN    dextrose 50% injection 25 g PRN    glucagon (human recombinant) injection 1 mg PRN    glucose chewable tablet 16 g PRN    glucose chewable tablet 24 g PRN    glycopyrrolate injection 0.2 mg QID PRN    lorazepam injection 0.5 mg Q30 Min PRN    morphine injection 1 mg Q30 Min PRN    naloxone (NARCAN) 0.4 mg/mL injection     ondansetron injection 4 mg Q6H PRN    ramelteon tablet 8 mg Nightly PRN    scopolamine 1.3-1.5 mg (1 mg over 3 days) 1  patch Q3 Days    sodium chloride 0.9% flush 5 mL PRN     Family History     Problem Relation (Age of Onset)    Retinal detachment Sister    Thyroid disease Sister        Tobacco Use    Smoking status: Never Smoker    Smokeless tobacco: Never Used   Substance and Sexual Activity    Alcohol use: No    Drug use: Not on file    Sexual activity: Not on file     Review of Systems   Unable to perform ROS: Acuity of condition     Objective:     Vital Signs (Most Recent):  Temp: 96.1 °F (35.6 °C) (09/20/18 1121)  Pulse: 70 (09/20/18 1121)  Resp: (!) 24 (09/20/18 1121)  BP: (!) 111/51 (09/20/18 1121)  SpO2: 96 % (09/20/18 1121)  O2 Device (Oxygen Therapy): nasal cannula (09/20/18 1121) Vital Signs (24h Range):  Temp:  [95.3 °F (35.2 °C)-97 °F (36.1 °C)] 96.1 °F (35.6 °C)  Pulse:  [64-76] 70  Resp:  [16-24] 24  SpO2:  [93 %-97 %] 96 %  BP: (110-145)/(51-61) 111/51     Weight: 80.7 kg (178 lb) (09/17/18 2058)  Body mass index is 38.52 kg/m².  Body surface area is 1.8 meters squared.    I/O last 3 completed shifts:  In: 4100 [I.V.:1900; IV Piggyback:2200]  Out: 0     Physical Exam   Constitutional: No distress.   somnolent   Cardiovascular: Normal rate, regular rhythm and intact distal pulses. Exam reveals no gallop and no friction rub.   No murmur heard.  Pulmonary/Chest: No respiratory distress. She has decreased breath sounds. She has no wheezes. She has no rales.   Supplemental oxygen in place   Abdominal: Soft. She exhibits distension. Bowel sounds are decreased. There is generalized tenderness (tender to light palpation). There is no rigidity and no guarding.   Neurological: She is alert.       Significant Labs:  ABGs:   Recent Labs   Lab  09/20/18   0519   PH  7.115*   PCO2  53.9*   HCO3  17.3*   POCSATURATED  87*   BE  -12     CBC:   Recent Labs   Lab  09/20/18   0545   WBC  29.70*   RBC  3.47*   HGB  10.7*   HCT  37.0   PLT  308   MCV  107*   MCH  30.8   MCHC  28.9*     CMP:   Recent Labs   Lab  09/20/18   0545    GLU  40*   CALCIUM  8.2*   ALBUMIN  2.1*   PROT  6.2   NA  141   K  6.9*   CO2  10*   CL  119*   BUN  84*   CREATININE  3.3*   ALKPHOS  140*   ALT  125*   AST  307*   BILITOT  0.9     No results for input(s): COLORU, CLARITYU, SPECGRAV, PHUR, PROTEINUA, GLUCOSEU, BILIRUBINCON, BLOODU, WBCU, RBCU, BACTERIA, MUCUS, NITRITE, LEUKOCYTESUR, UROBILINOGEN, HYALINECASTS in the last 168 hours.    Significant Imaging:  reviewed

## 2018-09-20 NOTE — PHARMACY MED REC
"Admission Medication Reconciliation - Pharmacy Consult Note    The home medication history was taken by Yvette Caceres, pharmacy technician.  Based on information gathered and subsequent review by the clinical pharmacist, the items below may need attention.     You may go to "Admission" then "Reconcile Home Medications" tabs to review and/or act upon these items.     Potentially problematic discrepancies with current MAR  o Patient IS taking the following which was not ordered upon admit  o ASA 81 mg QD (held)  o Atorvastatin 40 mg QD (held)   o Coreg 3.125 mg BID (held)  o Lisinopril 40 mg QD (held)  o Colase 100 mg BID  o Provigil 100 mg QD    Please address this information as you see fit.  Feel free to contact us if you have any questions or require assistance.    Geovanna Childress  EXT [phone]     .    .            "

## 2018-09-20 NOTE — ASSESSMENT & PLAN NOTE
Ms. 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  Hgb has been stable since transfusion 9/19 but she has continued abdominal pain that is requiring close monitoring for possible perforation with 2/2 sepsis which is the likely  of her ARF.  Given multiple physiologic insults recently, suspect her duodenal ulcer is 2/2 to stress.  -Hgb has been stable over last 24 & she had an appropriate response to 2 U PRBC transfusion  -Continue IV protonix 40 mg for total 72 hours s/p EGD  -qd CBC  -Continue NPO w/ NGT set to continuous suction  -Continue serial abdominal exams  -Surgery closely following, assistance appreciated!

## 2018-09-20 NOTE — PROGRESS NOTES
General Surgery Progress Note:    HPI: Mitzy Perez is a pleasant 85 y.o. woman, who is currently in a nursing home after a stroke 1 mo ago, s/p L ankle fracture repair last week, who is admitted to the medicine team for hematemesis.    She was taken to endoscopy today for UGI bleed.  Hb had dropped 2.5 points yesterday, and responded well to 2 PRBC.    A large ulcer was noted, and stopped with cautery.  We are asked to follow along in case of full thickness defect to the duodenum.    In the recovery room, she has some abdominal pain currently, worse in the RUQ.    Interval History: Overnight patient with increased drowsiness and respiratory distress. ABG obtained, noted to have a respiratory acidosis, CXR with increasing pleural effusions, lasix given. On non-rebreather this morning. Otherwise, pain controlled but with continued abdominal tenderness on exam. Remains with minimal output from NGT. Denies significant nausea or vomiting.     Past Medical History:   Diagnosis Date    Arthritis     Cataract     Essential hypertension 9/5/2018    Macular degeneration        Past Surgical History:   Procedure Laterality Date    CATARACT EXTRACTION  2003    LEFT EYE    EGD (ESOPHAGOGASTRODUODENOSCOPY) N/A 9/18/2018    Performed by Hayder Pradhan MD at [facility] ([address] [place] [address])    ESOPHAGOGASTRODUODENOSCOPY N/A 9/18/2018    Procedure: EGD (ESOPHAGOGASTRODUODENOSCOPY);  Surgeon: Hayder Pradhan MD;  Location: [facility] ([address] [place] [address]);  Service: Endoscopy;  Laterality: N/A;    FIXATION OF SYNDESMOSIS OF ANKLE Left 9/11/2018    Procedure: FIXATION, SYNDESMOSIS, ANKLE;  Surgeon: Dontae Negro MD;  Location: [facility] OR [address] [place] [address];  Service: Orthopedics;  Laterality: Left;    FIXATION, SYNDESMOSIS, ANKLE Left 9/11/2018    Performed by Dontae Negro MD at [facility] OR [address] [place] [address]    OPEN REDUCTION AND INTERNAL FIXATION (ORIF) OF INJURY OF ANKLE Left 9/11/2018    Procedure: ORIF, ANKLE;  Surgeon: Dontae Negro MD;  Location: [facility]  OR 2ND FLR;  Service: Orthopedics;  Laterality: Left;    ORIF, ANKLE Left 9/11/2018    Performed by D[facility]ae Negro MD at [facility] OR 2ND FLR       Family History   Problem Relation Age of Onset    Retinal detachment Sister     Thyroid disease Sister     Amblyopia Neg Hx     Blindness Neg Hx     Cancer Neg Hx     Cataracts Neg Hx     Diabetes Neg Hx     Glaucoma Neg Hx     Hypertension Neg Hx     Macular degeneration Neg Hx     Strabismus Neg Hx     Stroke Neg Hx        Social History     Socioeconomic History    Marital status:      Spouse name: Not on file    Number of children: Not on file    Years of education: Not on file    Highest education level: Not on file   Social Needs    Financial resource strain: Not on file    Food insecurity - worry: Not on file    Food insecurity - inability: Not on file    Transportation needs - medical: Not on file    Transportation needs - non-medical: Not on file   Occupational History    Not on file   Tobacco Use    Smoking status: Never Smoker    Smokeless tobacco: Never Used   Substance and Sexual Activity    Alcohol use: No    Drug use: Not on file    Sexual activity: Not on file   Other Topics Concern    Not on file   Social History Narrative    Not on file       No current facility-administered medications on file prior to encounter.      Current Outpatient Medications on File Prior to Encounter   Medication Sig Dispense Refill    acetaminophen (TYLENOL) 650 MG TbSR Take 650 mg by mouth 4 (four) times daily with meals and nightly.      albuterol-ipratropium (DUO-NEB) 2.5 mg-0.5 mg/3 mL nebulizer solution Take 3 mLs by nebulization every 6 (six) hours as needed for Wheezing or Shortness of Breath. Rescue      aspirin (ECOTRIN) 81 MG EC tablet Take 81 mg by mouth every evening.       atorvastatin (LIPITOR) 40 MG tablet Take 1 tablet (40 mg total) by mouth once daily. 90 tablet 3    bisacodyl (DULCOLAX) 5 mg EC tablet Take 5 mg by mouth  daily as needed for Constipation.      carvedilol (COREG) 3.125 MG tablet Take 3.125 mg by mouth 2 (two) times daily.      docusate sodium (COLACE) 100 MG capsule Take 100 mg by mouth 2 (two) times daily.      enoxaparin (LOVENOX) 30 mg/0.3 mL Syrg Inject 30 mg into the skin once daily.      hydralazine HCl (HYDRALAZINE ORAL) Take by mouth daily as needed.      HYDROcodone-acetaminophen (NORCO) 5-325 mg per tablet Take 1 tablet by mouth every 6 (six) hours as needed for Pain. 30 tablet 0    lactulose (CHRONULAC) 10 gram/15 mL solution Take by mouth as needed.      lisinopril (PRINIVIL,ZESTRIL) 40 MG tablet Take 40 mg by mouth once daily.      modafinil (PROVIGIL) 100 MG Tab Take 100 mg by mouth once daily.      ondansetron HCl (ZOFRAN ORAL) Take by mouth as needed (NAUSEA).      pantoprazole (PROTONIX) 40 MG tablet Take 40 mg by mouth once daily.      simethicone (MYLICON) 125 MG chewable tablet Take 125 mg by mouth 4 (four) times daily with meals and nightly.         Review of patient's allergies indicates:  No Known Allergies    ROS: Constitutional: no fever or chills, pain controlled   Respiratory: no cough or shortness of breath   Cardiovascular: no chest pain or palpitations   Genitourinary: no hematuria or dysuria   Hematologic/Lymphatic: no easy bruising or lymphadenopathy   Musculoskeletal: no arthralgias or myalgias   Neurological: no seizures or tremors     Phys:  Vitals:    09/19/18 2000 09/19/18 2243 09/20/18 0430 09/20/18 0519   BP: (!) 145/61 113/60 (!) 111/58    BP Location: Right arm Right arm Right arm    Patient Position: Lying Lying Lying    Pulse: 64 73 67    Resp: (!) 22 18     Temp: 97 °F (36.1 °C) 96.2 °F (35.7 °C) (!) 95.3 °F (35.2 °C)    TempSrc: Axillary Oral Axillary    SpO2: 95% (!) 93% (!) 93% (!) 93%   Weight:       Height:          Phys:   Gen: NAD   HEENT: NCAT, trachea midline  Pulm: Unlabored  Abd: Soft, moderate ttp RUQ. No rebound, guarding.   Extremities: no cyanosis  or edema, or clubbing  Skin: Skin color, texture, turgor normal. No rashes or lesions     A/P 85 year old woman, with bleeding duodenal ulcer s/p endoscopic therapy, concern for perforation    Possible perforation:  Agree with NPO, IVF, IV abx  Continue NGT to LIWS  Given worsening leukocytosis and failure of patient to improve clinically will obtain, non-contrast CT abdomen/pelvis with PO contrast via NGT this morning  Continue to trend WBC  Even so, if perforation is present, it would likely be posterior, and there would be a large potential for non operative management    UGI bleed:  Agree with protonix, consider carafate  Serial CBC  Large bore IV access  No coagulopathy is present    Respiratory distress:  Given ABG and CXR, agree with diuresis   Recommend ABGs prn and continuous pulse ox  Wean supplemental O2 as tolerated     Will follow with you    Savannah Crane MD  PGY-2 General Surgery   [phone]

## 2018-09-20 NOTE — CONSULTS
[facility]  Palliative Medicine  Consult Note    Patient Name: Mitzy Perez  MRN: [id number]  Admission Date: 9/17/2018  Hospital Length of Stay: 2 days  Code Status: DNR   Attending Provider: Flores Sandoval MD  Consulting Provider: CHERRY Rivas  Primary Care Physician: Marta Michael MD  Principal Problem:Acute blood loss anemia    Patient information was obtained from relative(s) and ER records.      Inpatient consult to Palliative Care  Consult performed by: CHERRY Xie  Consult ordered by: Rosaline Willis MD        Assessment/Plan:     Palliative care encounter    Impression: Pt is an 85 year old female with a history of HTN and recent CVA with a large duodenal ulcer with visible vessel on EGD with likely perforations. Pt is on comfort care at this time. No distress noted at this time with pt. Pt is not responsive to voice.     Goals of care: Met with pt's daughter, two grand-daughters, god-child, and god-child's daughter. Per family, goal is comfort care. Family waiting for extended family to visit. Support given/ visiting.     Comfort care meds:    Pt on Morphine 2 mg IVP q 4hrs prn pain.   Pt would benefit from Morphine 1mg IVP q 30 minutes prn pain/dyspnea.     Pt on Ativan 0.5 mg oral q 30 minutes prn agitation.   Would change route to IVP due to pt's mental acuity.    Secretions:  Scopolamine patch just placed.   Would add glycopyrrolate 0.2 mg qid prn secretions.  Patch takes about 8 hours to be effective.     Called and spoke to Dr. Daily with IM. Per jenny CHOPRA for LUAN to enter above orders.     Pal care will continue to follow.   Pt on comfort care.             Thank you for your consult. I will follow-up with patient. Please contact us if you have any additional questions.    Subjective:     HPI:   Pt is an 85 year old female with a history of HTN and recent CVA admitted with GI bleed in the setting of coffee ground emesis and  melena.     History provided by daughter Per chart review:   Daughter reported that for the last couple of days patient has been having issues with abdominal pain and her bowel movements.   On Thursday and Saturday daughter reported 2 tarry blood movements (look like black volcano lava). On Saturday she also began to complain of epigastric pain which was sharp/constant/non-radiating in nature. On Sunday she continued to complain of epigastric pain and had 2 more BM however daughter is unsure if they were tarry as she did not see the bowel movements.   Then yesterday she had an episode of coffee ground emesis. The family took a picture of the emesis and it was indeed coffee ground emesis.   Upon presenting to the ED she was HD stable but hemoglobin was down trending (had also been checked at the SNF and it also down trending) with a black BM documented here at [facility].   She also had a CT abdomen which showed:  --Bowel wall thickening within duodenum, proximal jejunum, and ascending colon with mild mesenteric stranding and small volume ascites.    --Findings are favored to represent a nonspecific enterocolitis which may be due to inflammatory, infectious, or ischemic etiology.     Of note patient had an ischemic stroke on 9/5 and a left ankle fracture on 9/8. Furthermore she has been on ASA and Lovenox at her SNF with last dose yesterday (unsure if she was on a PPI). No prior EGD/colonoscopy and no prior episodes similar to this one.    At this time, pt is a DNR, on comfort care.       Hospital Course:  No notes on file        Past Medical History:   Diagnosis Date    Arthritis     Cataract     Essential hypertension 9/5/2018    Macular degeneration        Past Surgical History:   Procedure Laterality Date    CATARACT EXTRACTION  2003    LEFT EYE    EGD (ESOPHAGOGASTRODUODENOSCOPY) N/A 9/18/2018    Performed by Hayder Pradhan MD at [facility] (2ND FLR)    ESOPHAGOGASTRODUODENOSCOPY N/A 9/18/2018    Procedure: EGD  (ESOPHAGOGASTRODUODENOSCOPY);  Surgeon: Hayder Pradhan MD;  Location: [facility] ([address] [place] [address]);  Service: Endoscopy;  Laterality: N/A;    FIXATION OF SYNDESMOSIS OF ANKLE Left 9/11/2018    Procedure: FIXATION, SYNDESMOSIS, ANKLE;  Surgeon: Dontae Negro MD;  Location: [facility] OR [address] [place] [address];  Service: Orthopedics;  Laterality: Left;    FIXATION, SYNDESMOSIS, ANKLE Left 9/11/2018    Performed by Dontae Negro MD at [facility] OR [address] [place] [address]    OPEN REDUCTION AND INTERNAL FIXATION (ORIF) OF INJURY OF ANKLE Left 9/11/2018    Procedure: ORIF, ANKLE;  Surgeon: Dontae Negro MD;  Location: [facility] OR [address] [place] [address];  Service: Orthopedics;  Laterality: Left;    ORIF, ANKLE Left 9/11/2018    Performed by Dontae Negro MD at [facility] OR [address] [place] [address]       Review of patient's allergies indicates:  No Known Allergies    Medications:  Continuous Infusions:  Scheduled Meds:   benzocaine   Mouth/Throat Once    naloxone        scopolamine  1 patch Transdermal Q3 Days     PRN Meds:sodium chloride, acetaminophen, albuterol sulfate, dextrose 50%, dextrose 50%, glucagon (human recombinant), glucose, glucose, glycopyrrolate, lorazepam, morphine, ondansetron, ramelteon, sodium chloride 0.9%    Family History     Problem Relation (Age of Onset)    Retinal detachment Sister    Thyroid disease Sister        Tobacco Use    Smoking status: Never Smoker    Smokeless tobacco: Never Used   Substance and Sexual Activity    Alcohol use: No    Drug use: Not on file    Sexual activity: Not on file       Review of Systems   Unable to perform ROS: Acuity of condition     Objective:     Vital Signs (Most Recent):  Temp: 96.1 °F (35.6 °C) (09/20/18 1121)  Pulse: 70 (09/20/18 1121)  Resp: (!) 24 (09/20/18 1121)  BP: (!) 111/51 (09/20/18 1121)  SpO2: 96 % (09/20/18 1121) Vital Signs (24h Range):  Temp:  [95.3 °F (35.2 °C)-97 °F (36.1 °C)] 96.1 °F (35.6 °C)  Pulse:  [64-76] 70  Resp:  [16-24] 24  SpO2:  [93 %-97 %] 96 %  BP: (110-145)/(51-61) 111/51     Weight: 80.7  kg (178 lb)  Body mass index is 38.52 kg/m².    Review of Symptoms  Symptom Assessment (ESAS 0-10 scale)   ESAS 0 1 2 3 4 5 6 7 8 9 10   Pain              Dyspnea              Anxiety              Nausea              Depression               Anorexia              Fatigue              Insomnia              Restlessness               Agitation              CAM / Delirium __ --  ___+   Constipation     __ --  ___+   Diarrhea           __ --  ___+      Comments: unable to due ROS due to pt's mental status    Performance Status: 10      Physical Exam   Constitutional: She has a sickly appearance.   Pulmonary/Chest: No respiratory distress.   Neurological: She is unresponsive.   Skin: Skin is warm and dry.       Significant Labs: All pertinent labs within the past 24 hours have been reviewed.  CBC:   Recent Labs   Lab  09/20/18   0545   WBC  29.70*   HGB  10.7*   HCT  37.0   MCV  107*   PLT  308     BMP:  Recent Labs   Lab  09/20/18   0545   GLU  40*   NA  141   K  6.9*   CL  119*   CO2  10*   BUN  84*   CREATININE  3.3*   CALCIUM  8.2*   MG  2.2     LFT:  Lab Results   Component Value Date     (H) 09/20/2018    ALKPHOS 140 (H) 09/20/2018    BILITOT 0.9 09/20/2018     Albumin:   Albumin   Date Value Ref Range Status   09/20/2018 2.1 (L) 3.5 - 5.2 g/dL Final     Protein:   Total Protein   Date Value Ref Range Status   09/20/2018 6.2 6.0 - 8.4 g/dL Final     Lactic acid:   Lab Results   Component Value Date    LACTATE 2.9 (H) 09/20/2018    LACTATE 2.3 (H) 09/20/2018       Significant Imaging: I have reviewed all pertinent imaging results/findings within the past 24 hours.    Advanced Directives::  Living Will: Yes.   LaPOST: Yes  Do Not Resuscitate Status: Yes  Medical Power of : Pt's daughterRadha.    Decision-Making Capacity: Family answered questions    Living Arrangements: Lives in home    Psychosocial/Cultural:  Pt was living with daughter and grand-daughter.    Spiritual: yes    F- Christine and Belief:  Cruz    I - Importance:   .  C - Community:    A - Address in Care:  visting.       > 50% of 55 min visit spent in chart review, face to face discussion of goals of care,  symptom assessment, coordination of care and emotional support.    Fani Freeman, CNS  Palliative Medicine  [facility]

## 2018-09-20 NOTE — PLAN OF CARE
MALIKA called and spoke with Scooter with [facility] about possibly assisting with home infusions for someone with out insurance, SW provided information for a call back regarding pt needs.     MALIKA spoke with Scooter [phone] expressed that she will able to work with pt about help with infusion needs, MALIKA will continue to follow ,

## 2018-09-20 NOTE — CONSULTS
[facility]  Nephrology  Consult Note    Patient Name: Mitzy Perez  MRN: [id number]  Admission Date: 9/17/2018  Hospital Length of Stay: 2 days  Attending Provider: Flores Sandoval MD   Primary Care Physician: Marta Michael MD  Principal Problem:Acute blood loss anemia    Inpatient consult to Nephrology  Consult performed by: Vivian Miller MD  Consult ordered by: Félix Glass MD  Reason for consult: ARF        Subjective:     HPI: Ms. 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 CT scan now concerning for RUQ perforation.    Nephrology consulted for JAMES and poor urine output after lasix 120 IV x1 administered. Patient presented with BUN/Cr of 71/1.2, worsening now to 84/3.3 with electrolyte abnormalities: hyperkalemia and hyperphosphatemia. ABG showed pH 7.115, CO2 53.9, HCO3 17.3. Urine output unmeasured over the last 24 hours.     Past Medical History:   Diagnosis Date    Arthritis     Cataract     Essential hypertension 9/5/2018    Macular degeneration        Past Surgical History:   Procedure Laterality Date    CATARACT EXTRACTION  2003    LEFT EYE    EGD (ESOPHAGOGASTRODUODENOSCOPY) N/A 9/18/2018    Performed by Hayder Pradhan MD at [facility] ([address] [place] [address])    ESOPHAGOGASTRODUODENOSCOPY N/A 9/18/2018    Procedure: EGD (ESOPHAGOGASTRODUODENOSCOPY);  Surgeon: Hayder Pradhan MD;  [facility]  Service: Endoscopy;  Laterality: N/A;    FIXATION OF SYNDESMOSIS OF ANKLE Left 9/11/2018    Procedure: FIXATION, SYNDESMOSIS, ANKLE;  Surgeon: Dontae  DAREK Negro MD;  Location: [facility] OR [address] [place] [address];  Service: Orthopedics;  Laterality: Left;    FIXATION, SYNDESMOSIS, ANKLE Left 9/11/2018    Performed by Dontae Negro MD at [facility] OR [address] [place] [address]    OPEN REDUCTION AND INTERNAL FIXATION (ORIF) OF INJURY OF ANKLE Left 9/11/2018    Procedure: ORIF, ANKLE;  Surgeon: Dontae Negro MD;  Location: [facility] OR [address] [place] [address];  Service: Orthopedics;  Laterality: Left;    ORIF, ANKLE Left 9/11/2018    Performed by Dontae Negro MD at [facility] OR [address] [place] [address]       Review of patient's allergies indicates:  No Known Allergies  Current Facility-Administered Medications   Medication Frequency    0.9%  NaCl infusion (for blood administration) Q24H PRN    acetaminophen tablet 650 mg Q4H PRN    albuterol nebulizer solution 2.5 mg Q4H PRN    benzocaine 20 % oral spray Once    dextrose 50% injection 12.5 g PRN    dextrose 50% injection 25 g PRN    glucagon (human recombinant) injection 1 mg PRN    glucose chewable tablet 16 g PRN    glucose chewable tablet 24 g PRN    glycopyrrolate injection 0.2 mg QID PRN    lorazepam injection 0.5 mg Q30 Min PRN    morphine injection 1 mg Q30 Min PRN    naloxone (NARCAN) 0.4 mg/mL injection     ondansetron injection 4 mg Q6H PRN    ramelteon tablet 8 mg Nightly PRN    scopolamine 1.3-1.5 mg (1 mg over 3 days) 1 patch Q3 Days    sodium chloride 0.9% flush 5 mL PRN     Family History     Problem Relation (Age of Onset)    Retinal detachment Sister    Thyroid disease Sister        Tobacco Use    Smoking status: Never Smoker    Smokeless tobacco: Never Used   Substance and Sexual Activity    Alcohol use: No    Drug use: Not on file    Sexual activity: Not on file     Review of Systems   Unable to perform ROS: Acuity of condition     Objective:     Vital Signs (Most Recent):  Temp: 96.1 °F (35.6 °C) (09/20/18 1121)  Pulse: 70 (09/20/18 1121)  Resp: (!) 24 (09/20/18 1121)  BP: (!) 111/51 (09/20/18 1121)  SpO2: 96 % (09/20/18 1121)  O2 Device  (Oxygen Therapy): nasal cannula (09/20/18 1121) Vital Signs (24h Range):  Temp:  [95.3 °F (35.2 °C)-97 °F (36.1 °C)] 96.1 °F (35.6 °C)  Pulse:  [64-76] 70  Resp:  [16-24] 24  SpO2:  [93 %-97 %] 96 %  BP: (110-145)/(51-61) 111/51     Weight: 80.7 kg (178 lb) (09/17/18 2058)  Body mass index is 38.52 kg/m².  Body surface area is 1.8 meters squared.    I/O last 3 completed shifts:  In: 4100 [I.V.:1900; IV Piggyback:2200]  Out: 0     Physical Exam   Constitutional: No distress.   somnolent   Cardiovascular: Normal rate, regular rhythm and intact distal pulses. Exam reveals no gallop and no friction rub.   No murmur heard.  Pulmonary/Chest: No respiratory distress. She has decreased breath sounds. She has no wheezes. She has no rales.   Supplemental oxygen in place   Abdominal: Soft. She exhibits distension. Bowel sounds are decreased. There is generalized tenderness (tender to light palpation). There is no rigidity and no guarding.   Neurological: She is alert.       Significant Labs:  ABGs:   Recent Labs   Lab  09/20/18   0519   PH  7.115*   PCO2  53.9*   HCO3  17.3*   POCSATURATED  87*   BE  -12     CBC:   Recent Labs   Lab  09/20/18   0545   WBC  29.70*   RBC  3.47*   HGB  10.7*   HCT  37.0   PLT  308   MCV  107*   MCH  30.8   MCHC  28.9*     CMP:   Recent Labs   Lab  09/20/18   0545   GLU  40*   CALCIUM  8.2*   ALBUMIN  2.1*   PROT  6.2   NA  141   K  6.9*   CO2  10*   CL  119*   BUN  84*   CREATININE  3.3*   ALKPHOS  140*   ALT  125*   AST  307*   BILITOT  0.9     No results for input(s): COLORU, CLARITYU, SPECGRAV, PHUR, PROTEINUA, GLUCOSEU, BILIRUBINCON, BLOODU, WBCU, RBCU, BACTERIA, MUCUS, NITRITE, LEUKOCYTESUR, UROBILINOGEN, HYALINECASTS in the last 168 hours.    Significant Imaging:  reviewed    Assessment/Plan:     Acute renal failure    85 year old female with complicated recent medical history and multi-organ failure. Nephrology consulted for ART after minimal response to lasix 120 IV x1 given by primary  team.    -patient is not a dialysis candidate, not even temporary given current medical issues (CT scan showing non-contained perforation in RUQ)  -discussed with family at bedside, will not pursue dialysis and they wish to transition patient to comfort care  -recommend sodium bicarb to maintain pH > 7.2  -recommend shifting for hyperkalemia   -nephrology will sign off            Thank you for your consult. I will sign off. Please contact us if you have any additional questions.    Vivian Miller MD  Nephrology  [facility]

## 2018-09-20 NOTE — SUBJECTIVE & OBJECTIVE
Subjective:     Interval History: Patient seen this morning with grand-daughter at bedside. Patient's main complaint is ongoing abdominal pain. No bloody output from NGT and no further BM.    Review of Systems   Constitutional: Negative for activity change, appetite change, chills, diaphoresis, fatigue and fever.   HENT: Negative for trouble swallowing and voice change.    Eyes: Negative.    Respiratory: Negative.    Cardiovascular: Negative.    Gastrointestinal: Positive for abdominal pain. Negative for abdominal distention, anal bleeding, blood in stool, constipation, diarrhea, nausea, rectal pain and vomiting.   Genitourinary: Negative.    Neurological: Negative.    Hematological: Negative.      Objective:     Vital Signs (Most Recent):  Temp: 97 °F (36.1 °C) (09/19/18 2000)  Pulse: 64 (09/19/18 2000)  Resp: (!) 22 (09/19/18 2000)  BP: (!) 145/61 (09/19/18 2000)  SpO2: 95 % (09/19/18 2000) Vital Signs (24h Range):  Temp:  [96 °F (35.6 °C)-97.7 °F (36.5 °C)] 97 °F (36.1 °C)  Pulse:  [59-64] 64  Resp:  [16-22] 22  SpO2:  [95 %-97 %] 95 %  BP: (114-145)/(54-65) 145/61     Weight: 80.7 kg (178 lb) (09/17/18 2058)  Body mass index is 38.52 kg/m².      Intake/Output Summary (Last 24 hours) at 9/19/2018 2134  Last data filed at 9/19/2018 1637  Gross per 24 hour   Intake 2200 ml   Output 0 ml   Net 2200 ml       Lines/Drains/Airways     Drain                 NG/OG Tube 09/18/18 1820 nasogastric 16 Fr. Left nostril 1 day          Epidural Line                 Perineural Analgesia/Anesthesia Assessment (using dermatomes) Epidural 09/11/18 0849 8 days          Peripheral Intravenous Line                 Peripheral IV - Single Lumen 09/17/18 2232 Anterior;Left Antecubital 1 day         Peripheral IV - Single Lumen 09/17/18 2240 Anterior;Proximal;Right Forearm 1 day                Physical Exam   Constitutional: She is oriented to person, place, and time. No distress.   HENT:   Head: Normocephalic and atraumatic.   Eyes: No  scleral icterus.   Cardiovascular: Normal rate and regular rhythm.   Pulmonary/Chest:   On supplemental oxygen with decreased breath sounds at the bases   Abdominal: Bowel sounds are normal. She exhibits no distension and no mass. There is tenderness. There is no rebound and no guarding. No hernia.   NGT in place with minimal output   Musculoskeletal: She exhibits edema.   Left lower extremity with bandage in place   Neurological: She is alert and oriented to person, place, and time.   Skin: She is not diaphoretic.       Significant Labs:  CBC:   Recent Labs   Lab  09/19/18   0447  09/19/18   0849  09/19/18   1526   WBC  28.19*  32.67*  26.70*   HGB  9.9*  9.9*  10.8*   HCT  30.7*  31.0*  32.9*   PLT  228  204  187     CMP:   Recent Labs   Lab  09/17/18   2146   09/19/18   1526   GLU  94   < >  89   CALCIUM  8.8   < >  9.0   ALBUMIN  1.8*   --    --    PROT  5.5*   --    --    NA  141   < >  141   K  4.8   < >  5.6*   CO2  19*   < >  17*   CL  115*   < >  114*   BUN  71*   < >  83*   CREATININE  1.2   < >  2.6*   ALKPHOS  159*   --    --    ALT  21   --    --    AST  49*   --    --    BILITOT  0.4   --    --     < > = values in this interval not displayed.     Coagulation:   Recent Labs   Lab  09/17/18 2146   INR  1.1   APTT  <21.0         Significant Imaging:  Imaging results within the past 24 hours have been reviewed.

## 2018-09-20 NOTE — NURSING
Pt was noted to be very drowsy, not responding to voice with increased RR and effort. SPO2 77% on RA, pt placed on 3L  face mask due to mouth breathing and pt's SpO2 increased <92%. Pt only had one small void over night. Notified Dr. Davis with IM. MD arrived at bedside, per MD place haines to monitor UO, give IVP lasix, narcan, CXR, abd xray and ABG. Haines placed with only 5cc of urine returned. Labs and imaging obtained.     Per Dr. Davis, ok to only give 0.4mg of narcan. After narcan administration pt did open her eyes to voice but is still drowsy.

## 2018-09-20 NOTE — SUBJECTIVE & OBJECTIVE
Past Medical History:   Diagnosis Date    Arthritis     Cataract     Essential hypertension 9/5/2018    Macular degeneration        Past Surgical History:   Procedure Laterality Date    CATARACT EXTRACTION  2003    LEFT EYE    EGD (ESOPHAGOGASTRODUODENOSCOPY) N/A 9/18/2018    Performed by Hayder Pradhan MD at [facility] ([address] [place] [address])    ESOPHAGOGASTRODUODENOSCOPY N/A 9/18/2018    Procedure: EGD (ESOPHAGOGASTRODUODENOSCOPY);  Surgeon: Hayder Pradhan MD;  Location: [address]);  Service: Endoscopy;  Laterality: N/A;    FIXATION OF SYNDESMOSIS OF ANKLE Left 9/11/2018    Procedure: FIXATION, SYNDESMOSIS, ANKLE;  Surgeon: Dontae Negro MD;  Location: [facility] OR [address] [place] [address];  Service: Orthopedics;  Laterality: Left;    FIXATION, SYNDESMOSIS, ANKLE Left 9/11/2018    Performed by Dontae Negro MD at [facility] OR [address] [place] [address]    OPEN REDUCTION AND INTERNAL FIXATION (ORIF) OF INJURY OF ANKLE Left 9/11/2018    Procedure: ORIF, ANKLE;  Surgeon: Dontae Negro MD;  Location: [facility] OR [address] [place] [address];  Service: Orthopedics;  Laterality: Left;    ORIF, ANKLE Left 9/11/2018    Performed by Dontae Negro MD at [facility] OR [address] [place] [address]       Review of patient's allergies indicates:  No Known Allergies    Medications:  Continuous Infusions:  Scheduled Meds:   benzocaine   Mouth/Throat Once    naloxone        scopolamine  1 patch Transdermal Q3 Days     PRN Meds:sodium chloride, acetaminophen, albuterol sulfate, dextrose 50%, dextrose 50%, glucagon (human recombinant), glucose, glucose, glycopyrrolate, lorazepam, morphine, ondansetron, ramelteon, sodium chloride 0.9%    Family History     Problem Relation (Age of Onset)    Retinal detachment Sister    Thyroid disease Sister        Tobacco Use    Smoking status: Never Smoker    Smokeless tobacco: Never Used   Substance and Sexual Activity    Alcohol use: No    Drug use: Not on file    Sexual activity: Not on file       Review of Systems   Unable to perform ROS: Acuity of  condition     Objective:     Vital Signs (Most Recent):  Temp: 96.1 °F (35.6 °C) (09/20/18 1121)  Pulse: 70 (09/20/18 1121)  Resp: (!) 24 (09/20/18 1121)  BP: (!) 111/51 (09/20/18 1121)  SpO2: 96 % (09/20/18 1121) Vital Signs (24h Range):  Temp:  [95.3 °F (35.2 °C)-97 °F (36.1 °C)] 96.1 °F (35.6 °C)  Pulse:  [64-76] 70  Resp:  [16-24] 24  SpO2:  [93 %-97 %] 96 %  BP: (110-145)/(51-61) 111/51     Weight: 80.7 kg (178 lb)  Body mass index is 38.52 kg/m².    Review of Symptoms  Symptom Assessment (ESAS 0-10 scale)   ESAS 0 1 2 3 4 5 6 7 8 9 10   Pain              Dyspnea              Anxiety              Nausea              Depression               Anorexia              Fatigue              Insomnia              Restlessness               Agitation              CAM / Delirium __ --  ___+   Constipation     __ --  ___+   Diarrhea           __ --  ___+      Comments: unable to due ROS due to pt's mental status    Performance Status: 10      Physical Exam   Constitutional: She has a sickly appearance.   Pulmonary/Chest: No respiratory distress.   Neurological: She is unresponsive.   Skin: Skin is warm and dry.       Significant Labs: All pertinent labs within the past 24 hours have been reviewed.  CBC:   Recent Labs   Lab  09/20/18   0545   WBC  29.70*   HGB  10.7*   HCT  37.0   MCV  107*   PLT  308     BMP:  Recent Labs   Lab  09/20/18   0545   GLU  40*   NA  141   K  6.9*   CL  119*   CO2  10*   BUN  84*   CREATININE  3.3*   CALCIUM  8.2*   MG  2.2     LFT:  Lab Results   Component Value Date     (H) 09/20/2018    ALKPHOS 140 (H) 09/20/2018    BILITOT 0.9 09/20/2018     Albumin:   Albumin   Date Value Ref Range Status   09/20/2018 2.1 (L) 3.5 - 5.2 g/dL Final     Protein:   Total Protein   Date Value Ref Range Status   09/20/2018 6.2 6.0 - 8.4 g/dL Final     Lactic acid:   Lab Results   Component Value Date    LACTATE 2.9 (H) 09/20/2018    LACTATE 2.3 (H) 09/20/2018       Significant Imaging: I have reviewed all  pertinent imaging results/findings within the past 24 hours.    Advanced Directives::  Living Will: Yes.   LaPOST: Yes  Do Not Resuscitate Status: Yes  Medical Power of : Pt's daughterRadha.    Decision-Making Capacity: Family answered questions    Living Arrangements: Lives in home    Psychosocial/Cultural:  Pt was living with daughter and grand-daughter.    Spiritual: yes    F- Christine and Belief: Zoroastrian    I - Importance:   .  C - Community:    A - Address in Care: [address].

## 2018-09-20 NOTE — ASSESSMENT & PLAN NOTE
Patient had little response to 1 L LR bolus but her Hgb did not show any evidence of dilution either on repeat afternoon CBC, johnnie indicating she has continued volume losses.  Her Andrew = 0.28% & in the setting of sepsis, most likely 2/2 to pre-renal volume contraction.  -1L NS bolus w/ 150 cc/hr x 10hr  -23:00 BMP to monitor renal function

## 2018-09-20 NOTE — PROGRESS NOTES
[facility] Hospital Medicine  Progress Note    Patient Name: Mitzy Perez  MRN: [id number]  Patient Class: IP- Inpatient   Admission Date: 9/17/2018  Length of Stay: 1 days  Attending Physician: Flores Sandoval MD  Primary Care Provider: Marta Michael MD    [facility] Medicine Team: [facility] HOSP MED 3 Jay Mansfield MD    Subjective:     Principal Problem:Acute blood loss anemia    Hospital Course:  Patient admitted to [facility] on 9/18 for acute blood loss anemia 2/2 to upper GIB.  She responded well to 2 U PRBC w/ Hgb increase from 5.7 to >10.  On EGD she was found to have a large duodenal ulcer withj concern for possible perforation.  General surgery was consulted on 9/18 and per their assessment she was more likely to have a posterior perforation which would most likely be managed non-operatively.  That afternoon she was also placed on IV cipro & flagyl to cover for gram negative and anaerobic infections.  Despite empiric coverage her sepsis presented itself overnight with a doubling of her white count by the following day on 9/19.  She was switched to Zosyn with improvement in her sepsis noted with decreased lekocytosis.  Her sepsis on 9/18 was complicated by her becoming functionally anephric w/ increased SrCr at 2.0 9/19 am from 1.1 on admission.  Despite 1 L LR bolus her SrCr worsened to 2.6 by 9/19 afternoon now c/b hyperkalemia at 5.6 from 5.0 the day before.    Interval History:  Please see hospital course.  This morning patient c/o severe abdominal pain.  General surgery placed her on NGT for gastric decompression initially on intermittent suction with minimal output that was changed to continuous suction later that afternoon with improved output noted.    Review of Systems   Constitutional: Positive for fatigue. Negative for chills and fever.   Respiratory: Negative for cough and shortness of breath.    Cardiovascular: Negative for chest pain and palpitations.   Gastrointestinal:  Positive for abdominal distention, abdominal pain and nausea. Negative for constipation, diarrhea and vomiting.     Objective:     Vital Signs (Most Recent):  Temp: 96 °F (35.6 °C) (09/19/18 1619)  Pulse: 64 (09/19/18 1619)  Resp: 16 (09/19/18 1619)  BP: (!) 126/58 (09/19/18 1619)  SpO2: 97 % (09/19/18 1619) Vital Signs (24h Range):  Temp:  [96 °F (35.6 °C)-98.8 °F (37.1 °C)] 96 °F (35.6 °C)  Pulse:  [59-68] 64  Resp:  [16-18] 16  SpO2:  [95 %-98 %] 97 %  BP: (114-165)/(54-70) 126/58     Weight: 80.7 kg (178 lb)  Body mass index is 38.52 kg/m².    Intake/Output Summary (Last 24 hours) at 9/19/2018 1843  Last data filed at 9/19/2018 1637  Gross per 24 hour   Intake 2200 ml   Output 0 ml   Net 2200 ml      Physical Exam   Constitutional: She is oriented to person, place, and time. No distress.   Cardiovascular: Normal rate, regular rhythm and intact distal pulses. Exam reveals no gallop and no friction rub.   No murmur heard.  Pulmonary/Chest: Breath sounds normal. No respiratory distress. She has no wheezes. She has no rales.   Abdominal: Soft. She exhibits distension. Bowel sounds are decreased. There is generalized tenderness (tender to light palpation). There is no rigidity and no guarding.   Neurological: She is alert and oriented to person, place, and time.       Significant Labs:   Blood Culture:   Recent Labs   Lab  09/18/18   0130  09/18/18   0132   LABBLOO  No Growth to date  No Growth to date  No Growth to date  No Growth to date     BMP:   Recent Labs   Lab  09/18/18   0344   09/19/18   1526   GLU  78   < >  89   NA  143   < >  141   K  4.8   < >  5.6*   CL  117*   < >  114*   CO2  21*   < >  17*   BUN  63*   < >  83*   CREATININE  1.1   < >  2.6*   CALCIUM  7.9*   < >  9.0   MG  1.9   --    --     < > = values in this interval not displayed.     CBC:   Recent Labs   Lab  09/19/18   0447  09/19/18   0849  09/19/18   1526   WBC  28.19*  32.67*  26.70*   HGB  9.9*  9.9*  10.8*   HCT  30.7*  31.0*  32.9*    PLT  228  204  187     Cardiac Markers: No results for input(s): CKMB, MYOGLOBIN, BNP, TROPISTAT in the last 48 hours.  Lactic Acid:   Recent Labs   Lab  09/17/18 2147   LACTATE  1.3     FENa = 0.28%    Significant Imaging: Abd XR w/o evidence of free air of flat & erect     EGD:  Impression:   - LA Grade C reflux esophagitis.                        - Normal gastric body.                        - Gastric ulcers with a clean ulcer base (Juan Carlos                         Class III).                        - Gastritis.                        - Duodenitis.                        - One duodenal ulcer with two areas of nonbleeding                         visible vessel (Juan Carlos Class IIa). Ulcer is very                         deep. Treated with bipolar cautery. Defect occured                         following treatment and unable to determine if this                         was transmural. Unable to close defect due to                         fibrosis and inflamed bowel wall. High-risk ulcer                         for perforation.                        - Acquired duodenal stenosis.    Assessment/Plan:      * Acute blood loss anemia    Ms. Perez is a pleasant 84 yo lady w/ 9/5 ischemic CVA 2/2 to suspected embolic event, 9/8 mechanical fall 2/2 to residual neurologic deficit c/b trimalleolar L ankle fracture s/p ORIF who presented to ED 9/17 pm w/ 3d h/o abdominal pain and found to have a Juan Carlos Grade IIa duodenal ulcer w/ 2x visible vessels & concern for possible perforation now s/p EGD.  Hgb has been stable since transfusion 9/19 but she has continued abdominal pain that is requiring close monitoring for possible perforation with 2/2 sepsis which is the likely  of her ARF.  Given multiple physiologic insults recently, suspect her duodenal ulcer is 2/2 to stress.  -Hgb has been stable over last 24 & she had an appropriate response to 2 U PRBC transfusion  -Continue IV protonix 40 mg for total 72 hours s/p  EGD  -qd CBC  -Continue NPO w/ NGT set to continuous suction  -Continue serial abdominal exams  -Surgery closely following, assistance appreciated!        Upper GI bleed    -Continue holding home aspirin  -Continue holding home BB        Sepsis    Patient changed from cipro & flagyl to Zosyn today w/ good response w/ lessening leukocytosis.  -Continue Zosyn 4.5 g IV BID (renally dosed)  -BCx show NGTD        Acute renal failure    Patient had little response to 1 L LR bolus but her Hgb did not show any evidence of dilution either on repeat afternoon CBC, johnnie indicating she has continued volume losses.  Her Andrew = 0.28% & in the setting of sepsis, most likely 2/2 to pre-renal volume contraction.  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

## 2018-09-20 NOTE — HPI
Ms. Perez is a 86 yo female w/ complicated recent medical history -- per chart review and primary team's note, patient on 9/5 had an ischemic CVA 2/2 to suspected embolic event, 9/8 mechanical fall 2/2 to residual neurologic deficit c/b trimalleolar L ankle fracture s/p ORIF who presented to ED 9/17 pm w/ 3d h/o abdominal pain and found to have a Juan Carlos Grade IIa duodenal ulcer w/ 2x visible vessels & concern for possible perforation now s/p EGD. CT scan now concerning for RUQ perforation.    Nephrology consulted for JAMES and poor urine output after lasix 120 IV x1 administered. Patient presented with BUN/Cr of 71/1.2, worsening now to 84/3.3 with electrolyte abnormalities: hyperkalemia and hyperphosphatemia. ABG showed pH 7.115, CO2 53.9, HCO3 17.3. Urine output unmeasured over the last 24 hours.

## 2018-09-20 NOTE — PLAN OF CARE
Palliative Care Social Work   Assessment  Name: Mitzy Perez  MRN: [id number]  Date of Birth/Age:  1933  Sex: female  Ethnicity: White    Primary Language:English   Needed: no    Attending Physician: Dr. Sandoval  Reason for Referral: psychosocial support  Consult Order Date: 18  Primary CM/SW: Aida Vickers RN    Palliative Care Provider: CONCHITA Bermudez    Present during Interview: dtr, granddaughters, god child, extended family    Past & Current Medical Situation:   Diagnosis: Acute blood loss anemia    PMH:   Past Medical History:   Diagnosis Date    Arthritis     Cataract     Essential hypertension 2018    Macular degeneration      Mental Health/Substance Use History: n/a  Non-traditional Health practices:     Understanding of diagnosis and prognosis: Have good understanding of dx and px.     Patients Mental Status: Actively dying, per IM    Socio-Economic Factors/Resources:  Address: [address] [place] [address]  Phone Number: [phone] (home) [phone] (work)    Marital Status:   Household Composition: Lives with dtr and granddaughter  Children: dtr Radha  Relationships with Family: Supportive family and extended family. Lives with dtr and granddaughter.     Granddaughter Ashley is an  at [employer].     Emergency Contacts:   Dtr: Radha Rutledge: [phone]  Granddaughter: Ashley Darien: [phone]  Granddaughter: April Patricia: [phone]    Activities of Daily Living: Assistance with ADLs  Support Systems-Family & Community (Home Health, HME etc): [facility]. Rolling walker and wheelchair at home.     Transportation:  Relied on others. pt blind    Work/Education History: pt retired   History: no    Financial Resources:Medicare A. BCBS Federal      Advanced Care Planning & Legal Concerns:   Advanced Directives/Living Will: yes Reviewed in [facility]   Planning:  no    Power of : Markos Garcia.   Surrogate Decision  Maker:       Spirituality, Culture & Coping Mechanisms:  F- Christine and Belief: Sabianist   I - Importance: Has christine    C - Community/Culture Values:     A - Address in Care: shannan Beckman at bedside providing support      Strengths/Coping Strategies: family present and supportive  Self-Care Activities/Hobbies: Enjoys family    Goals/Hopes/Expectations: Peaceful passing.  Fears/Anxiety/Concerns: Doesn't want her to be uncomfortable.         Preferences about EOL Environment: (own bed, family nearby, pets, music, etc)      Complicated Bereavement Risk Assessment Tool (CBRAT)  Reference:  [facility] Clinical Practice Group (May 2016). Bereavement Risk Screening and Management Guidelines.  Retrieved from: http://[email or web address] [email or web address].au/wp-content/uploads//ZOBNL-Ondwyadgfsi-Ugsxqwfuw-and-Management-Guideline-2016.pdf      Client Characteristics (Bereaved Client)  ? Under 18      no  ? Was a Twin   no  ? Young Spouse   no  ? Elderly Spouse    no  ? Isolated    no  ? Lacks Meaningful Social Support   no  ? Dissatisfied with help available during illness   no  ? New to Financial [place] no  ? New to Decision-Making   no   History of Loss (Bereaved Client)  ? Cumulative Multiple Losses   no  ? Previous Mental Health Illnesses   no  ? Current Mental Health Illness   no  ? Other Significant Health Issues   no   ? Migrant/Refugee   no    Illness  ? Inherited Disorder   no  ? Stigmatized Disease in the   no  ?  Family/Community   no  ? Lengthy/Burdensome   no Relationship with   ? Profound Lifelong Partner   no  ? Highly Dependent    no  ? Antagonistic   no  ? Ambivalent   no  ? Deeply Connected   yes  ? Culturally Defined   no   Death  ? Sudden or Unexpected   yes  ? Traumatic Circumstances Associated with Death   no  ? Significant Cultural/Social Burdens as a result of Death   no   Risk Factors Scores  0-2  Low  3-5  Moderate  5+  High  All persons scoring moderate  to high presume to be at risk**    (** It is acknowledged that protective factors and resilience may outweigh apparent risk factors.      Total Risk Factors Score:   Mild to Moderate    Will benefit from Bereavement Follow up. Will discuss with pt's dtr when appropriate.       Discharge Planning Needs/Plan of Care:     Visit made to bedside. Pt's daughter Radha, grandchildren, godchildren and extended family at bedside. Family accepting of pt's current clinical decline and transition to comfort measures. Family vigiling at bedside. Appropriately grieving.  Emotional Support provided to pt's family.     Per IM, pt expected to pass in next hours to days. Not appropriate for hospice discussion at this time.  If this becomes appropriate, Palliative Care to assist.    If pt passes in hospital, [facility]reavement Program to assist with resources and support.       Will continue to follow as appropriate.         Jovanna Costa, JEEVAN, ACHP-SW

## 2018-09-20 NOTE — ASSESSMENT & PLAN NOTE
85 year old female with complicated recent medical history and multi-organ failure. Nephrology consulted for ART after minimal response to lasix 120 IV x1 given by primary team.    -patient is not a dialysis candidate, not even temporary given current medical issues (CT scan showing non-contained perforation in RUQ)  -discussed with family at bedside, will not pursue dialysis and they wish to transition patient to comfort care  -recommend sodium bicarb to maintain pH > 7.2  -recommend shifting for hyperkalemia   -nephrology will sign off

## 2018-09-20 NOTE — ASSESSMENT & PLAN NOTE
85 year old female with a history of HTN and recent CVA with a large duodenal ulcer with visible vessel on EGD yesterday. No further evidence of bleeding however continue abdominal pain with worsening leucocytosis. Possible perforation could account for symptoms however repeat abdominal plain films have been unremarkable. Apart from recommendations below would recommend rule out any concurrent PNA, UTI, etc (in order words concurrent infection on top of possible perforation).     Recommendations:  --NPO with NGT in place  --Serial abdominal exam  --PPI gtt for 72 hours  --Antibiotics  --General surgery recs appreciated

## 2018-09-20 NOTE — ASSESSMENT & PLAN NOTE
-Continue holding home Coreg 3.125 mg po BID  -Continue holding home hydralazine po  -Continue holding hold lisinopril 40 mg po qd

## 2018-09-20 NOTE — ASSESSMENT & PLAN NOTE
Patient changed from cipro & flagyl to Zosyn today w/ good response w/ lessening leukocytosis.  -Continue Zosyn 4.5 g IV BID (renally dosed)  -BCx show NGTD

## 2018-09-20 NOTE — ASSESSMENT & PLAN NOTE
Impression: Pt is an 85 year old female with a history of HTN and recent CVA with a large duodenal ulcer with visible vessel on EGD with likely perforations. Pt is on comfort care at this time. No distress noted at this time with pt. Pt is not responsive to voice.     Goals of care: Met with pt's daughter, two grand-daughters, god-child, and god-child's daughter. Per family, goal is comfort care. Family waiting for extended family to visit. Support given/ visiting.     Comfort care meds:    Pt on Morphine 2 mg IVP q 4hrs prn pain.   Pt would benefit from Morphine 1mg IVP q 30 minutes prn pain/dyspnea.     Pt on Ativan 0.5 mg oral q 30 minutes prn agitation.   Would change route to IVP due to pt's mental acuity.    Secretions:  Scopolamine patch just placed.   Would add glycopyrrolate 0.2 mg qid prn secretions.  Patch takes about 8 hours to be effective.     Called and spoke to Dr. Daily with IM. Per jenny CHOPRA for LUAN to enter above orders.     Pal care will continue to follow.   Pt on comfort care.

## 2018-09-20 NOTE — HPI
Pt is an 85 year old female with a history of HTN and recent CVA admitted with GI bleed in the setting of coffee ground emesis and melena.     History provided by daughter Per chart review:   Daughter reported that for the last couple of days patient has been having issues with abdominal pain and her bowel movements.   On Thursday and Saturday daughter reported 2 tarry blood movements (look like black volcano lava). On Saturday she also began to complain of epigastric pain which was sharp/constant/non-radiating in nature. On Sunday she continued to complain of epigastric pain and had 2 more BM however daughter is unsure if they were tarry as she did not see the bowel movements.   Then yesterday she had an episode of coffee ground emesis. The family took a picture of the emesis and it was indeed coffee ground emesis.   Upon presenting to the ED she was HD stable but hemoglobin was down trending (had also been checked at the SNF and it also down trending) with a black BM documented here at [facility].   She also had a CT abdomen which showed:  --Bowel wall thickening within duodenum, proximal jejunum, and ascending colon with mild mesenteric stranding and small volume ascites.    --Findings are favored to represent a nonspecific enterocolitis which may be due to inflammatory, infectious, or ischemic etiology.     Of note patient had an ischemic stroke on 9/5 and a left ankle fracture on 9/8. Furthermore she has been on ASA and Lovenox at her SNF with last dose yesterday (unsure if she was on a PPI). No prior EGD/colonoscopy and no prior episodes similar to this one.    At this time, pt is a DNR, on comfort care.

## 2018-09-21 NOTE — SUBJECTIVE & OBJECTIVE
Interval History: Pt went for CT abdomen to discover duodenal perforation  We had an extensive discussion with family, decision was made to pursue palliative care.  Comfort measures were taken.    Review of Systems   Constitutional: Positive for fatigue. Negative for chills and fever.   Respiratory: Negative for cough and shortness of breath.    Cardiovascular: Negative for chest pain and palpitations.   Gastrointestinal: Positive for abdominal distention, abdominal pain and nausea. Negative for constipation, diarrhea and vomiting.     Objective:     Vital Signs (Most Recent):  Temp: 96.1 °F (35.6 °C) (09/20/18 2103)  Pulse: (!) 42 (09/20/18 2103)  Resp: 12 (09/20/18 2103)  BP: (!) 78/35 (09/20/18 2103)  SpO2: (!) 91 % (09/20/18 2103) Vital Signs (24h Range):  Temp:  [95.3 °F (35.2 °C)-96.1 °F (35.6 °C)] 96.1 °F (35.6 °C)  Pulse:  [42-76] 42  Resp:  [12-24] 12  SpO2:  [91 %-97 %] 91 %  BP: ()/(35-58) 78/35     Weight: 80.7 kg (178 lb)  Body mass index is 38.52 kg/m².    Intake/Output Summary (Last 24 hours) at 9/20/2018 2305  Last data filed at 9/20/2018 0201  Gross per 24 hour   Intake 1900 ml   Output --   Net 1900 ml      Physical Exam   Constitutional: No distress.   somnolent   Cardiovascular: Normal rate, regular rhythm and intact distal pulses. Exam reveals no gallop and no friction rub.   No murmur heard.  Pulmonary/Chest: No respiratory distress. She has decreased breath sounds. She has no wheezes. She has no rales.   Supplemental oxygen in place   Abdominal: Soft. She exhibits distension. Bowel sounds are decreased. There is generalized tenderness (tender to light palpation). There is no rigidity and no guarding.   Neurological: She is alert.       Significant Labs:   CBC:   Recent Labs   Lab  09/19/18   0849  09/19/18   1526  09/20/18   0545   WBC  32.67*  26.70*  29.70*   HGB  9.9*  10.8*  10.7*   HCT  31.0*  32.9*  37.0   PLT  204  187  308     CMP:   Recent Labs   Lab  09/19/18   1526  09/19/18    2348  09/20/18   0545   NA  141  143  141   K  5.6*  5.8*  6.9*   CL  114*  116*  119*   CO2  17*  18*  10*   GLU  89  72  40*   BUN  83*  85*  84*   CREATININE  2.6*  3.0*  3.3*   CALCIUM  9.0  8.5*  8.2*   PROT   --    --   6.2   ALBUMIN   --    --   2.1*   BILITOT   --    --   0.9   ALKPHOS   --    --   140*   AST   --    --   307*   ALT   --    --   125*   ANIONGAP  10  9  12   EGFRNONAA  16.2*  13.6*  12.2*

## 2018-09-21 NOTE — ANESTHESIA POSTPROCEDURE EVALUATION
"Anesthesia Post Evaluation    Patient: Mitzy Perez    Procedure(s) Performed: Procedure(s) (LRB):  EGD (ESOPHAGOGASTRODUODENOSCOPY) (N/A)    Final Anesthesia Type: general  Patient location during evaluation: PACU  Patient participation: Yes- Able to Participate  Level of consciousness: awake and alert and oriented  Pain management: adequate  Airway patency: patent  PONV status at discharge: No PONV  Anesthetic complications: no      Cardiovascular status: blood pressure returned to baseline, hemodynamically stable and hypotensive  Respiratory status: unassisted  Hydration status: euvolemic  Follow-up not needed.        Visit Vitals  BP (!) 78/35 (BP Location: Left arm, Patient Position: Lying)   Pulse (!) 42   Temp 35.6 °C (96.1 °F) (Oral)   Resp 12   Ht 4' 9" (1.448 m)   Wt 80.7 kg (178 lb)   SpO2 (!) 91%   Breastfeeding? No   BMI 38.52 kg/m²       Pain/Belinda Score: Pain Assessment Performed: Yes (9/20/2018  9:03 PM)  Presence of Pain: non-verbal indicators absent (9/20/2018  9:03 PM)  Pain Rating Prior to Med Admin: 0 (9/20/2018  4:28 PM)        "

## 2018-09-21 NOTE — ASSESSMENT & PLAN NOTE
Ms. 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  Hgb has been stable since transfusion 9/19 but she has continued abdominal pain that is requiring close monitoring for possible perforation with 2/2 sepsis which is the likely  of her ARF.  Given multiple physiologic insults recently, suspect her duodenal ulcer is 2/2 to stress.  -Hgb has been stable over last 24 & she had an appropriate response to 2 U PRBC transfusion  -Continue IV protonix 40 mg for total 72 hours s/p EGD  -qd CBC  -Continue NPO w/ NGT set to continuous suction  -Continue serial abdominal exams  -Surgery closely following, assistance appreciated!    Comfort measures taken

## 2018-09-21 NOTE — PROGRESS NOTES
[facility] Hospital Medicine  Progress Note    Patient Name: Mitzy Perez  MRN: [id number]  Patient Class: IP- Inpatient   Admission Date: 9/17/2018  Length of Stay: 2 days  Attending Physician: Flores Sandoval MD  Primary Care Provider: Marta Michael MD    [facility] Medicine Team: [facility] HOSP MED 3 Félix Glass MD    Subjective:     Principal Problem:Acute blood loss anemia    HPI:  Mrs. Mitzy Perez is an 85 year old female with a PMHx of HTN, macular degeneration, recent trimalleolar ankle fracture and recent CVA (admitted 9/5-9/7) presenting from SNF for a new onset of coffee ground emesis. Per her family, the patient developed abdominal pain Saturday (3 days ago) which was thought to be due to gas and constipation. She describes the pain as epigastric location which was sharp, constant, non-radating, 7/10 in intensity. It was associated with severe fatigue, decreased appetite and abdominal distension. She had not had a bowel movement since Saturday so the SNF had been giving the patient stool softeners. This AM, the patient had 1 episode of coffee ground emesis. A cbc was obtained at the SNF showing a hemoglobin of 10.9 (baseline 12). She was sent to the ED for evaluation. She denies denies chest pain, shortness of breath, and lightheadedness. Her only anticoagulation at the SNF was aspirin 81mg and Lovenox 40mg daily.    Of note, the patient recently had a CVA where she was admitted to the hospital. She was discharged home and within a few hours of being at home, the patient had a mechanical fall resulting in a Trimalleolar fracture requiringsurgical repair. She was discharged to a SNF where her family reports she has been participating with therapy but falls asleep shortly after her sessions.     In the ED, the patient received 1L bolus and a CT abdomen was performed showing Bowel wall thickening within duodenum, proximal jejunum, and ascending colon with mild mesenteric stranding  and small volume ascites suggestive of enterocolitis. She was started on ceftriaxone and flagyl. Her hemoglobin was found to be 8.5. She has not had any further episodes of emesis. She has had 1 BM in the ED which was black in color.        Hospital Course:  Patient admitted to [facility] on 9/18 for acute blood loss anemia 2/2 to upper GIB.  She responded well to 2 U PRBC w/ Hgb increase from 5.7 to >10.  On EGD she was found to have a large duodenal ulcer withj concern for possible perforation.  General surgery was consulted on 9/18 and per their assessment she was more likely to have a posterior perforation which would most likely be managed non-operatively.  That afternoon she was also placed on IV cipro & flagyl to cover for gram negative and anaerobic infections.  Despite empiric coverage her sepsis presented itself overnight with a doubling of her white count by the following day on 9/19.  She was switched to Zosyn with improvement in her sepsis noted with decreased lekocytosis.  Her sepsis on 9/18 was complicated by her becoming functionally anephric w/ increased SrCr at 2.0 9/19 am from 1.1 on admission.  Despite 1 L LR bolus her SrCr worsened to 2.6 by 9/19 afternoon now c/b hyperkalemia at 5.6 from 5.0 the day before.    Interval History: Pt went for CT abdomen to discover duodenal perforation  We had an extensive discussion with family, decision was made to pursue palliative care.  Comfort measures were taken.    Review of Systems   Constitutional: Positive for fatigue. Negative for chills and fever.   Respiratory: Negative for cough and shortness of breath.    Cardiovascular: Negative for chest pain and palpitations.   Gastrointestinal: Positive for abdominal distention, abdominal pain and nausea. Negative for constipation, diarrhea and vomiting.     Objective:     Vital Signs (Most Recent):  Temp: 96.1 °F (35.6 °C) (09/20/18 2103)  Pulse: (!) 42 (09/20/18 2103)  Resp: 12 (09/20/18 2103)  BP: (!) 78/35  (09/20/18 2103)  SpO2: (!) 91 % (09/20/18 2103) Vital Signs (24h Range):  Temp:  [95.3 °F (35.2 °C)-96.1 °F (35.6 °C)] 96.1 °F (35.6 °C)  Pulse:  [42-76] 42  Resp:  [12-24] 12  SpO2:  [91 %-97 %] 91 %  BP: ()/(35-58) 78/35     Weight: 80.7 kg (178 lb)  Body mass index is 38.52 kg/m².    Intake/Output Summary (Last 24 hours) at 9/20/2018 2305  Last data filed at 9/20/2018 0201  Gross per 24 hour   Intake 1900 ml   Output --   Net 1900 ml      Physical Exam   Constitutional: No distress.   somnolent   Cardiovascular: Normal rate, regular rhythm and intact distal pulses. Exam reveals no gallop and no friction rub.   No murmur heard.  Pulmonary/Chest: No respiratory distress. She has decreased breath sounds. She has no wheezes. She has no rales.   Supplemental oxygen in place   Abdominal: Soft. She exhibits distension. Bowel sounds are decreased. There is generalized tenderness (tender to light palpation). There is no rigidity and no guarding.   Neurological: She is alert.       Significant Labs:   CBC:   Recent Labs   Lab  09/19/18   0849  09/19/18   1526  09/20/18   0545   WBC  32.67*  26.70*  29.70*   HGB  9.9*  10.8*  10.7*   HCT  31.0*  32.9*  37.0   PLT  204  187  308     CMP:   Recent Labs   Lab  09/19/18   1526  09/19/18   2348  09/20/18   0545   NA  141  143  141   K  5.6*  5.8*  6.9*   CL  114*  116*  119*   CO2  17*  18*  10*   GLU  89  72  40*   BUN  83*  85*  84*   CREATININE  2.6*  3.0*  3.3*   CALCIUM  9.0  8.5*  8.2*   PROT   --    --   6.2   ALBUMIN   --    --   2.1*   BILITOT   --    --   0.9   ALKPHOS   --    --   140*   AST   --    --   307*   ALT   --    --   125*   ANIONGAP  10  9  12   EGFRNONAA  16.2*  13.6*  12.2*           Assessment/Plan:      * Acute blood loss anemia    Ms. 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  Hgb has been stable since transfusion 9/19 but she has continued abdominal pain that is requiring close monitoring for possible perforation with 2/2 sepsis which is the likely  of her ARF.  Given multiple physiologic insults recently, suspect her duodenal ulcer is 2/2 to stress.  -Hgb has been stable over last 24 & she had an appropriate response to 2 U PRBC transfusion  -Continue IV protonix 40 mg for total 72 hours s/p EGD  -qd CBC  -Continue NPO w/ NGT set to continuous suction  -Continue serial abdominal exams  -Surgery closely following, assistance appreciated!    Comfort measures taken        Sepsis    Patient changed from cipro & flagyl to Zosyn today w/ good response w/ lessening leukocytosis.  -Continue Zosyn 4.5 g IV BID (renally dosed)  -BCx show NGTD  - CT abdo showed perforation    Comfort measures taken        Peptic ulcer disease with hemorrhage    -Continue holding home aspirin  -Continue holding home BB  - CT abdo showed perforation    Comfort measures taken        Discharge planning issues    Discharge pending clinical improvement        Acute renal failure    Patient had little response to 1 L LR bolus but her Hgb did not show any evidence of dilution either on repeat afternoon CBC, johnnie indicating she has continued volume losses.  Her Andrew = 0.28% & in the setting of sepsis, most likely 2/2 to pre-renal volume contraction.  -1L NS bolus w/ 150 cc/hr x 10hr  -23:00 BMP to monitor renal function        Obesities, morbid    -Patient currently NPO w/ NGT gastric decompression        Closed trimalleolar fracture of left ankle with routine healing    -Continue PT/OT as tolerated        Mixed hyperlipidemia    -Continue holding home statin right now while connected to NGT w/ suction        Essential hypertension    -Continue holding home Coreg 3.125 mg  po BID  -Continue holding home hydralazine po  -Continue holding hold lisinopril 40 mg po qd        H/O ischemic multifocal multiple vascular territories stroke 9/2018    -Continue holding home aspirin          VTE Risk Mitigation (From admission, onward)        Ordered     IP VTE HIGH RISK PATIENT  Once      09/18/18 0206              Félix Glass MD  Department of Hospital Medicine   [facility]

## 2018-09-21 NOTE — CARE UPDATE
Death Note    Called to bedside by patient's nurse. Nursing supervisor notified. Family at bedside.  has been called and is also at bedside.    Patient is not responding to verbal or tactile stimuli. Patient does not have a papillary or corneal reflex. His pupils are fixed and dilated. No heart or breath sounds on auscultation. No respirations. No palpable pulses.     Time of death:  09/21/2018 12:45 AM    Cause of Death:  GI perforation     Ramonita Orta MD  Internal Medicine, PGY-1

## 2018-09-21 NOTE — NURSING
Notified by family member at 00:15 that patient appeared to not be breathing and there was noticeable skin color changes. Assessed carotid pulse, breath sounds, and heart sounds. None noted upon assessment. Continuous morphine drip stopped, MD notified, and ruslan notified.

## 2018-09-21 NOTE — ASSESSMENT & PLAN NOTE
Patient changed from cipro & flagyl to Zosyn today w/ good response w/ lessening leukocytosis.  -Continue Zosyn 4.5 g IV BID (renally dosed)  -BCx show NGTD  - CT abdo showed perforation    Comfort measures taken

## 2018-09-21 NOTE — ASSESSMENT & PLAN NOTE
-Continue holding home aspirin  -Continue holding home BB  - CT abdo showed perforation    Comfort measures taken

## 2018-09-21 NOTE — ANESTHESIA POSTPROCEDURE EVALUATION
"Anesthesia Post Evaluation    Patient: Mitzy Perez    Procedure(s) Performed: Procedure(s) (LRB):  EGD (ESOPHAGOGASTRODUODENOSCOPY) (N/A)    [facility] Anesthesia Post Op Evaluation    Visit Vitals  BP (!) 78/35 (BP Location: Left arm, Patient Position: Lying)   Pulse (!) 42   Temp 35.6 °C (96.1 °F) (Oral)   Resp 12   Ht 4' 9" (1.448 m)   Wt 80.7 kg (178 lb)   SpO2 (!) 91%   Breastfeeding? No   BMI 38.52 kg/m²       Pain/Belinda Score: Pain Assessment Performed: Yes (9/20/2018  9:03 PM)  Presence of Pain: non-verbal indicators absent (9/20/2018  9:03 PM)  Pain Rating Prior to Med Admin: 0 (9/20/2018  4:28 PM)        "

## 2018-09-22 LAB — BACTERIA UR CULT: NORMAL

## 2018-09-22 NOTE — ASSESSMENT & PLAN NOTE
Patient had little response to 1 L LR bolus but her Hgb did not show any evidence of dilution either on repeat afternoon CBC, johnnie indicating she has continued volume losses.  Her Andrew = 0.28% & in the setting of sepsis, most likely 2/2 to pre-renal volume contraction.  -1L NS bolus w/ 150 cc/hr x 10hr  -23:00 BMP to monitor renal function  Comfort measures taken

## 2018-09-22 NOTE — DISCHARGE SUMMARY
[facility] Hospital Medicine  Discharge Summary      Patient Name: Mitzy Perez  MRN: [id number]  Admission Date: 9/17/2018  Hospital Length of Stay: 3 days  Discharge Date and Time:  09/21/2018 9:37 PM  Attending Physician: Flores Sandoval MD  Discharging Provider: Félix Glass MD  Primary Care Provider: Marta Michael MD  [facility] Medicine Team: [facility]OS[facility] MED 3 Félix Glass MD    HPI:   Mrs. Mitzy Perez is an 85 year old female with a PMHx of HTN, macular degeneration, recent trimalleolar ankle fracture and recent CVA (admitted 9/5-9/7) presenting from SNF for a new onset of coffee ground emesis. Per her family, the patient developed abdominal pain Saturday (3 days ago) which was thought to be due to gas and constipation. She describes the pain as epigastric location which was sharp, constant, non-radating, 7/10 in intensity. It was associated with severe fatigue, decreased appetite and abdominal distension. She had not had a bowel movement since Saturday so the SNF had been giving the patient stool softeners. This AM, the patient had 1 episode of coffee ground emesis. A cbc was obtained at the SNF showing a hemoglobin of 10.9 (baseline 12). She was sent to the ED for evaluation. She denies denies chest pain, shortness of breath, and lightheadedness. Her only anticoagulation at the SNF was aspirin 81mg and Lovenox 40mg daily.    Of note, the patient recently had a CVA where she was admitted to the hospital. She was discharged home and within a few hours of being at home, the patient had a mechanical fall resulting in a Trimalleolar fracture requiringsurgical repair. She was discharged to a SNF where her family reports she has been participating with therapy but falls asleep shortly after her sessions.     In the ED, the patient received 1L bolus and a CT abdomen was performed showing Bowel wall thickening within duodenum, proximal jejunum, and ascending colon with mild mesenteric  stranding and small volume ascites suggestive of enterocolitis. She was started on ceftriaxone and flagyl. Her hemoglobin was found to be 8.5. She has not had any further episodes of emesis. She has had 1 BM in the ED which was black in color.        Procedure(s) (LRB):  EGD (ESOPHAGOGASTRODUODENOSCOPY) (N/A)      Hospital Course:   Patient admitted to [facility] on 9/18 for acute blood loss anemia 2/2 to upper GIB.  She responded well to 2 U PRBC w/ Hgb increase from 5.7 to >10.  On EGD she was found to have a large duodenal ulcer with concern for possible perforation.  General surgery was consulted on 9/18 and per their assessment she was more likely to have a posterior perforation which would most likely be managed non-operatively.  That afternoon she was also placed on IV cipro & flagyl to cover for gram negative and anaerobic infections.  Despite empiric coverage her sepsis presented itself overnight with a doubling of her white count by the following day on 9/19.  She was switched to Zosyn with improvement in her sepsis noted with decreased lekocytosis.  Her sepsis on 9/18 was complicated by her becoming functionally anephric w/ increased SrCr at 2.0 9/19 am from 1.1 on admission.  Despite 1 L LR bolus her SrCr worsened to 2.6 by 9/19 afternoon c/b hyperkalemia at 5.6 from 5.0 the day before.  CT abdomen confirmed the suspected perforation.  After extensive conversation with the patient's family, the decision was made to pursue comfort care.  The patient passed away 9/21/2018 12:45 AM.         Consults:   Consults (From admission, onward)        Status Ordering Provider     Inpatient consult to Gastroenterology  Once     Provider:  (Not yet assigned)    Completed REBEKAH EL     Inpatient consult to General Surgery  Once     Provider:  (Not yet assigned)    Completed MODESTA HINSON     Inpatient consult to Nephrology  Once     Provider:  (Not yet assigned)    Completed MARIO PERALTA      Inpatient consult to Palliative Care  Once     Provider:  (Not yet assigned)    Completed GINNA KRUSE          * Acute blood loss anemia    Ms. Perez is a pleasant 84 yo lady w/ 9/5 ischemic CVA 2/2 to suspected embolic event, 9/8 mechanical fall 2/2 to residual neurologic deficit c/b trimalleolar L ankle fracture s/p ORIF who presented to ED 9/17 pm w/ 3d h/o abdominal pain and found to have a Juan Carlos Grade IIa duodenal ulcer w/ 2x visible vessels & concern for possible perforation now s/p EGD.  Hgb has been stable since transfusion 9/19 but she has continued abdominal pain that is requiring close monitoring for possible perforation with 2/2 sepsis which is the likely  of her ARF.  Given multiple physiologic insults recently, suspect her duodenal ulcer is 2/2 to stress.  -Hgb has been stable over last 24 & she had an appropriate response to 2 U PRBC transfusion  -Continue IV protonix 40 mg for total 72 hours s/p EGD  -qd CBC  -Continue NPO w/ NGT set to continuous suction  -Continue serial abdominal exams  -Surgery closely following, assistance appreciated!    Comfort measures taken        Comfort measures only status    ny unnecessary devices were removed, lab draws were ceased, and morphine was provided to alleviate pain.  The patient passed away 9/21/2018 12:45 AM.          Sepsis    Patient changed from cipro & flagyl to Zosyn today w/ good response w/ lessening leukocytosis.  -Continue Zosyn 4.5 g IV BID (renally dosed)  -BCx show NGTD  - CT abdo showed perforation    Comfort measures taken        Peptic ulcer disease with hemorrhage    -Continue holding home aspirin  -Continue holding home BB  - CT abdo showed perforation    Comfort measures taken        Epigastric pain              Enterocolitis              Diverticulosis of large intestine with hemorrhage              Atherosclerosis of aorta              Pain    Comfort measures taken          Goals of care, counseling/discussion     Comfort measures taken          Palliative care encounter    Comfort measures taken          Duodenal ulcer              Upper GI bleed              Discharge planning issues    Comfort measures taken        Acute renal failure    Patient had little response to 1 L LR bolus but her Hgb did not show any evidence of dilution either on repeat afternoon CBC, johnnie indicating she has continued volume losses.  Her Andrew = 0.28% & in the setting of sepsis, most likely 2/2 to pre-renal volume contraction.  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/2018    -Continue holding home aspirin          Final Active Diagnoses:    Diagnosis Date Noted POA    PRINCIPAL PROBLEM:  Acute blood loss anemia [D62] 09/18/2018 Yes    Comfort measures only status [Z51.5] 09/20/2018 Not Applicable    Sepsis [A41.9] 09/19/2018 No    Peptic ulcer disease with hemorrhage [K27.4] 09/18/2018 Yes    Epigastric pain [R10.13]  Yes    Palliative care encounter [Z51.5] 09/20/2018 Not Applicable    Upper GI bleed [K92.2]  Yes    Duodenal ulcer [K26.9]  Yes    Goals of care, counseling/discussion [Z71.89]  Not Applicable    Pain [R52]  Yes    Atherosclerosis of aorta [I70.0]  Yes    Diverticulosis of large intestine with hemorrhage [K57.31]  Yes    Enterocolitis [K52.9]  Yes    Acute renal failure [N17.9] 09/19/2018 No    Discharge planning issues [Z02.9] 09/19/2018 Not  Applicable    Obesities, morbid [E66.01] 2018 Yes    Closed trimalleolar fracture of left ankle with routine healing [S82.852D] 2018 Not Applicable    Mixed hyperlipidemia [E78.2] 2018 Yes    Essential hypertension [I10] 2018 Yes    H/O ischemic multifocal multiple vascular territories stroke 2018 [Z86.73] 2018 Not Applicable      Problems Resolved During this Admission:       Discharged Condition:     Disposition:     Follow Up:  Follow-up Information     Marta Michael MD.    Specialty:  Internal Medicine  Contact information:  [address]  [facility] [address]  [phone]                 Patient Instructions:   No discharge procedures on file.    Significant Diagnostic Studies: Labs:   CMP   Recent Labs   Lab  18   2348  18   0545   NA  143  141   K  5.8*  6.9*   CL  116*  119*   CO2  18*  10*   GLU  72  40*   BUN  85*  84*   CREATININE  3.0*  3.3*   CALCIUM  8.5*  8.2*   PROT   --   6.2   ALBUMIN   --   2.1*   BILITOT   --   0.9   ALKPHOS   --   140*   AST   --   307*   ALT   --   125*   ANIONGAP  9  12   ESTGFRAFRICA  15.7*  14.0*   EGFRNONAA  13.6*  12.2*   , CBC   Recent Labs   Lab  18   0545   WBC  29.70*   HGB  10.7*   HCT  37.0   PLT  308    and INR   Lab Results   Component Value Date    INR 1.1 2018    INR 1.1 2018    INR 1.1 2018 CT Abdo Pelvis:  Pneumoperitoneum and perforation with contrast collecting in the right upper quadrant noting wall thickening and inflammatory changes involving the duodenum and jejunum in this patient with history of recent duodenal ulcer cauterization.  Surgical consultation is advised.       CXR  Heart size is mildly enlarged and calcification in the mitral valve annulus can be seen.  Some pleural fluid and loss of volume is seen in both lung bases.  The degree of loss of volume in the right lung base is a little greater than was 2 days ago.      KUB  FINDINGS:  Supine film the abdomen shows a nasogastric tube present in the body of the stomach no significant intestinal distension is seen.     XR Abdo erect and flat  FINDINGS:  Upright and supine views demonstrated nasogastric tubing which appears unchanged in position within the left upper quadrant and with stable radiographic appearance of the included soft tissues and osseous structures.  There remains overall nonobstructive bowel gas pattern with marked soft tissue attenuation.  Right upper quadrant opacities are again demonstrated and there is contrast collected within the region of the urinary bladder     KUB  Nasogastric tube tip projects over the gastric fundus, possibly just beyond the gastroesophageal junction, however side hole remains above the diaphragm.  Advancement is recommended.  No detrimental change in the bowel gas pattern.     CT abdo Pelvis  Impression  1. Bowel wall thickening within duodenum, proximal jejunum, and ascending colon with mild mesenteric stranding and small volume ascites.  Findings are favored to represent a nonspecific enterocolitis which may be due to inflammatory, infectious, or ischemic etiology.  2. Atherosclerosis of the coronary arteries, aorta, and branch vessels.  Including the celiac axis, SMA and MEMO origins  3. Cholelithiasis without evidence for cholecystitis.  4. Diverticulosis coli.      Medications:  None (patient  at medical facility)    Indwelling Lines/Drains at time of discharge:   Lines/Drains/Airways     Drain                 Urethral Catheter 18 0649 1 day          Epidural Line                 Perineural Analgesia/Anesthesia Assessment (using dermatomes) Epidural 18 0849 10 days                Time spent on the discharge of patient: 180 minutes  Patient was seen and examined on the date of discharge and determined to be suitable for discharge.         Félix Glass MD  Department of Hospital Medicine  [facility]

## 2018-09-22 NOTE — SUBJECTIVE & OBJECTIVE
After CT abdomen confirmed the suspected perforation, extensive conversation with the patient's family occurred and the decision was made to pursue comfort care.  Any unnecessary devices were removed, lab draws were ceased, and morphine was provided to alleviate pain.  The patient passed away 9/21/2018 12:45 AM.

## 2018-09-22 NOTE — ASSESSMENT & PLAN NOTE
ny unnecessary devices were removed, lab draws were ceased, and morphine was provided to alleviate pain.  The patient passed away 9/21/2018 12:45 AM.

## 2018-09-23 LAB
BACTERIA BLD CULT: NORMAL
BACTERIA BLD CULT: NORMAL

## 2018-09-24 NOTE — PHYSICIAN QUERY
"PT Name: Mitzy Perez  MR #: [id number]     Physician Query Form - Documentation Clarification      Idalia Barnes RN, CCDS  Contact Info: [phone]  [email or web address]      This form is a permanent document in the medical record.     Query Date: September 24, 2018    By submitting this query, we are merely seeking further clarification of documentation. Please utilize your independent clinical judgment when addressing the question(s) below.    The Medical record reflects the following:    Supporting Clinical Findings Location in Medical Record   Cause of death:  Perforated duodenal ulcer with secondary sepsis and acute renal failure.      CT abdomen confirmed the suspected perforation.      9/18 for acute blood loss anemia 2/2 to upper GIB.  She responded well to 2 U PRBC w/ Hgb increase from 5.7 to >10.      On EGD she was found to have a large duodenal ulcer with concern for possible perforation.      General surgery was consulted on 9/18 and per their assessment she was more likely to have a posterior perforation which would most likely be managed non-operatively. Discharge Summary                                                                                  Doctor, Please specify diagnosis or diagnoses associated with above clinical findings.       Please specify acuity of "Duodenal Ulcer with perforation".     Provider Use Only    (  ) Acute and chronic    ( X ) Acute    (  ) Chronic    (  ) Other - specify: ____________________                                                                                                             [  ] Clinically undetermined            "

## 2018-09-25 LAB
BACTERIA BLD CULT: NORMAL
BACTERIA BLD CULT: NORMAL

## 2020-04-15 NOTE — PLAN OF CARE
[facility]    FACILITY TRANSFER ORDERS      Patient Name: Mitzy Perze  YOB: 1933    PCP: Marta Michael MD   PCP Address: [address] [facility] [place] [address]  PCP Phone Number: [phone]  PCP Fax: [phone]    Encounter Date: 09/12/2018    Admit to: [facility]    Vital Signs:  Routine    Diagnoses:   Active Hospital Problems    Diagnosis  POA    *Fall [W19.XXXA]  Yes     Priority: 1 - High    Trimalleolar fracture of left ankle [S82.852A]  Yes     Priority: 2     Acute ischemic multifocal multiple vascular territories stroke [I63.8]  Yes     Priority: 2     Essential hypertension [I10]  Yes     Priority: 3     Closed displaced fracture of lateral malleolus of left fibula [S82.62XA]  Yes    Mixed hyperlipidemia [E78.2]  Yes      Resolved Hospital Problems    Diagnosis Date Resolved POA    Left trimalleolar fracture, closed, initial encounter [S83.707D] 09/08/2018 Yes       Allergies:Review of patient's allergies indicates:  No Known Allergies    Diet: cardiac diet    Activities: Activity as tolerated    Labs: Per Facility Protocol    CONSULTS:    Physical Therapy to evaluate and treat. , Occupational Therapy to evaluate and treat. and Speech Therapy to evaluate and treat for Language, Swallowing and Cognition.    MISCELLANEOUS CARE:  - NWB LLE  - Aggressive ice and elevation LLE    WOUND CARE ORDERS  None    Medications: Review discharge medications with patient and family and provide education.      Current Discharge Medication List      START taking these medications    Details   HYDROcodone-acetaminophen (NORCO) 5-325 mg per tablet Take 1 tablet by mouth every 6 (six) hours as needed for Pain.  Qty: 30 tablet, Refills: 0      traMADol (ULTRAM) 50 mg tablet Take 1 tablet (50 mg total) by mouth every 6 (six) hours as needed for Pain (unrelieved by tylenol).         CONTINUE these medications which have NOT CHANGED    Details   carvedilol (COREG) 6.25 MG  tablet Take 1 tablet (6.25 mg total) by mouth 2 (two) times daily.  Qty: 60 tablet, Refills: 11      amLODIPine (NORVASC) 5 MG tablet Take 1 tablet (5 mg total) by mouth once daily.  Qty: 30 tablet, Refills: 11      aspirin (ECOTRIN) 81 MG EC tablet Take 81 mg by mouth every evening.       atorvastatin (LIPITOR) 40 MG tablet Take 1 tablet (40 mg total) by mouth once daily.  Qty: 90 tablet, Refills: 3      BETA-CAROTENE,A, W-C & E/MIN (ICAPS ORAL) Take 1 tablet by mouth once daily.      fish oil-omega-3 fatty acids 300-1,000 mg capsule Take 1 capsule by mouth once daily.       lisinopril (PRINIVIL,ZESTRIL) 20 MG tablet Take 1 tablet (20 mg total) by mouth once daily.  Qty: 90 tablet, Refills: 3                    Aida Gold PA-C  09/12/2018           stated

## 2021-01-13 NOTE — PROGRESS NOTES
6 General Surgery Progress Note:    HPI: Mitzy Perez is a pleasant 85 y.o. woman, who is currently in a nursing home after a stroke 1 mo ago, s/p L ankle fracture repair last week, who is admitted to the medicine team for hematemesis.    She was taken to endoscopy today for UGI bleed.  Hb had dropped 2.5 points yesterday, and responded well to 2 PRBC.    A large ulcer was noted, and stopped with cautery.  We are asked to follow along in case of full thickness defect to the duodenum.    In the recovery room, she has some abdominal pain currently, worse in the RUQ.    Interval History: No acute events overnight, afebrile, vital signs stable. Pain controlled, resting comfortably in bed this morning, continued abdominal tenderness. No output noted from NGT, KUB from this AM confirms placement in stomach. Denies significant nausea or vomiting.     Past Medical History:   Diagnosis Date    Arthritis     Cataract     Essential hypertension 9/5/2018    Macular degeneration        Past Surgical History:   Procedure Laterality Date    CATARACT EXTRACTION  2003    LEFT EYE    FIXATION OF SYNDESMOSIS OF ANKLE Left 9/11/2018    Procedure: FIXATION, SYNDESMOSIS, ANKLE;  Surgeon: Dontae Negro MD;  Location: [facility] OR [address] [place] [address];  Service: Orthopedics;  Laterality: Left;    FIXATION, SYNDESMOSIS, ANKLE Left 9/11/2018    Performed by Dontae Negro MD at [facility] OR [address] [place] [address]    OPEN REDUCTION AND INTERNAL FIXATION (ORIF) OF INJURY OF ANKLE Left 9/11/2018    Procedure: ORIF, ANKLE;  Surgeon: Dontae Negro MD;  Location: [facility] OR [address] [place] [address];  Service: Orthopedics;  Laterality: Left;    ORIF, ANKLE Left 9/11/2018    Performed by Dontae Negro MD at [facility] OR [address] [place] [address]       Family History   Problem Relation Age of Onset    Retinal detachment Sister     Thyroid disease Sister     Amblyopia Neg Hx     Blindness Neg Hx     Cancer Neg Hx     Cataracts Neg Hx     Diabetes Neg Hx     Glaucoma Neg Hx     Hypertension Neg Hx      Macular degeneration Neg Hx     Strabismus Neg Hx     Stroke Neg Hx        Social History     Socioeconomic History    Marital status:      Spouse name: Not on file    Number of children: Not on file    Years of education: Not on file    Highest education level: Not on file   Social Needs    Financial resource strain: Not on file    Food insecurity - worry: Not on file    Food insecurity - inability: Not on file    Transportation needs - medical: Not on file    Transportation needs - non-medical: Not on file   Occupational History    Not on file   Tobacco Use    Smoking status: Never Smoker    Smokeless tobacco: Never Used   Substance and Sexual Activity    Alcohol use: No    Drug use: Not on file    Sexual activity: Not on file   Other Topics Concern    Not on file   Social History Narrative    Not on file       No current facility-administered medications on file prior to encounter.      Current Outpatient Medications on File Prior to Encounter   Medication Sig Dispense Refill    amLODIPine (NORVASC) 5 MG tablet Take 1 tablet (5 mg total) by mouth once daily. 30 tablet 11    aspirin (ECOTRIN) 81 MG EC tablet Take 81 mg by mouth every evening.       atorvastatin (LIPITOR) 40 MG tablet Take 1 tablet (40 mg total) by mouth once daily. 90 tablet 3    carvedilol (COREG) 6.25 MG tablet Take 1 tablet (6.25 mg total) by mouth 2 (two) times daily. 60 tablet 11    lisinopril (PRINIVIL,ZESTRIL) 20 MG tablet Take 1 tablet (20 mg total) by mouth once daily. 90 tablet 3    BETA-CAROTENE,A, W-C & E/MIN (ICAPS ORAL) Take 1 tablet by mouth once daily.      fish oil-omega-3 fatty acids 300-1,000 mg capsule Take 1 capsule by mouth once daily.       HYDROcodone-acetaminophen (NORCO) 5-325 mg per tablet Take 1 tablet by mouth every 6 (six) hours as needed for Pain. 30 tablet 0    traMADol (ULTRAM) 50 mg tablet Take 1 tablet (50 mg total) by mouth every 6 (six) hours as needed for Pain (unrelieved  by tylenol).         Review of patient's allergies indicates:  No Known Allergies    ROS: Constitutional: no fever or chills, pain controlled   Respiratory: no cough or shortness of breath   Cardiovascular: no chest pain or palpitations   Genitourinary: no hematuria or dysuria   Hematologic/Lymphatic: no easy bruising or lymphadenopathy   Musculoskeletal: no arthralgias or myalgias   Neurological: no seizures or tremors     Phys:  Vitals:    09/18/18 1915 09/18/18 1930 09/18/18 2026 09/19/18 0433   BP: (!) 156/63 (!) 144/65 (!) 165/70 (!) 114/54   BP Location:   Right arm    Patient Position:   Lying    Pulse: 68 67 67 (!) 59   Resp: 18 18 16 16   Temp:  98.8 °F (37.1 °C) 98 °F (36.7 °C) 97.3 °F (36.3 °C)   TempSrc:  Temporal Axillary    SpO2: 98% 98% 98% 95%   Weight:       Height:          Phys:   Gen: NAD   HEENT: NCAT, trachea midline  Pulm: Unlabored  Abd: Soft, moderate ttp RUQ. No rebound, guarding.   Extremities: no cyanosis or edema, or clubbing  Skin: Skin color, texture, turgor normal. No rashes or lesions     A/P 85 year old woman, with bleeding duodenal ulcer s/p endoscopic therapy, concern for perforation    Possible perforation:  Agree with NPO, IVF, IV abx  Continue NGT to LIWS  If perforation is present, it would likely be posterior, and there would be a large potential for non operative management    UGI bleed:  Agree with protonix, consider carafate  Serial CBC  Large bore IV access  No coagulopathy is present    Will follow with you    Savannah Crane MD  PGY-2 General Surgery   [phone]

## 2023-01-24 NOTE — HPI
85 year old female with a PMHx of HTN, macular degeneration, and recent CVA (admitted 9/5-9/7) presenting with L ankle pain s/p mechanical fall at home. [place] [facility] [place] for CVA. During that admission, recommendations were for patient to go to inpatient rehab; family opted for patient to return home with [facility] and [facility] care. Pt arrived home ~7pm when she went to the bathroom. Per granddaughter, pt did not call for assistance. While in the bathroom, patient slipped while reaching to put toilet seat down. Pt denies prodromal symptoms, including chest pain, SOB, lightheadedness, vision/speech changes. She denies LOC and head injury. Family reports they heard her fall and came to her aid immediately. EMS called to transport her to hospital. At the time of my exam, pt only endorses minimal pain to LLE. Denies chest pain, SOB, abdominal pain, N/V/D, fever/chills, vision/speech changes.       In the ED, HDS. Imaging revealed displaced fracture of the lateral malleolus with anterior and medial dislocation of the tibia. Ortho surgery reduced and splinted in the ER; rec NWB LLE; plan for outpatient surgery. Patient admitted for rehab placement.  
Mitzy Perez is a 85-year-old female with PMHx of HTN and macular degeneration. Patient presented to [facility] on 9/5/18 with altered mental staus, double vision, and hallucinations. MRI with scattered punctate areas of restricted diffusion. Discharged 9/8/18 and recommending IRF. Family opted for home health and upon return to home with [facility] had fall and was readmitted with L ankle pain from mechanical fall at home. X-ray revealed left trimal ankle fracture with dislocation. Splinted in ER and scheduled for Surgery tomorrow pending swelling.     Functional History: Patient lives with daughter single story home with 0 steps to enter.  Prior to admission, (I) with ADLs and mobility, no driving 2/2 to macular degeneration. DME: none.   
Mitzy Perez is an 85 y.o. female  presenting with left ankle pain after mechanical fall this morning. Patient was walking to the bathroom, she tripped and landed awkwardly on her left leg. She had Immediate pain and difficulty bearing weight. Denies head injury or LOC. Of note, had suspected embolic stroke last week and discharged 2 days ago. Was discharged on aspirin and statin.    Denies other MSK pains.   Denies numbness, tingling, or paresthesias to extremity.  Ambulates with significant difficulty at baseline secondary to significant vision loss.      
[Follow-Up Visit] : a follow-up visit for

## (undated) DEVICE — DRESSING GAUZE 6PLY 4X4

## (undated) DEVICE — SEE MEDLINE ITEM 152622

## (undated) DEVICE — SUT VICRYL PLUS 0 CT1 18IN

## (undated) DEVICE — WIRE-K 20MM X 150MM.
Type: IMPLANTABLE DEVICE | Site: ANKLE | Status: NON-FUNCTIONAL
Removed: 2018-09-11

## (undated) DEVICE — BANDAGE ACE ELASTIC 6"

## (undated) DEVICE — DRAPE C ARM 42 X 120 10/BX

## (undated) DEVICE — PAD CAST SPECIALIST STRL 6

## (undated) DEVICE — DRAPE PLASTIC U 60X72

## (undated) DEVICE — BIT BRILL 2.5

## (undated) DEVICE — TRAY MINOR ORTHO

## (undated) DEVICE — DRESSING N ADH OIL EMUL 3X8

## (undated) DEVICE — GAUZE SPONGE 4X4 12PLY

## (undated) DEVICE — BLADE SURG CARBON STEEL #10

## (undated) DEVICE — SUT 3-0 VICRYL SH CR/8 18

## (undated) DEVICE — CATH SUCTION 10FR

## (undated) DEVICE — SCREW CORTEX 2.7 X 20.
Type: IMPLANTABLE DEVICE | Site: ANKLE | Status: NON-FUNCTIONAL
Removed: 2018-09-11

## (undated) DEVICE — DRAPE STERI U-SHAPED 47X51IN

## (undated) DEVICE — SEE MEDLINE ITEM 157150

## (undated) DEVICE — DRAPE C-ARMOR EQUIPMENT COVER

## (undated) DEVICE — SEE MEDLINE ITEM 146298

## (undated) DEVICE — K-WIRE THRD TRCR PT 5MM 1.6X15
Type: IMPLANTABLE DEVICE | Site: ANKLE | Status: NON-FUNCTIONAL
Removed: 2018-09-11

## (undated) DEVICE — BIT DRILL 2.0MM D DEPTH 110MM

## (undated) DEVICE — APPLICATOR CHLORAPREP ORN 26ML

## (undated) DEVICE — SEE MEDLINE ITEM 152515

## (undated) DEVICE — TAPE SURG DURAPORE 2 X10YD

## (undated) DEVICE — TOURNIQUET SB QC DP 34X4IN

## (undated) DEVICE — BANDAGE ESMARK 6X12

## (undated) DEVICE — SEE MEDLINE ITEM 152529

## (undated) DEVICE — ELECTRODE REM PLYHSV RETURN 9

## (undated) DEVICE — SUT ETHILON 3/0 18IN PS-1